# Patient Record
Sex: MALE | Race: WHITE | Employment: FULL TIME | URBAN - METROPOLITAN AREA
[De-identification: names, ages, dates, MRNs, and addresses within clinical notes are randomized per-mention and may not be internally consistent; named-entity substitution may affect disease eponyms.]

---

## 2017-02-28 ENCOUNTER — APPOINTMENT (OUTPATIENT)
Dept: GENERAL RADIOLOGY | Age: 47
End: 2017-02-28
Attending: EMERGENCY MEDICINE
Payer: COMMERCIAL

## 2017-02-28 ENCOUNTER — HOSPITAL ENCOUNTER (EMERGENCY)
Age: 47
Discharge: HOME | End: 2017-02-28
Attending: EMERGENCY MEDICINE
Payer: COMMERCIAL

## 2017-02-28 VITALS
HEART RATE: 86 BPM | HEIGHT: 78 IN | RESPIRATION RATE: 16 BRPM | BODY MASS INDEX: 36.45 KG/M2 | DIASTOLIC BLOOD PRESSURE: 74 MMHG | TEMPERATURE: 98.2 F | SYSTOLIC BLOOD PRESSURE: 128 MMHG | OXYGEN SATURATION: 98 % | WEIGHT: 315 LBS

## 2017-02-28 DIAGNOSIS — R91.8 LEFT PULMONARY INFILTRATE ON CXR: ICD-10-CM

## 2017-02-28 DIAGNOSIS — R07.89 ATYPICAL CHEST PAIN: Primary | ICD-10-CM

## 2017-02-28 LAB
ALBUMIN SERPL BCP-MCNC: 3.7 G/DL (ref 3.5–5)
ALBUMIN/GLOB SERPL: 1 {RATIO} (ref 1.2–3.5)
ALP SERPL-CCNC: 86 U/L (ref 50–136)
ALT SERPL-CCNC: 40 U/L (ref 12–65)
ANION GAP BLD CALC-SCNC: 7 MMOL/L (ref 7–16)
AST SERPL W P-5'-P-CCNC: 27 U/L (ref 15–37)
ATRIAL RATE: 187 BPM
BASOPHILS # BLD AUTO: 0 K/UL (ref 0–0.2)
BASOPHILS # BLD: 0 % (ref 0–2)
BILIRUB SERPL-MCNC: 0.2 MG/DL (ref 0.2–1.1)
BUN SERPL-MCNC: 13 MG/DL (ref 6–23)
CALCIUM SERPL-MCNC: 8.7 MG/DL (ref 8.3–10.4)
CALCULATED P AXIS, ECG09: NORMAL DEGREES
CALCULATED R AXIS, ECG10: 26 DEGREES
CALCULATED T AXIS, ECG11: 53 DEGREES
CHLORIDE SERPL-SCNC: 106 MMOL/L (ref 98–107)
CO2 SERPL-SCNC: 28 MMOL/L (ref 21–32)
CREAT SERPL-MCNC: 0.93 MG/DL (ref 0.8–1.5)
DIAGNOSIS, 93000: NORMAL
DIASTOLIC BP, ECG02: NORMAL MMHG
DIFFERENTIAL METHOD BLD: ABNORMAL
EOSINOPHIL # BLD: 0.2 K/UL (ref 0–0.8)
EOSINOPHIL NFR BLD: 1 % (ref 0.5–7.8)
ERYTHROCYTE [DISTWIDTH] IN BLOOD BY AUTOMATED COUNT: 12.7 % (ref 11.9–14.6)
GLOBULIN SER CALC-MCNC: 3.6 G/DL (ref 2.3–3.5)
GLUCOSE SERPL-MCNC: 156 MG/DL (ref 65–100)
HCT VFR BLD AUTO: 44.5 % (ref 41.1–50.3)
HGB BLD-MCNC: 15.1 G/DL (ref 13.6–17.2)
IMM GRANULOCYTES # BLD: 0.1 K/UL (ref 0–0.5)
IMM GRANULOCYTES NFR BLD AUTO: 0.4 % (ref 0–5)
LYMPHOCYTES # BLD AUTO: 25 % (ref 13–44)
LYMPHOCYTES # BLD: 3.5 K/UL (ref 0.5–4.6)
MCH RBC QN AUTO: 29.6 PG (ref 26.1–32.9)
MCHC RBC AUTO-ENTMCNC: 33.9 G/DL (ref 31.4–35)
MCV RBC AUTO: 87.3 FL (ref 79.6–97.8)
MONOCYTES # BLD: 0.8 K/UL (ref 0.1–1.3)
MONOCYTES NFR BLD AUTO: 6 % (ref 4–12)
NEUTS SEG # BLD: 9.2 K/UL (ref 1.7–8.2)
NEUTS SEG NFR BLD AUTO: 68 % (ref 43–78)
P-R INTERVAL, ECG05: NORMAL MS
PLATELET # BLD AUTO: 271 K/UL (ref 150–450)
PMV BLD AUTO: 11.3 FL (ref 10.8–14.1)
POTASSIUM SERPL-SCNC: 4.2 MMOL/L (ref 3.5–5.1)
PROT SERPL-MCNC: 7.3 G/DL (ref 6.3–8.2)
Q-T INTERVAL, ECG07: 340 MS
QRS DURATION, ECG06: 96 MS
QTC CALCULATION (BEZET), ECG08: 418 MS
RBC # BLD AUTO: 5.1 M/UL (ref 4.23–5.67)
SODIUM SERPL-SCNC: 141 MMOL/L (ref 136–145)
SYSTOLIC BP, ECG01: NORMAL MMHG
TROPONIN I BLD-MCNC: 0 NG/ML (ref 0–0.08)
VENTRICULAR RATE, ECG03: 91 BPM
WBC # BLD AUTO: 13.8 K/UL (ref 4.3–11.1)

## 2017-02-28 PROCEDURE — 99284 EMERGENCY DEPT VISIT MOD MDM: CPT | Performed by: EMERGENCY MEDICINE

## 2017-02-28 PROCEDURE — 80053 COMPREHEN METABOLIC PANEL: CPT | Performed by: EMERGENCY MEDICINE

## 2017-02-28 PROCEDURE — 96374 THER/PROPH/DIAG INJ IV PUSH: CPT | Performed by: EMERGENCY MEDICINE

## 2017-02-28 PROCEDURE — 71010 XR CHEST PORT: CPT

## 2017-02-28 PROCEDURE — 85025 COMPLETE CBC W/AUTO DIFF WBC: CPT | Performed by: EMERGENCY MEDICINE

## 2017-02-28 PROCEDURE — 74011250637 HC RX REV CODE- 250/637: Performed by: EMERGENCY MEDICINE

## 2017-02-28 PROCEDURE — 93005 ELECTROCARDIOGRAM TRACING: CPT | Performed by: EMERGENCY MEDICINE

## 2017-02-28 PROCEDURE — 74011250636 HC RX REV CODE- 250/636: Performed by: EMERGENCY MEDICINE

## 2017-02-28 PROCEDURE — 84484 ASSAY OF TROPONIN QUANT: CPT

## 2017-02-28 RX ORDER — SUCRALFATE 1 G/10ML
1 SUSPENSION ORAL
Status: COMPLETED | OUTPATIENT
Start: 2017-02-28 | End: 2017-02-28

## 2017-02-28 RX ORDER — KETOROLAC TROMETHAMINE 30 MG/ML
30 INJECTION, SOLUTION INTRAMUSCULAR; INTRAVENOUS
Status: COMPLETED | OUTPATIENT
Start: 2017-02-28 | End: 2017-02-28

## 2017-02-28 RX ORDER — DOXYCYCLINE 100 MG/1
100 CAPSULE ORAL 2 TIMES DAILY
Qty: 14 CAP | Refills: 0 | Status: SHIPPED | OUTPATIENT
Start: 2017-02-28 | End: 2017-03-07

## 2017-02-28 RX ORDER — NAPROXEN 500 MG/1
500 TABLET ORAL 2 TIMES DAILY WITH MEALS
Qty: 20 TAB | Refills: 0 | Status: SHIPPED | OUTPATIENT
Start: 2017-02-28 | End: 2017-03-10

## 2017-02-28 RX ADMIN — SUCRALFATE 1 G: 1 SUSPENSION ORAL at 10:09

## 2017-02-28 RX ADMIN — KETOROLAC TROMETHAMINE 30 MG: 30 INJECTION, SOLUTION INTRAMUSCULAR at 12:35

## 2017-02-28 NOTE — ED PROVIDER NOTES
HPI Comments: Patient comes in the department with chest pain goes into his left shoulder and left latissimus region. He does have some radiation down to the top of his left hand as well. The smoker with a family history of father requiring stents. He has no other known family history. Yesterday he states that he had \"bad heartburn\" he stated it was difficult to get this to resolve. Did not have any associated shortness of breath today or yesterday. He had some slight nausea this morning. Does not relate an awareness of exertional worsening. Given aspirin and nitroglycerin in transit states of the nitroglycerin gave him a headache but did not seem to impact his chest discomfort. Not changed with physical exertion and in ER palpation to same exact area reproduces his pain. No injury /redness or rash. No fever/cough or sputum. Patient is a 55 y.o. male presenting with chest pain. The history is provided by the patient. Chest Pain (Angina)    This is a new problem. The current episode started 12 to 24 hours ago. The problem has not changed since onset. Pertinent negatives include no cough, no fever, no palpitations and no shortness of breath. Past Medical History:   Diagnosis Date    Arthritis     Cholesterol serum elevated     Diabetes ([facility] [address] 1/17/2012    pt denies dx of diabetes; blood glucose=210, urine with glucose >1000    History of kidney stones 2009    Hypertension        Past Surgical History:   Procedure Laterality Date    HX APPENDECTOMY  2007    HX KNEE ARTHROSCOPY      right, x 11    HX KNEE REPLACEMENT  2009    right    HX VASECTOMY           History reviewed. No pertinent family history. Social History     Social History    Marital status:      Spouse name: N/A    Number of children: N/A    Years of education: N/A     Occupational History    Not on file.      Social History Main Topics    Smoking status: Current Every Day Smoker     Packs/day: 1.00     Years: 20.00    Smokeless tobacco: Never Used    Alcohol use No    Drug use: No    Sexual activity: Not on file     Other Topics Concern    Not on file     Social History Narrative         ALLERGIES: Review of patient's allergies indicates no known allergies. Review of Systems   Constitutional: Negative for chills, fatigue and fever. Respiratory: Negative for cough and shortness of breath. Cardiovascular: Positive for chest pain. Negative for palpitations and leg swelling. Gastrointestinal: Negative. Musculoskeletal: Negative. All other systems reviewed and are negative. Vitals:    02/28/17 0939   BP: 164/89   Pulse: 98   Resp: 18   Temp: 98.2 °F (36.8 °C)   SpO2: 98%   Weight: (!) 169.6 kg (374 lb)   Height: 6' 8\" (2.032 m)            Physical Exam   Constitutional: He appears well-developed and well-nourished. No distress. HENT:   Head: Atraumatic. Eyes: No scleral icterus. Neck: Neck supple. No JVD present. Cardiovascular: Normal rate, regular rhythm, normal heart sounds and intact distal pulses. No murmur heard. Pulmonary/Chest: Effort normal. No respiratory distress. He exhibits tenderness. He exhibits no bony tenderness, no crepitus, no edema, no deformity and no swelling. Abdominal: Soft. He exhibits no distension. There is no rebound and no guarding. Musculoskeletal: Normal range of motion. He exhibits no edema or tenderness. Neurological: He is alert. Skin: Skin is warm and dry. No rash noted. No erythema. Psychiatric: His behavior is normal. Thought content normal.   Nursing note and vitals reviewed. MDM  Number of Diagnoses or Management Options  Atypical chest pain:   Left pulmonary infiltrate on CXR:   Diagnosis management comments: Very reproducible pain that he is quite specific reproduces his presenting pain to palpation of region. No infectious changes. Will check cardiac. No change with exertion and nitroglycerin did not impact.  Does have risk factors and plans on following up with PMD in AM. Discussed at length need to return with any worsening and while present studies show no evidence of infarct they do NOT rule out coronary disease - patient and wife acknowledge understanding       Amount and/or Complexity of Data Reviewed  Clinical lab tests: ordered and reviewed  Tests in the radiology section of CPT®: reviewed and ordered  Decide to obtain previous medical records or to obtain history from someone other than the patient: yes  Obtain history from someone other than the patient: yes  Independent visualization of images, tracings, or specimens: yes    Risk of Complications, Morbidity, and/or Mortality  Presenting problems: high  Diagnostic procedures: low  Management options: moderate  General comments: Aware to return with ANY worsening    Patient Progress  Patient progress: stable    ED Course       Procedures     PORTABLE CHEST, February 28, 2017 at 1020 hours     CLINICAL HISTORY: CHEST pain with leukocytosis.     COMPARISON: None.     FINDINGS: AP erect image demonstrates no confluent infiltrate or significant  pleural fluid. There is patchy left perihilar infiltrate. The heart size is  within normal limits without evidence of congestive heart failure or  pneumothorax. The bony thorax appears intact on this view.  There are overlying  radiopaque support devices.     IMPRESSION  IMPRESSION: PATCHY LEFT PERIHILAR INFILTRATE IS NOT SPECIFIC BUT SUSPICIOUS FOR  PNEUMONIA IN THIS CLINICAL SETTING.       Imaging      Recent Results (from the past 12 hour(s))   CBC WITH AUTOMATED DIFF    Collection Time: 02/28/17  9:40 AM   Result Value Ref Range    WBC 13.8 (H) 4.3 - 11.1 K/uL    RBC 5.10 4.23 - 5.67 M/uL    HGB 15.1 13.6 - 17.2 g/dL    HCT 44.5 41.1 - 50.3 %    MCV 87.3 79.6 - 97.8 FL    MCH 29.6 26.1 - 32.9 PG    MCHC 33.9 31.4 - 35.0 g/dL    RDW 12.7 11.9 - 14.6 %    PLATELET 632 271 - 199 K/uL    MPV 11.3 10.8 - 14.1 FL    DF AUTOMATED NEUTROPHILS 68 43 - 78 %    LYMPHOCYTES 25 13 - 44 %    MONOCYTES 6 4.0 - 12.0 %    EOSINOPHILS 1 0.5 - 7.8 %    BASOPHILS 0 0.0 - 2.0 %    IMMATURE GRANULOCYTES 0.4 0.0 - 5.0 %    ABS. NEUTROPHILS 9.2 (H) 1.7 - 8.2 K/UL    ABS. LYMPHOCYTES 3.5 0.5 - 4.6 K/UL    ABS. MONOCYTES 0.8 0.1 - 1.3 K/UL    ABS. EOSINOPHILS 0.2 0.0 - 0.8 K/UL    ABS. BASOPHILS 0.0 0.0 - 0.2 K/UL    ABS. IMM. GRANS. 0.1 0.0 - 0.5 K/UL   METABOLIC PANEL, COMPREHENSIVE    Collection Time: 02/28/17  9:40 AM   Result Value Ref Range    Sodium 141 136 - 145 mmol/L    Potassium 4.2 3.5 - 5.1 mmol/L    Chloride 106 98 - 107 mmol/L    CO2 28 21 - 32 mmol/L    Anion gap 7 7 - 16 mmol/L    Glucose 156 (H) 65 - 100 mg/dL    BUN 13 6 - 23 MG/DL    Creatinine 0.93 0.8 - 1.5 MG/DL    GFR est AA >60 >60 ml/min/1.73m2    GFR est non-AA >60 >60 ml/min/1.73m2    Calcium 8.7 8.3 - 10.4 MG/DL    Bilirubin, total 0.2 0.2 - 1.1 MG/DL    ALT (SGPT) 40 12 - 65 U/L    AST (SGOT) 27 15 - 37 U/L    Alk. phosphatase 86 50 - 136 U/L    Protein, total 7.3 6.3 - 8.2 g/dL    Albumin 3.7 3.5 - 5.0 g/dL    Globulin 3.6 (H) 2.3 - 3.5 g/dL    A-G Ratio 1.0 (L) 1.2 - 3.5     EKG, 12 LEAD, INITIAL    Collection Time: 02/28/17  9:40 AM   Result Value Ref Range    Systolic BP  mmHg    Diastolic BP  mmHg    Ventricular Rate 91 BPM    Atrial Rate 187 BPM    P-R Interval  ms    QRS Duration 96 ms    Q-T Interval 340 ms    QTC Calculation (Bezet) 418 ms    Calculated P Axis  degrees    Calculated R Axis 26 degrees    Calculated T Axis 53 degrees    Diagnosis       !! AGE AND GENDER SPECIFIC ECG ANALYSIS !!   Normal sinus rhythm  Abnormal ECG  When compared with ECG of 09-FEB-2012 09:16,  Nonspecific T wave abnormality, improved in Lateral leads  Confirmed by PATRICE MACIAS (), ISAAC CLAY ([id number]) on 2/28/2017 12:30:26 PM     POC TROPONIN-I    Collection Time: 02/28/17 12:44 PM   Result Value Ref Range    Troponin-I (POC) 0 0.0 - 0.08 ng/ml

## 2017-02-28 NOTE — LETTER
[facility] [place] [address] [id number] [phone] Work/School Note Date: 2/28/2017 To Whom It May concern: 
 
Carmen Yeung was seen and treated today in the emergency room by the following provider(s): 
Attending Provider: Soy Thompson MD.   
 
Carmen Yeung may return to work on 3/2/17. Sincerely, Steve Greco

## 2017-02-28 NOTE — ED TRIAGE NOTES
Pt arrives per EMS with complaints of CP beginning this AM. Had \"real bad heart burn\" when he went to bed last night. Given 324mg ASA and 2 Nitro per EMS with no relief of pain.

## 2017-02-28 NOTE — LETTER
[facility] EMERGENCY DEPT One [address] [address] [phone] Work/School Note Date: 2/28/2017 To Whom It May concern: 
 
Mark Vaca was seen and treated today in the emergency room by the following provider(s): 
Attending Provider: Amie Young MD.   
 
Mark Vaca may return to work on 3/2. Sincerely, 
 
 
 
 
Amie Young MD

## 2017-02-28 NOTE — DISCHARGE INSTRUCTIONS
Chest Pain: Care Instructions  Your Care Instructions  There are many things that can cause chest pain. Some are not serious and will get better on their own in a few days. But some kinds of chest pain need more testing and treatment. Your doctor may have recommended a follow-up visit in the next 8 to 12 hours. If you are not getting better, you may need more tests or treatment. Even though your doctor has released you, you still need to watch for any problems. The doctor carefully checked you, but sometimes problems can develop later. If you have new symptoms or if your symptoms do not get better, get medical care right away. If you have worse or different chest pain or pressure that lasts more than 5 minutes or you passed out (lost consciousness), call 911 or seek other emergency help right away. A medical visit is only one step in your treatment. Even if you feel better, you still need to do what your doctor recommends, such as going to all suggested follow-up appointments and taking medicines exactly as directed. This will help you recover and help prevent future problems. How can you care for yourself at home? · Rest until you feel better. · Take your medicine exactly as prescribed. Call your doctor if you think you are having a problem with your medicine. · Do not drive after taking a prescription pain medicine. When should you call for help? Call 911 if:  · You passed out (lost consciousness). · You have severe difficulty breathing. · You have symptoms of a heart attack. These may include:  ¨ Chest pain or pressure, or a strange feeling in your chest.  ¨ Sweating. ¨ Shortness of breath. ¨ Nausea or vomiting. ¨ Pain, pressure, or a strange feeling in your back, neck, jaw, or upper belly or in one or both shoulders or arms. ¨ Lightheadedness or sudden weakness. ¨ A fast or irregular heartbeat.   After you call 911, the  may tell you to chew 1 adult-strength or 2 to 4 low-dose aspirin. Wait for an ambulance. Do not try to drive yourself. Call your doctor today if:  · You have any trouble breathing. · Your chest pain gets worse. · You are dizzy or lightheaded, or you feel like you may faint. · You are not getting better as expected. · You are having new or different chest pain. Where can you learn more? Go to http://alfredo-evan.info/. Enter A120 in the search box to learn more about \"Chest Pain: Care Instructions. \"  Current as of: May 27, 2016  Content Version: 11.1  © 8372-7255 [employer]. Care instructions adapted under license by [employer] (which disclaims liability or warranty for this information). If you have questions about a medical condition or this instruction, always ask your healthcare professional. [address] any warranty or liability for your use of this information. Musculoskeletal Chest Pain: Care Instructions  Your Care Instructions  Chest pain is not always a sign that something is wrong with your heart or that you have another serious problem. The doctor thinks your chest pain is caused by strained muscles or ligaments, inflamed chest cartilage, or another problem in your chest, rather than by your heart. You may need more tests to find the cause of your chest pain. Follow-up care is a key part of your treatment and safety. Be sure to make and go to all appointments, and call your doctor if you are having problems. Its also a good idea to know your test results and keep a list of the medicines you take. How can you care for yourself at home? · Take pain medicines exactly as directed. ¨ If the doctor gave you a prescription medicine for pain, take it as prescribed. ¨ If you are not taking a prescription pain medicine, ask your doctor if you can take an over-the-counter medicine. · Rest and protect the sore area.   · Stop, change, or take a break from any activity that may be causing your pain or soreness. · Put ice or a cold pack on the sore area for 10 to 20 minutes at a time. Try to do this every 1 to 2 hours for the next 3 days (when you are awake) or until the swelling goes down. Put a thin cloth between the ice and your skin. · After 2 or 3 days, apply a heating pad set on low or a warm cloth to the area that hurts. Some doctors suggest that you go back and forth between hot and cold. · Do not wrap or tape your ribs for support. This may cause you to take smaller breaths, which could increase your risk of lung problems. · Mentholated creams such as Bengay or Icy Hot may soothe sore muscles. Follow the instructions on the package. · Follow your doctor's instructions for exercising. · Gentle stretching and massage may help you get better faster. Stretch slowly to the point just before pain begins, and hold the stretch for at least 15 to 30 seconds. Do this 3 or 4 times a day. Stretch just after you have applied heat. · As your pain gets better, slowly return to your normal activities. Any increased pain may be a sign that you need to rest a while longer. When should you call for help? Call 911 anytime you think you may need emergency care. For example, call if:  · You have chest pain or pressure. This may occur with:  ¨ Sweating. ¨ Shortness of breath. ¨ Nausea or vomiting. ¨ Pain that spreads from the chest to the neck, jaw, or one or both shoulders or arms. ¨ Dizziness or lightheadedness. ¨ A fast or uneven pulse. After calling 911, chew 1 adult-strength aspirin. Wait for an ambulance. Do not try to drive yourself. · You have sudden chest pain and shortness of breath, or you cough up blood. Call your doctor now or seek immediate medical care if:  · You have any trouble breathing. · Your chest pain gets worse.   · Your chest pain occurs consistently with exercise and is relieved by rest.  Watch closely for changes in your health, and be sure to contact your doctor if:  · Your chest pain does not get better after 1 week. Where can you learn more? Go to http://alfredo-evan.info/. Enter V293 in the search box to learn more about \"Musculoskeletal Chest Pain: Care Instructions. \"  Current as of: May 27, 2016  Content Version: 11.1  © 5481-9418 [employer]. Care instructions adapted under license by [employer] (which disclaims liability or warranty for this information). If you have questions about a medical condition or this instruction, always ask your healthcare professional. [address] any warranty or liability for your use of this information.

## 2017-03-01 LAB — TROPONIN I BLD-MCNC: 0 NG/ML (ref 0–0.08)

## 2024-04-25 RX ORDER — ASPIRIN 81 MG/1
81 TABLET, CHEWABLE ORAL
COMMUNITY

## 2024-04-25 RX ORDER — NAPROXEN 500 MG/1
500 TABLET ORAL 2 TIMES DAILY WITH MEALS
COMMUNITY

## 2024-04-25 RX ORDER — TIRZEPATIDE 2.5 MG/.5ML
2.5 INJECTION, SOLUTION SUBCUTANEOUS WEEKLY
COMMUNITY

## 2024-04-25 RX ORDER — ROSUVASTATIN CALCIUM 40 MG/1
40 TABLET, COATED ORAL EVERY EVENING
COMMUNITY

## 2024-04-25 RX ORDER — OMEPRAZOLE 20 MG/1
40 CAPSULE, DELAYED RELEASE ORAL
COMMUNITY

## 2024-04-25 RX ORDER — CETIRIZINE HYDROCHLORIDE 10 MG/1
10 TABLET ORAL NIGHTLY
COMMUNITY

## 2024-04-25 RX ORDER — PREGABALIN 300 MG/1
300 CAPSULE ORAL 2 TIMES DAILY
COMMUNITY

## 2024-04-25 RX ORDER — LISINOPRIL 40 MG/1
40 TABLET ORAL
COMMUNITY

## 2024-04-25 RX ORDER — GLIPIZIDE 10 MG/1
10 TABLET ORAL
COMMUNITY

## 2024-04-25 RX ORDER — AMLODIPINE BESYLATE 10 MG/1
10 TABLET ORAL
COMMUNITY

## 2024-04-25 RX ORDER — MONTELUKAST SODIUM 10 MG/1
10 TABLET ORAL NIGHTLY
COMMUNITY

## 2024-04-25 RX ORDER — METOPROLOL SUCCINATE 50 MG/1
50 TABLET, EXTENDED RELEASE ORAL
COMMUNITY

## 2024-04-29 ENCOUNTER — ANESTHESIA EVENT (OUTPATIENT)
Dept: SURGERY | Age: 54
End: 2024-04-29
Payer: COMMERCIAL

## 2024-04-29 NOTE — PERIOP NOTE
Preop department called to notify patient of arrival time for scheduled procedure. Instructions given to   - Arrive at [facility] Entrance [address].  - Remain NPO after midnight, unless otherwise indicated, including gum, mints, and ice chips.   - Have a responsible adult to drive patient to the hospital, stay during surgery, and patient will need supervision 24 hours after anesthesia.   - Use antibacterial soap in shower the night before surgery and on the morning of surgery.       Was patient contacted: yes, spoke to patient  Voicemail left:   Numbers contacted: [phone]   Arrival time: 0530      
your surgery is at the [facility].  > Do not wear make-up, nail polish, lotions, cologne, perfumes, powders, or oil on skin. Artificial nails are not permitted.

## 2024-04-30 ENCOUNTER — HOSPITAL ENCOUNTER (OUTPATIENT)
Age: 54
Setting detail: OUTPATIENT SURGERY
Discharge: HOME | End: 2024-04-30
Attending: PODIATRIST | Admitting: PODIATRIST
Payer: COMMERCIAL

## 2024-04-30 ENCOUNTER — APPOINTMENT (OUTPATIENT)
Dept: GENERAL RADIOLOGY | Age: 54
End: 2024-04-30
Attending: PODIATRIST
Payer: COMMERCIAL

## 2024-04-30 ENCOUNTER — ANESTHESIA (OUTPATIENT)
Dept: SURGERY | Age: 54
End: 2024-04-30
Payer: COMMERCIAL

## 2024-04-30 VITALS
HEART RATE: 62 BPM | WEIGHT: 315 LBS | HEIGHT: 78 IN | RESPIRATION RATE: 20 BRPM | SYSTOLIC BLOOD PRESSURE: 143 MMHG | BODY MASS INDEX: 36.45 KG/M2 | TEMPERATURE: 97.5 F | OXYGEN SATURATION: 94 % | DIASTOLIC BLOOD PRESSURE: 75 MMHG

## 2024-04-30 LAB
GLUCOSE BLD STRIP.AUTO-MCNC: 181 MG/DL (ref 65–100)
SERVICE CMNT-IMP: ABNORMAL

## 2024-04-30 PROCEDURE — 6360000002 HC RX W HCPCS: Performed by: NURSE ANESTHETIST, CERTIFIED REGISTERED

## 2024-04-30 PROCEDURE — 7100000010 HC PHASE II RECOVERY - FIRST 15 MIN: Performed by: PODIATRIST

## 2024-04-30 PROCEDURE — 3700000000 HC ANESTHESIA ATTENDED CARE: Performed by: PODIATRIST

## 2024-04-30 PROCEDURE — 2709999900 HC NON-CHARGEABLE SUPPLY: Performed by: PODIATRIST

## 2024-04-30 PROCEDURE — 2500000003 HC RX 250 WO HCPCS: Performed by: PODIATRIST

## 2024-04-30 PROCEDURE — 3700000001 HC ADD 15 MINUTES (ANESTHESIA): Performed by: PODIATRIST

## 2024-04-30 PROCEDURE — 6360000002 HC RX W HCPCS: Performed by: PODIATRIST

## 2024-04-30 PROCEDURE — 2580000003 HC RX 258: Performed by: ANESTHESIOLOGY

## 2024-04-30 PROCEDURE — 7100000001 HC PACU RECOVERY - ADDTL 15 MIN: Performed by: PODIATRIST

## 2024-04-30 PROCEDURE — 3600000004 HC SURGERY LEVEL 4 BASE: Performed by: PODIATRIST

## 2024-04-30 PROCEDURE — 82962 GLUCOSE BLOOD TEST: CPT

## 2024-04-30 PROCEDURE — 7100000000 HC PACU RECOVERY - FIRST 15 MIN: Performed by: PODIATRIST

## 2024-04-30 PROCEDURE — 88311 DECALCIFY TISSUE: CPT

## 2024-04-30 PROCEDURE — 3600000014 HC SURGERY LEVEL 4 ADDTL 15MIN: Performed by: PODIATRIST

## 2024-04-30 PROCEDURE — 87176 TISSUE HOMOGENIZATION CULTR: CPT

## 2024-04-30 PROCEDURE — 2500000003 HC RX 250 WO HCPCS: Performed by: NURSE ANESTHETIST, CERTIFIED REGISTERED

## 2024-04-30 PROCEDURE — 87070 CULTURE OTHR SPECIMN AEROBIC: CPT

## 2024-04-30 PROCEDURE — 87075 CULTR BACTERIA EXCEPT BLOOD: CPT

## 2024-04-30 PROCEDURE — 6370000000 HC RX 637 (ALT 250 FOR IP): Performed by: ANESTHESIOLOGY

## 2024-04-30 PROCEDURE — 87205 SMEAR GRAM STAIN: CPT

## 2024-04-30 PROCEDURE — 88304 TISSUE EXAM BY PATHOLOGIST: CPT

## 2024-04-30 RX ORDER — SODIUM CHLORIDE 0.9 % (FLUSH) 0.9 %
5-40 SYRINGE (ML) INJECTION PRN
Status: DISCONTINUED | OUTPATIENT
Start: 2024-04-30 | End: 2024-04-30 | Stop reason: HOSPADM

## 2024-04-30 RX ORDER — MIDAZOLAM HYDROCHLORIDE 2 MG/2ML
2 INJECTION, SOLUTION INTRAMUSCULAR; INTRAVENOUS
Status: DISCONTINUED | OUTPATIENT
Start: 2024-04-30 | End: 2024-04-30 | Stop reason: HOSPADM

## 2024-04-30 RX ORDER — HYDROMORPHONE HYDROCHLORIDE 2 MG/ML
0.5 INJECTION, SOLUTION INTRAMUSCULAR; INTRAVENOUS; SUBCUTANEOUS EVERY 5 MIN PRN
Status: DISCONTINUED | OUTPATIENT
Start: 2024-04-30 | End: 2024-04-30 | Stop reason: HOSPADM

## 2024-04-30 RX ORDER — SODIUM CHLORIDE 9 MG/ML
INJECTION, SOLUTION INTRAVENOUS PRN
Status: DISCONTINUED | OUTPATIENT
Start: 2024-04-30 | End: 2024-04-30 | Stop reason: HOSPADM

## 2024-04-30 RX ORDER — SODIUM CHLORIDE, SODIUM LACTATE, POTASSIUM CHLORIDE, CALCIUM CHLORIDE 600; 310; 30; 20 MG/100ML; MG/100ML; MG/100ML; MG/100ML
INJECTION, SOLUTION INTRAVENOUS CONTINUOUS
Status: DISCONTINUED | OUTPATIENT
Start: 2024-04-30 | End: 2024-04-30 | Stop reason: HOSPADM

## 2024-04-30 RX ORDER — SODIUM CHLORIDE 0.9 % (FLUSH) 0.9 %
5-40 SYRINGE (ML) INJECTION EVERY 12 HOURS SCHEDULED
Status: DISCONTINUED | OUTPATIENT
Start: 2024-04-30 | End: 2024-04-30 | Stop reason: HOSPADM

## 2024-04-30 RX ORDER — PROCHLORPERAZINE EDISYLATE 5 MG/ML
5 INJECTION INTRAMUSCULAR; INTRAVENOUS
Status: DISCONTINUED | OUTPATIENT
Start: 2024-04-30 | End: 2024-04-30 | Stop reason: HOSPADM

## 2024-04-30 RX ORDER — BUPIVACAINE HYDROCHLORIDE 5 MG/ML
INJECTION, SOLUTION EPIDURAL; INTRACAUDAL PRN
Status: DISCONTINUED | OUTPATIENT
Start: 2024-04-30 | End: 2024-04-30 | Stop reason: ALTCHOICE

## 2024-04-30 RX ORDER — LIDOCAINE HYDROCHLORIDE 20 MG/ML
INJECTION, SOLUTION EPIDURAL; INFILTRATION; INTRACAUDAL; PERINEURAL PRN
Status: DISCONTINUED | OUTPATIENT
Start: 2024-04-30 | End: 2024-04-30 | Stop reason: SDUPTHER

## 2024-04-30 RX ORDER — LIDOCAINE HYDROCHLORIDE 10 MG/ML
INJECTION, SOLUTION INFILTRATION; PERINEURAL PRN
Status: DISCONTINUED | OUTPATIENT
Start: 2024-04-30 | End: 2024-04-30 | Stop reason: ALTCHOICE

## 2024-04-30 RX ORDER — MIDAZOLAM HYDROCHLORIDE 1 MG/ML
INJECTION INTRAMUSCULAR; INTRAVENOUS PRN
Status: DISCONTINUED | OUTPATIENT
Start: 2024-04-30 | End: 2024-04-30 | Stop reason: SDUPTHER

## 2024-04-30 RX ORDER — LIDOCAINE HYDROCHLORIDE 10 MG/ML
1 INJECTION, SOLUTION INFILTRATION; PERINEURAL
Status: DISCONTINUED | OUTPATIENT
Start: 2024-04-30 | End: 2024-04-30 | Stop reason: HOSPADM

## 2024-04-30 RX ORDER — DIPHENHYDRAMINE HYDROCHLORIDE 50 MG/ML
12.5 INJECTION INTRAMUSCULAR; INTRAVENOUS
Status: DISCONTINUED | OUTPATIENT
Start: 2024-04-30 | End: 2024-04-30 | Stop reason: HOSPADM

## 2024-04-30 RX ORDER — FENTANYL CITRATE 50 UG/ML
INJECTION, SOLUTION INTRAMUSCULAR; INTRAVENOUS PRN
Status: DISCONTINUED | OUTPATIENT
Start: 2024-04-30 | End: 2024-04-30 | Stop reason: SDUPTHER

## 2024-04-30 RX ORDER — ONDANSETRON 2 MG/ML
4 INJECTION INTRAMUSCULAR; INTRAVENOUS
Status: DISCONTINUED | OUTPATIENT
Start: 2024-04-30 | End: 2024-04-30 | Stop reason: HOSPADM

## 2024-04-30 RX ORDER — OXYCODONE HYDROCHLORIDE 5 MG/1
5 TABLET ORAL
Status: DISCONTINUED | OUTPATIENT
Start: 2024-04-30 | End: 2024-04-30 | Stop reason: HOSPADM

## 2024-04-30 RX ORDER — ACETAMINOPHEN 500 MG
1000 TABLET ORAL ONCE
Status: COMPLETED | OUTPATIENT
Start: 2024-04-30 | End: 2024-04-30

## 2024-04-30 RX ORDER — DEXTROSE MONOHYDRATE 100 MG/ML
INJECTION, SOLUTION INTRAVENOUS CONTINUOUS PRN
Status: DISCONTINUED | OUTPATIENT
Start: 2024-04-30 | End: 2024-04-30 | Stop reason: HOSPADM

## 2024-04-30 RX ORDER — CEFAZOLIN SODIUM 1 G/3ML
INJECTION, POWDER, FOR SOLUTION INTRAMUSCULAR; INTRAVENOUS PRN
Status: DISCONTINUED | OUTPATIENT
Start: 2024-04-30 | End: 2024-04-30 | Stop reason: ALTCHOICE

## 2024-04-30 RX ORDER — PROPOFOL 10 MG/ML
INJECTION, EMULSION INTRAVENOUS PRN
Status: DISCONTINUED | OUTPATIENT
Start: 2024-04-30 | End: 2024-04-30 | Stop reason: SDUPTHER

## 2024-04-30 RX ORDER — IBUPROFEN 600 MG/1
1 TABLET ORAL PRN
Status: DISCONTINUED | OUTPATIENT
Start: 2024-04-30 | End: 2024-04-30 | Stop reason: HOSPADM

## 2024-04-30 RX ORDER — NALOXONE HYDROCHLORIDE 0.4 MG/ML
INJECTION, SOLUTION INTRAMUSCULAR; INTRAVENOUS; SUBCUTANEOUS PRN
Status: DISCONTINUED | OUTPATIENT
Start: 2024-04-30 | End: 2024-04-30 | Stop reason: HOSPADM

## 2024-04-30 RX ADMIN — PROPOFOL 50 MG: 10 INJECTION, EMULSION INTRAVENOUS at 07:27

## 2024-04-30 RX ADMIN — SODIUM CHLORIDE, POTASSIUM CHLORIDE, SODIUM LACTATE AND CALCIUM CHLORIDE: 600; 310; 30; 20 INJECTION, SOLUTION INTRAVENOUS at 06:54

## 2024-04-30 RX ADMIN — FENTANYL CITRATE 50 MCG: 50 INJECTION, SOLUTION INTRAMUSCULAR; INTRAVENOUS at 08:18

## 2024-04-30 RX ADMIN — Medication 3000 MG: at 07:28

## 2024-04-30 RX ADMIN — ACETAMINOPHEN 1000 MG: 500 TABLET, FILM COATED ORAL at 06:53

## 2024-04-30 RX ADMIN — LIDOCAINE HYDROCHLORIDE 40 MG: 20 INJECTION, SOLUTION EPIDURAL; INFILTRATION; INTRACAUDAL; PERINEURAL at 07:27

## 2024-04-30 RX ADMIN — PROPOFOL 140 MCG/KG/MIN: 10 INJECTION, EMULSION INTRAVENOUS at 07:28

## 2024-04-30 RX ADMIN — FENTANYL CITRATE 50 MCG: 50 INJECTION, SOLUTION INTRAMUSCULAR; INTRAVENOUS at 07:25

## 2024-04-30 RX ADMIN — MIDAZOLAM 2 MG: 1 INJECTION INTRAMUSCULAR; INTRAVENOUS at 07:25

## 2024-04-30 ASSESSMENT — PAIN SCALES - GENERAL
PAINLEVEL_OUTOF10: 0

## 2024-04-30 ASSESSMENT — PAIN - FUNCTIONAL ASSESSMENT: PAIN_FUNCTIONAL_ASSESSMENT: 0-10

## 2024-04-30 ASSESSMENT — LIFESTYLE VARIABLES: SMOKING_STATUS: 1

## 2024-04-30 ASSESSMENT — PAIN DESCRIPTION - DESCRIPTORS: DESCRIPTORS: ACHING

## 2024-04-30 NOTE — DISCHARGE INSTRUCTIONS
Post Op Podiatric Surgery Orders    1. Elevate foot / ankle.  2. Ice to foot / ankle.  3. Post op shoe walk as tolerated.  4. Manage pain as per anesthesia.  5. Follow up appointment is on: one week      at the [place] or [place] office.

## 2024-04-30 NOTE — H&P
Diagnosis Date    Acid reflux       daily medication    Diabetes (HCC)       Mounjaro (last dose 4/21/24) and oral medication- avg fasting 140-150- no hypo sx- last A1C 8.4 per pt    H/O heart artery stent       3 stents in 2020    History of MI (myocardial infarction) 2020    Hypertension       managed with medication    Smoker              Past Surgical History:    Past Surgical History             Procedure Laterality Date    APPENDECTOMY        CHOLECYSTECTOMY        CORONARY STENT PLACEMENT        NOSE SURGERY        TOTAL KNEE ARTHROPLASTY Bilateral              Social History:    Social History            Tobacco Use    Smoking status: Every Day       Current packs/day: 1.50       Types: Cigarettes    Smokeless tobacco: Never   Substance Use Topics    Alcohol use: Not Currently                                 Ready to quit: Not Answered  Counseling given: Yes        Vital Signs (Current):   Vitals        Vitals:     04/25/24 1011 04/30/24 0530   BP:   (!) 146/76   Pulse:   74   Resp:   18   Temp:   98 °F (36.7 °C)   TempSrc:   Oral   SpO2:   95%   Weight: (!) 159.7 kg (352 lb) (!) 159.7 kg (352 lb)   Height: 2.032 m (6' 8\")                                                       BP Readings from Last 3 Encounters:   04/30/24 (!) 146/76         NPO Status: Time of last liquid consumption: 1930                        Time of last solid consumption: 1930                        Date of last liquid consumption: 04/29/24                        Date of last solid food consumption: 04/29/24     BMI:       Wt Readings from Last 3 Encounters:   04/30/24 (!) 159.7 kg (352 lb)     Body mass index is 38.67 kg/m².     CBC: No results found for: \"WBC\", \"RBC\", \"HGB\", \"HCT\", \"MCV\", \"RDW\", \"PLT\"     CMP: No results found for: \"NA\", \"K\", \"CL\", \"CO2\", \"BUN\", \"CREATININE\", \"GFRAA\", \"AGRATIO\", \"LABGLOM\", \"GLUCOSE\", \"GLU\", \"PROT\", \"CALCIUM\", \"BILITOT\", \"ALKPHOS\", \"AST\", \"ALT\"     POC Tests:       Recent Labs     04/30/24  0540

## 2024-04-30 NOTE — OP NOTE
3-0 Vicryl, 4-0 Vicryl, 4-0 Monocryl Stratafix.        POLA MACKEY/KEEGAN  D:  04/30/2024 08:45:33  T:  04/30/2024 09:21:27  JOB #:  [id number]    CC:   __________, [phone]

## 2024-04-30 NOTE — ANESTHESIA POSTPROCEDURE EVALUATION
Department of Anesthesiology  Postprocedure Note    Patient: Nilay Whittaker  MRN: [id number]  YOB: 1970  Date of evaluation: 4/30/2024    Procedure Summary       Date: 04/30/24 Room / Location: D OP OR 08 / [facility]    Anesthesia Start: 0714 Anesthesia Stop: 0849    Procedure: LEFT FOOT 5TH METATARSAL  HEAD EXCISION (Left: Foot) Diagnosis:       Type 2 diabetes mellitus with other specified complication, unspecified whether long term insulin use (HCC)      Ulcer of left foot, with fat layer exposed (HCC)      Left foot pain      (Type 2 diabetes mellitus with other specified complication, unspecified whether long term insulin use (HCC) [E11.69])      (Ulcer of left foot, with fat layer exposed (HCC) [L97.522])      (Left foot pain [M79.672])    Surgeons: Laz Fierro MD Responsible Provider: Liudmila Draper MD    Anesthesia Type: TIVA ASA Status: 3            Anesthesia Type: TIVA    Galo Phase I: Galo Score: 10    Galo Phase II: Galo Score: 10    Anesthesia Post Evaluation    Patient location during evaluation: PACU  Patient participation: complete - patient participated  Level of consciousness: awake and alert  Airway patency: patent  Nausea: well controlled.  Cardiovascular status: acceptable.  Respiratory status: acceptable  Hydration status: stable  Pain management: adequate    No notable events documented.

## 2024-04-30 NOTE — ANESTHESIA PRE PROCEDURE
chewable tablet Take 1 tablet by mouth nightly   Yes Provider, MD Vi   Omega 3-6-9 Fatty Acids (OMEGA-3 & OMEGA-6 FISH OIL PO) Take by mouth nightly   Yes Provider, MD Vi       Current medications:    Current Facility-Administered Medications   Medication Dose Route Frequency Provider Last Rate Last Admin   • sodium chloride flush 0.9 % injection 5-40 mL  5-40 mL IntraVENous 2 times per day Laz Fierro MD       • sodium chloride flush 0.9 % injection 5-40 mL  5-40 mL IntraVENous PRN Laz Fierro MD       • 0.9 % sodium chloride infusion   IntraVENous PRN Laz Fierro MD       • ceFAZolin (ANCEF) 3000 mg in sterile water 30 mL IV syringe  3,000 mg IntraVENous Once Laz Fierro MD       • lidocaine 1 % injection 1 mL  1 mL IntraDERmal Once PRN Liudmila Draper MD       • acetaminophen (TYLENOL) tablet 1,000 mg  1,000 mg Oral Once Liudmila Draper MD       • lactated ringers IV soln infusion   IntraVENous Continuous Liudmila Draper MD       • sodium chloride flush 0.9 % injection 5-40 mL  5-40 mL IntraVENous 2 times per day Liudmila Draper MD       • sodium chloride flush 0.9 % injection 5-40 mL  5-40 mL IntraVENous PRN Liudmila Draper MD       • 0.9 % sodium chloride infusion   IntraVENous PRN Liudmila Draper MD       • midazolam PF (VERSED) injection 2 mg  2 mg IntraVENous Once PRN Liudmila Draper MD           Allergies:  No Known Allergies    Problem List:    Patient Active Problem List   Diagnosis Code   • HTN (hypertension) I10   • Status post total knee replacement Z96.659   • HLD (hyperlipidemia) E78.5       Past Medical History:        Diagnosis Date   • Acid reflux     daily medication   • Diabetes (HCC)     Mounjaro (last dose 4/21/24) and oral medication- avg fasting 140-150- no hypo sx- last A1C 8.4 per pt   • H/O heart artery stent     3 stents in 2020   • History of MI (myocardial infarction) 2020   • Hypertension     managed with

## 2024-05-01 NOTE — PROGRESS NOTES
Spiritual Care visit. Initial Visit, Pre Surgery Consult.    Visit and prayer before patient goes to surgery.    Visit by Eric Hernandez M.Ed., Th.B. ,Staff

## 2024-05-03 LAB
BACTERIA SPEC CULT: NORMAL
SERVICE CMNT-IMP: NORMAL

## 2024-05-05 LAB
BACTERIA SPEC CULT: ABNORMAL
BACTERIA SPEC CULT: ABNORMAL
GRAM STN SPEC: ABNORMAL
GRAM STN SPEC: ABNORMAL
SERVICE CMNT-IMP: ABNORMAL

## 2024-05-06 LAB
BACTERIA SPEC CULT: NORMAL
SERVICE CMNT-IMP: NORMAL

## 2024-05-17 ENCOUNTER — APPOINTMENT (OUTPATIENT)
Dept: GENERAL RADIOLOGY | Age: 54
DRG: 857 | End: 2024-05-17
Payer: COMMERCIAL

## 2024-05-17 ENCOUNTER — HOSPITAL ENCOUNTER (INPATIENT)
Age: 54
LOS: 4 days | Discharge: HOME | DRG: 857 | End: 2024-05-21
Attending: EMERGENCY MEDICINE | Admitting: INTERNAL MEDICINE
Payer: COMMERCIAL

## 2024-05-17 DIAGNOSIS — M86.9 OSTEOMYELITIS OF LEFT FOOT, UNSPECIFIED TYPE (HCC): Primary | ICD-10-CM

## 2024-05-17 PROBLEM — L03.90 CELLULITIS: Status: ACTIVE | Noted: 2024-05-17

## 2024-05-17 LAB
ALBUMIN SERPL-MCNC: 3.5 G/DL (ref 3.5–5)
ALBUMIN/GLOB SERPL: 0.9 (ref 1–1.9)
ALP SERPL-CCNC: 91 U/L (ref 40–129)
ALT SERPL-CCNC: 18 U/L (ref 12–65)
ANION GAP SERPL CALC-SCNC: 13 MMOL/L (ref 9–18)
AST SERPL-CCNC: 21 U/L (ref 15–37)
BASOPHILS # BLD: 0.1 K/UL (ref 0–0.2)
BASOPHILS NFR BLD: 1 % (ref 0–2)
BILIRUB SERPL-MCNC: <0.2 MG/DL (ref 0–1.2)
BUN SERPL-MCNC: 15 MG/DL (ref 6–23)
CALCIUM SERPL-MCNC: 9.4 MG/DL (ref 8.8–10.2)
CHLORIDE SERPL-SCNC: 105 MMOL/L (ref 98–107)
CO2 SERPL-SCNC: 22 MMOL/L (ref 20–28)
CREAT SERPL-MCNC: 0.97 MG/DL (ref 0.8–1.3)
DIFFERENTIAL METHOD BLD: ABNORMAL
EOSINOPHIL # BLD: 0.2 K/UL (ref 0–0.8)
EOSINOPHIL NFR BLD: 1 % (ref 0.5–7.8)
ERYTHROCYTE [DISTWIDTH] IN BLOOD BY AUTOMATED COUNT: 13.9 % (ref 11.9–14.6)
EST. AVERAGE GLUCOSE BLD GHB EST-MCNC: 206 MG/DL
GLOBULIN SER CALC-MCNC: 4 G/DL (ref 2.3–3.5)
GLUCOSE BLD STRIP.AUTO-MCNC: 117 MG/DL (ref 65–100)
GLUCOSE SERPL-MCNC: 187 MG/DL (ref 70–99)
HBA1C MFR BLD: 8.8 % (ref 0–5.6)
HCT VFR BLD AUTO: 48.7 % (ref 41.1–50.3)
HGB BLD-MCNC: 16.1 G/DL (ref 13.6–17.2)
IMM GRANULOCYTES # BLD AUTO: 0.2 K/UL (ref 0–0.5)
IMM GRANULOCYTES NFR BLD AUTO: 1 % (ref 0–5)
LACTATE SERPL-SCNC: 1 MMOL/L (ref 0.5–2)
LACTATE SERPL-SCNC: 1.2 MMOL/L (ref 0.5–2)
LYMPHOCYTES # BLD: 3.1 K/UL (ref 0.5–4.6)
LYMPHOCYTES NFR BLD: 21 % (ref 13–44)
MCH RBC QN AUTO: 28.8 PG (ref 26.1–32.9)
MCHC RBC AUTO-ENTMCNC: 33.1 G/DL (ref 31.4–35)
MCV RBC AUTO: 87 FL (ref 82–102)
MONOCYTES # BLD: 1 K/UL (ref 0.1–1.3)
MONOCYTES NFR BLD: 7 % (ref 4–12)
NEUTS SEG # BLD: 10 K/UL (ref 1.7–8.2)
NEUTS SEG NFR BLD: 69 % (ref 43–78)
NRBC # BLD: 0 K/UL (ref 0–0.2)
PLATELET # BLD AUTO: 326 K/UL (ref 150–450)
PMV BLD AUTO: 9.4 FL (ref 9.4–12.3)
POTASSIUM SERPL-SCNC: 4.4 MMOL/L (ref 3.5–5.1)
PROCALCITONIN SERPL-MCNC: 0.05 NG/ML (ref 0–0.1)
PROT SERPL-MCNC: 7.4 G/DL (ref 6.3–8.2)
RBC # BLD AUTO: 5.6 M/UL (ref 4.23–5.6)
SERVICE CMNT-IMP: ABNORMAL
SODIUM SERPL-SCNC: 140 MMOL/L (ref 136–145)
WBC # BLD AUTO: 14.6 K/UL (ref 4.3–11.1)

## 2024-05-17 PROCEDURE — 6360000002 HC RX W HCPCS: Performed by: EMERGENCY MEDICINE

## 2024-05-17 PROCEDURE — 36415 COLL VENOUS BLD VENIPUNCTURE: CPT

## 2024-05-17 PROCEDURE — 87040 BLOOD CULTURE FOR BACTERIA: CPT

## 2024-05-17 PROCEDURE — 1100000000 HC RM PRIVATE

## 2024-05-17 PROCEDURE — 96360 HYDRATION IV INFUSION INIT: CPT

## 2024-05-17 PROCEDURE — 83605 ASSAY OF LACTIC ACID: CPT

## 2024-05-17 PROCEDURE — 84145 PROCALCITONIN (PCT): CPT

## 2024-05-17 PROCEDURE — 83036 HEMOGLOBIN GLYCOSYLATED A1C: CPT

## 2024-05-17 PROCEDURE — 87186 SC STD MICRODIL/AGAR DIL: CPT

## 2024-05-17 PROCEDURE — 2580000003 HC RX 258: Performed by: EMERGENCY MEDICINE

## 2024-05-17 PROCEDURE — 80053 COMPREHEN METABOLIC PANEL: CPT

## 2024-05-17 PROCEDURE — 99285 EMERGENCY DEPT VISIT HI MDM: CPT

## 2024-05-17 PROCEDURE — 73630 X-RAY EXAM OF FOOT: CPT

## 2024-05-17 PROCEDURE — 6360000002 HC RX W HCPCS: Performed by: INTERNAL MEDICINE

## 2024-05-17 PROCEDURE — 6370000000 HC RX 637 (ALT 250 FOR IP): Performed by: INTERNAL MEDICINE

## 2024-05-17 PROCEDURE — 87070 CULTURE OTHR SPECIMN AEROBIC: CPT

## 2024-05-17 PROCEDURE — 82962 GLUCOSE BLOOD TEST: CPT

## 2024-05-17 PROCEDURE — 2580000003 HC RX 258: Performed by: INTERNAL MEDICINE

## 2024-05-17 PROCEDURE — 85025 COMPLETE CBC W/AUTO DIFF WBC: CPT

## 2024-05-17 PROCEDURE — 87205 SMEAR GRAM STAIN: CPT

## 2024-05-17 PROCEDURE — 87077 CULTURE AEROBIC IDENTIFY: CPT

## 2024-05-17 RX ORDER — SODIUM CHLORIDE 0.9 % (FLUSH) 0.9 %
5-40 SYRINGE (ML) INJECTION EVERY 12 HOURS SCHEDULED
Status: DISCONTINUED | OUTPATIENT
Start: 2024-05-17 | End: 2024-05-21 | Stop reason: HOSPADM

## 2024-05-17 RX ORDER — MAGNESIUM SULFATE IN WATER 40 MG/ML
2000 INJECTION, SOLUTION INTRAVENOUS PRN
Status: DISCONTINUED | OUTPATIENT
Start: 2024-05-17 | End: 2024-05-21 | Stop reason: HOSPADM

## 2024-05-17 RX ORDER — INSULIN LISPRO 100 [IU]/ML
0-8 INJECTION, SOLUTION INTRAVENOUS; SUBCUTANEOUS
Status: DISCONTINUED | OUTPATIENT
Start: 2024-05-18 | End: 2024-05-21 | Stop reason: HOSPADM

## 2024-05-17 RX ORDER — ACETAMINOPHEN 650 MG/1
650 SUPPOSITORY RECTAL EVERY 6 HOURS PRN
Status: DISCONTINUED | OUTPATIENT
Start: 2024-05-17 | End: 2024-05-21 | Stop reason: HOSPADM

## 2024-05-17 RX ORDER — METOPROLOL SUCCINATE 50 MG/1
50 TABLET, EXTENDED RELEASE ORAL
Status: DISCONTINUED | OUTPATIENT
Start: 2024-05-18 | End: 2024-05-21 | Stop reason: HOSPADM

## 2024-05-17 RX ORDER — SODIUM CHLORIDE 9 MG/ML
INJECTION, SOLUTION INTRAVENOUS PRN
Status: DISCONTINUED | OUTPATIENT
Start: 2024-05-17 | End: 2024-05-21 | Stop reason: HOSPADM

## 2024-05-17 RX ORDER — CETIRIZINE HYDROCHLORIDE 10 MG/1
10 TABLET ORAL NIGHTLY
Status: DISCONTINUED | OUTPATIENT
Start: 2024-05-18 | End: 2024-05-21 | Stop reason: HOSPADM

## 2024-05-17 RX ORDER — ONDANSETRON 2 MG/ML
4 INJECTION INTRAMUSCULAR; INTRAVENOUS EVERY 6 HOURS PRN
Status: DISCONTINUED | OUTPATIENT
Start: 2024-05-17 | End: 2024-05-21 | Stop reason: HOSPADM

## 2024-05-17 RX ORDER — DEXTROSE MONOHYDRATE 100 MG/ML
INJECTION, SOLUTION INTRAVENOUS CONTINUOUS PRN
Status: DISCONTINUED | OUTPATIENT
Start: 2024-05-17 | End: 2024-05-21 | Stop reason: HOSPADM

## 2024-05-17 RX ORDER — IBUPROFEN 600 MG/1
1 TABLET ORAL PRN
Status: DISCONTINUED | OUTPATIENT
Start: 2024-05-17 | End: 2024-05-21 | Stop reason: HOSPADM

## 2024-05-17 RX ORDER — SODIUM CHLORIDE 0.9 % (FLUSH) 0.9 %
5-40 SYRINGE (ML) INJECTION PRN
Status: DISCONTINUED | OUTPATIENT
Start: 2024-05-17 | End: 2024-05-21 | Stop reason: HOSPADM

## 2024-05-17 RX ORDER — ACETAMINOPHEN 325 MG/1
650 TABLET ORAL EVERY 6 HOURS PRN
Status: DISCONTINUED | OUTPATIENT
Start: 2024-05-17 | End: 2024-05-21 | Stop reason: HOSPADM

## 2024-05-17 RX ORDER — POLYETHYLENE GLYCOL 3350 17 G/17G
17 POWDER, FOR SOLUTION ORAL DAILY PRN
Status: DISCONTINUED | OUTPATIENT
Start: 2024-05-17 | End: 2024-05-21 | Stop reason: HOSPADM

## 2024-05-17 RX ORDER — ASPIRIN 81 MG/1
81 TABLET, CHEWABLE ORAL
Status: DISCONTINUED | OUTPATIENT
Start: 2024-05-17 | End: 2024-05-17 | Stop reason: SDUPTHER

## 2024-05-17 RX ORDER — ONDANSETRON 4 MG/1
4 TABLET, ORALLY DISINTEGRATING ORAL EVERY 8 HOURS PRN
Status: DISCONTINUED | OUTPATIENT
Start: 2024-05-17 | End: 2024-05-21 | Stop reason: HOSPADM

## 2024-05-17 RX ORDER — MORPHINE SULFATE 2 MG/ML
2 INJECTION, SOLUTION INTRAMUSCULAR; INTRAVENOUS EVERY 4 HOURS PRN
Status: DISCONTINUED | OUTPATIENT
Start: 2024-05-17 | End: 2024-05-21 | Stop reason: HOSPADM

## 2024-05-17 RX ORDER — INSULIN LISPRO 100 [IU]/ML
0-4 INJECTION, SOLUTION INTRAVENOUS; SUBCUTANEOUS NIGHTLY
Status: DISCONTINUED | OUTPATIENT
Start: 2024-05-17 | End: 2024-05-21 | Stop reason: HOSPADM

## 2024-05-17 RX ORDER — 0.9 % SODIUM CHLORIDE 0.9 %
1000 INTRAVENOUS SOLUTION INTRAVENOUS
Status: COMPLETED | OUTPATIENT
Start: 2024-05-17 | End: 2024-05-17

## 2024-05-17 RX ORDER — GLIPIZIDE 5 MG/1
10 TABLET ORAL
Status: DISCONTINUED | OUTPATIENT
Start: 2024-05-18 | End: 2024-05-21 | Stop reason: HOSPADM

## 2024-05-17 RX ORDER — POTASSIUM CHLORIDE 20 MEQ/1
40 TABLET, EXTENDED RELEASE ORAL PRN
Status: DISCONTINUED | OUTPATIENT
Start: 2024-05-17 | End: 2024-05-21 | Stop reason: HOSPADM

## 2024-05-17 RX ORDER — MONTELUKAST SODIUM 10 MG/1
10 TABLET ORAL NIGHTLY
Status: DISCONTINUED | OUTPATIENT
Start: 2024-05-18 | End: 2024-05-21 | Stop reason: HOSPADM

## 2024-05-17 RX ORDER — PANTOPRAZOLE SODIUM 40 MG/1
40 TABLET, DELAYED RELEASE ORAL
Status: DISCONTINUED | OUTPATIENT
Start: 2024-05-18 | End: 2024-05-21 | Stop reason: HOSPADM

## 2024-05-17 RX ORDER — ASPIRIN 81 MG/1
81 TABLET, CHEWABLE ORAL
Status: DISCONTINUED | OUTPATIENT
Start: 2024-05-18 | End: 2024-05-21 | Stop reason: HOSPADM

## 2024-05-17 RX ORDER — ROSUVASTATIN CALCIUM 10 MG/1
40 TABLET, COATED ORAL EVERY EVENING
Status: DISCONTINUED | OUTPATIENT
Start: 2024-05-18 | End: 2024-05-21 | Stop reason: HOSPADM

## 2024-05-17 RX ORDER — POTASSIUM CHLORIDE 7.45 MG/ML
10 INJECTION INTRAVENOUS PRN
Status: DISCONTINUED | OUTPATIENT
Start: 2024-05-17 | End: 2024-05-21 | Stop reason: HOSPADM

## 2024-05-17 RX ORDER — HYDROCODONE BITARTRATE AND ACETAMINOPHEN 5; 325 MG/1; MG/1
1 TABLET ORAL EVERY 6 HOURS PRN
Status: DISCONTINUED | OUTPATIENT
Start: 2024-05-17 | End: 2024-05-21 | Stop reason: HOSPADM

## 2024-05-17 RX ORDER — LISINOPRIL 20 MG/1
40 TABLET ORAL
Status: DISCONTINUED | OUTPATIENT
Start: 2024-05-18 | End: 2024-05-21 | Stop reason: HOSPADM

## 2024-05-17 RX ORDER — AMLODIPINE BESYLATE 10 MG/1
10 TABLET ORAL
Status: DISCONTINUED | OUTPATIENT
Start: 2024-05-18 | End: 2024-05-21 | Stop reason: HOSPADM

## 2024-05-17 RX ORDER — ENOXAPARIN SODIUM 100 MG/ML
40 INJECTION SUBCUTANEOUS 2 TIMES DAILY
Status: DISCONTINUED | OUTPATIENT
Start: 2024-05-17 | End: 2024-05-21 | Stop reason: HOSPADM

## 2024-05-17 RX ORDER — PREGABALIN 100 MG/1
300 CAPSULE ORAL 2 TIMES DAILY
Status: DISCONTINUED | OUTPATIENT
Start: 2024-05-17 | End: 2024-05-21 | Stop reason: HOSPADM

## 2024-05-17 RX ADMIN — HYDROCODONE BITARTRATE AND ACETAMINOPHEN 1 TABLET: 5; 325 TABLET ORAL at 23:55

## 2024-05-17 RX ADMIN — Medication 2500 MG: at 21:30

## 2024-05-17 RX ADMIN — PIPERACILLIN AND TAZOBACTAM 3375 MG: 3; .375 INJECTION, POWDER, LYOPHILIZED, FOR SOLUTION INTRAVENOUS at 23:58

## 2024-05-17 RX ADMIN — PIPERACILLIN AND TAZOBACTAM 4500 MG: 4; .5 INJECTION, POWDER, LYOPHILIZED, FOR SOLUTION INTRAVENOUS at 17:53

## 2024-05-17 RX ADMIN — PREGABALIN 300 MG: 100 CAPSULE ORAL at 21:35

## 2024-05-17 RX ADMIN — ONDANSETRON 4 MG: 2 INJECTION INTRAMUSCULAR; INTRAVENOUS at 21:34

## 2024-05-17 RX ADMIN — SODIUM CHLORIDE, PRESERVATIVE FREE 10 ML: 5 INJECTION INTRAVENOUS at 21:35

## 2024-05-17 RX ADMIN — MORPHINE SULFATE 2 MG: 2 INJECTION, SOLUTION INTRAMUSCULAR; INTRAVENOUS at 21:34

## 2024-05-17 RX ADMIN — ENOXAPARIN SODIUM 40 MG: 100 INJECTION SUBCUTANEOUS at 21:35

## 2024-05-17 RX ADMIN — SODIUM CHLORIDE 1000 ML: 9 INJECTION, SOLUTION INTRAVENOUS at 15:39

## 2024-05-17 ASSESSMENT — PAIN DESCRIPTION - ORIENTATION
ORIENTATION: LEFT
ORIENTATION: LEFT

## 2024-05-17 ASSESSMENT — PAIN SCALES - GENERAL
PAINLEVEL_OUTOF10: 8
PAINLEVEL_OUTOF10: 10
PAINLEVEL_OUTOF10: 7
PAINLEVEL_OUTOF10: 5

## 2024-05-17 ASSESSMENT — LIFESTYLE VARIABLES
HOW MANY STANDARD DRINKS CONTAINING ALCOHOL DO YOU HAVE ON A TYPICAL DAY: PATIENT DOES NOT DRINK
HOW OFTEN DO YOU HAVE A DRINK CONTAINING ALCOHOL: NEVER

## 2024-05-17 ASSESSMENT — PAIN DESCRIPTION - LOCATION
LOCATION: LEG;FOOT
LOCATION: FOOT

## 2024-05-17 NOTE — ED TRIAGE NOTES
Pt c/o lt pinky toe turning black. Pt had foot surgery recently related to osteomyelitis. Pt denies fever, nausea, vomiting. Pt has hx of dm and neuropathy.

## 2024-05-17 NOTE — H&P
Hospitalist History and Physical   Admit Date:  2024  2:07 PM   Name:  Nilay Whittaker   Age:  53 y.o.  Sex:  male  :  1970   MRN:  [id number]   Room:  [facility]/    Presenting/Chief Complaint: Wound Infection     Reason(s) for Admission: Cellulitis [L03.90]  Osteomyelitis of left foot, unspecified type (HCC) [M86.9]     History of Present Illness:   Nilay Whittaker is a 53 y.o. male with medical history of DM2, HTN, HLP Patient had surgery 2 weeks ago by Dr. Fierro with 5th metatarsal  head excision for concern of osteomyelitis.  Pt presents today with worsening drainage and odor of wound despite taking Augmentin and more recently linezolid.   Tissue culture from surgery  pos for MRSA    Bmp - unremarkable  CBC - WBC 14K  Toe xray  IMPRESSION:     Osteopenia with ill-defined lucencies of the proximal aspect of the fifth  proximal digit. Osteomyelitis is not excluded and follow-up MRI is recommended.     Status post transmetatarsal amputation of the fifth metatarsal.    HM asked to admit for concerns of cellulitis, acute/chronic osteomyelitis.     Assessment & Plan:     # Cellulitis  # 5th left toe osteomyelitis  - continue vanco/zosyn  - follow for wound and blood cultures  - consult to orthopedic surgery  - may also need ID input, will defer for now  - NPO a/m incase procedure needed in AM  - order MRI left foot.     # HTN  - resume norvasc, toprol, lisinopril  - monitor and titrate as needed    # Dm  - holding moujaro  - continue jardiance, glucotrol, SSI  - check A1C    # Hlp  - cont crestor    PT/OT evals and PPD ordered?  Not ordered; patient not expected to need rehab  Diet: No diet orders on file  VTE prophylaxis: Lovenox  Code status: Prior      Non-peripheral Lines and Tubes (if present):             Hospital Problems:  Principal Problem:    Cellulitis  Resolved Problems:    * No resolved hospital problems. *        Objective:   Patient Vitals for the past 24 hrs:   Temp

## 2024-05-17 NOTE — ED NOTES
TRANSFER - OUT REPORT:    Verbal report given to ROSHAN Witt on Nilay Whittaker  being transferred to [facility] for routine progression of patient care       Report consisted of patient's Situation, Background, Assessment and   Recommendations(SBAR).     Information from the following report(s) ED SBAR was reviewed with the receiving nurse.    Judsonia Fall Assessment:    Presents to emergency department  because of falls (Syncope, seizure, or loss of consciousness): No  Age > 70: No  Altered Mental Status, Intoxication with alcohol or substance confusion (Disorientation, impaired judgment, poor safety awaremess, or inability to follow instructions): No  Impaired Mobility: Ambulates or transfers with assistive devices or assistance; Unable to ambulate or transer.: No  Nursing Judgement: No          Lines:   Peripheral IV 05/17/24 Right Antecubital (Active)   Site Assessment Clean, dry & intact 05/17/24 1503   Phlebitis Assessment No symptoms 05/17/24 1503   Infiltration Assessment 0 05/17/24 1503       Peripheral IV 05/17/24 Left Antecubital (Active)        Opportunity for questions and clarification was provided.      Patient transported with:  Paddy Richmond RN  05/17/24 [id number]

## 2024-05-17 NOTE — ED PROVIDER NOTES
Emergency Department Provider Note       PCP: Sania Duggan APRN - NP   Age: 53 y.o.   Sex: male     DISPOSITION Decision To Admit 05/17/2024 05:38:48 PM       ICD-10-CM    1. Osteomyelitis of left foot, unspecified type (HCC)  M86.9           Medical Decision Making     Patient with recent surgery to left foot base of the small toe.  Pathology showed osteomyelitis.  Has been on Augmentin but wound is getting worse now with drainage and odor.  X-ray shows some bone lucencies in the area.  Will treat as osteomyelitis and admit for further IV antibiotics.     1 or more acute illnesses that pose a threat to life or bodily function.   Prescription drug management performed.  Shared medical decision making was utilized in creating the patients health plan today.    I independently ordered and reviewed each unique test.  I reviewed external records: provider visit note from outside specialist.     I interpreted the X-rays no fracture.    The patient was admitted and I have discussed patient management with the admitting provider.      Critical care procedure note : 35 minutes of critical care time was performed in the emergency department. This was separate from any other procedures listed during the patients emergency department course. The failure to initiate these interventions on an urgent basis would likely have resulted in sudden, clinically significant or life-threatening deterioration in the patients condition.     History     Patient with diabetes and nonhealing ulcer to the palm of the left foot below the small toe.  Had surgery 2 weeks ago by Dr. Fierro to remove the metatarsal.  After removal and pathology was found to have osteomyelitis.  Antibiotics were started with wound developing a drainage 3 days after surgery.  Antibiotics seem to be working up until couple days ago when drainage increased.  For 5 days ago patient has had some black discoloration to the skin around the area.  Called his doctor

## 2024-05-18 ENCOUNTER — APPOINTMENT (OUTPATIENT)
Dept: MRI IMAGING | Age: 54
DRG: 857 | End: 2024-05-18
Payer: COMMERCIAL

## 2024-05-18 ENCOUNTER — ANESTHESIA EVENT (OUTPATIENT)
Dept: SURGERY | Age: 54
DRG: 857 | End: 2024-05-18
Payer: COMMERCIAL

## 2024-05-18 LAB
ALBUMIN SERPL-MCNC: 3.1 G/DL (ref 3.5–5)
ALBUMIN/GLOB SERPL: 0.8 (ref 1–1.9)
ALP SERPL-CCNC: 76 U/L (ref 40–129)
ALT SERPL-CCNC: 8 U/L (ref 12–65)
ANION GAP SERPL CALC-SCNC: 12 MMOL/L (ref 9–18)
AST SERPL-CCNC: 21 U/L (ref 15–37)
BASOPHILS # BLD: 0.1 K/UL (ref 0–0.2)
BASOPHILS NFR BLD: 1 % (ref 0–2)
BILIRUB SERPL-MCNC: 0.2 MG/DL (ref 0–1.2)
BUN SERPL-MCNC: 15 MG/DL (ref 6–23)
CALCIUM SERPL-MCNC: 8.9 MG/DL (ref 8.8–10.2)
CHLORIDE SERPL-SCNC: 105 MMOL/L (ref 98–107)
CO2 SERPL-SCNC: 22 MMOL/L (ref 20–28)
CREAT SERPL-MCNC: 0.73 MG/DL (ref 0.8–1.3)
DIFFERENTIAL METHOD BLD: NORMAL
EOSINOPHIL # BLD: 0.2 K/UL (ref 0–0.8)
EOSINOPHIL NFR BLD: 2 % (ref 0.5–7.8)
ERYTHROCYTE [DISTWIDTH] IN BLOOD BY AUTOMATED COUNT: 14 % (ref 11.9–14.6)
GLOBULIN SER CALC-MCNC: 3.9 G/DL (ref 2.3–3.5)
GLUCOSE BLD STRIP.AUTO-MCNC: 138 MG/DL (ref 65–100)
GLUCOSE BLD STRIP.AUTO-MCNC: 183 MG/DL (ref 65–100)
GLUCOSE BLD STRIP.AUTO-MCNC: 251 MG/DL (ref 65–100)
GLUCOSE SERPL-MCNC: 107 MG/DL (ref 70–99)
HCT VFR BLD AUTO: 44.8 % (ref 41.1–50.3)
HGB BLD-MCNC: 14.4 G/DL (ref 13.6–17.2)
IMM GRANULOCYTES # BLD AUTO: 0.1 K/UL (ref 0–0.5)
IMM GRANULOCYTES NFR BLD AUTO: 2 % (ref 0–5)
LYMPHOCYTES # BLD: 3.5 K/UL (ref 0.5–4.6)
LYMPHOCYTES NFR BLD: 41 % (ref 13–44)
MCH RBC QN AUTO: 28.2 PG (ref 26.1–32.9)
MCHC RBC AUTO-ENTMCNC: 32.1 G/DL (ref 31.4–35)
MCV RBC AUTO: 87.8 FL (ref 82–102)
MONOCYTES # BLD: 0.8 K/UL (ref 0.1–1.3)
MONOCYTES NFR BLD: 9 % (ref 4–12)
NEUTS SEG # BLD: 4 K/UL (ref 1.7–8.2)
NEUTS SEG NFR BLD: 45 % (ref 43–78)
NRBC # BLD: 0 K/UL (ref 0–0.2)
PLATELET # BLD AUTO: 283 K/UL (ref 150–450)
PMV BLD AUTO: 9.4 FL (ref 9.4–12.3)
POTASSIUM SERPL-SCNC: 4.4 MMOL/L (ref 3.5–5.1)
PROT SERPL-MCNC: 7 G/DL (ref 6.3–8.2)
RBC # BLD AUTO: 5.1 M/UL (ref 4.23–5.6)
SERVICE CMNT-IMP: ABNORMAL
SODIUM SERPL-SCNC: 139 MMOL/L (ref 136–145)
WBC # BLD AUTO: 8.6 K/UL (ref 4.3–11.1)

## 2024-05-18 PROCEDURE — 73720 MRI LWR EXTREMITY W/O&W/DYE: CPT

## 2024-05-18 PROCEDURE — 2580000003 HC RX 258: Performed by: ORTHOPAEDIC SURGERY

## 2024-05-18 PROCEDURE — 99252 IP/OBS CONSLTJ NEW/EST SF 35: CPT | Performed by: PHYSICIAN ASSISTANT

## 2024-05-18 PROCEDURE — 80053 COMPREHEN METABOLIC PANEL: CPT

## 2024-05-18 PROCEDURE — 1100000000 HC RM PRIVATE

## 2024-05-18 PROCEDURE — 36415 COLL VENOUS BLD VENIPUNCTURE: CPT

## 2024-05-18 PROCEDURE — 6370000000 HC RX 637 (ALT 250 FOR IP): Performed by: INTERNAL MEDICINE

## 2024-05-18 PROCEDURE — 6360000002 HC RX W HCPCS: Performed by: INTERNAL MEDICINE

## 2024-05-18 PROCEDURE — A9579 GAD-BASE MR CONTRAST NOS,1ML: HCPCS | Performed by: INTERNAL MEDICINE

## 2024-05-18 PROCEDURE — 6360000002 HC RX W HCPCS: Performed by: ORTHOPAEDIC SURGERY

## 2024-05-18 PROCEDURE — 2580000003 HC RX 258: Performed by: INTERNAL MEDICINE

## 2024-05-18 PROCEDURE — 6360000004 HC RX CONTRAST MEDICATION: Performed by: INTERNAL MEDICINE

## 2024-05-18 PROCEDURE — 85025 COMPLETE CBC W/AUTO DIFF WBC: CPT

## 2024-05-18 PROCEDURE — 82962 GLUCOSE BLOOD TEST: CPT

## 2024-05-18 RX ORDER — POLYETHYLENE GLYCOL 3350 17 G
4 POWDER IN PACKET (EA) ORAL
Status: DISCONTINUED | OUTPATIENT
Start: 2024-05-18 | End: 2024-05-21 | Stop reason: HOSPADM

## 2024-05-18 RX ADMIN — NICOTINE POLACRILEX 4 MG: 2 LOZENGE ORAL at 16:51

## 2024-05-18 RX ADMIN — PREGABALIN 300 MG: 100 CAPSULE ORAL at 09:34

## 2024-05-18 RX ADMIN — PREGABALIN 300 MG: 100 CAPSULE ORAL at 20:31

## 2024-05-18 RX ADMIN — PIPERACILLIN AND TAZOBACTAM 3375 MG: 3; .375 INJECTION, POWDER, LYOPHILIZED, FOR SOLUTION INTRAVENOUS at 09:38

## 2024-05-18 RX ADMIN — MONTELUKAST 10 MG: 10 TABLET, FILM COATED ORAL at 20:31

## 2024-05-18 RX ADMIN — CETIRIZINE HYDROCHLORIDE 10 MG: 10 TABLET, FILM COATED ORAL at 20:30

## 2024-05-18 RX ADMIN — ROSUVASTATIN CALCIUM 40 MG: 10 TABLET, FILM COATED ORAL at 16:51

## 2024-05-18 RX ADMIN — MORPHINE SULFATE 2 MG: 2 INJECTION, SOLUTION INTRAMUSCULAR; INTRAVENOUS at 06:04

## 2024-05-18 RX ADMIN — PIPERACILLIN AND TAZOBACTAM 3375 MG: 3; .375 INJECTION, POWDER, LYOPHILIZED, FOR SOLUTION INTRAVENOUS at 16:36

## 2024-05-18 RX ADMIN — HYDROCODONE BITARTRATE AND ACETAMINOPHEN 1 TABLET: 5; 325 TABLET ORAL at 09:42

## 2024-05-18 RX ADMIN — SODIUM CHLORIDE, PRESERVATIVE FREE 10 ML: 5 INJECTION INTRAVENOUS at 09:48

## 2024-05-18 RX ADMIN — AMLODIPINE BESYLATE 10 MG: 10 TABLET ORAL at 20:30

## 2024-05-18 RX ADMIN — GLIPIZIDE 10 MG: 5 TABLET ORAL at 09:34

## 2024-05-18 RX ADMIN — MORPHINE SULFATE 2 MG: 2 INJECTION, SOLUTION INTRAMUSCULAR; INTRAVENOUS at 20:30

## 2024-05-18 RX ADMIN — LISINOPRIL 40 MG: 20 TABLET ORAL at 20:30

## 2024-05-18 RX ADMIN — PANTOPRAZOLE SODIUM 40 MG: 40 TABLET, DELAYED RELEASE ORAL at 06:05

## 2024-05-18 RX ADMIN — ASPIRIN 81 MG 81 MG: 81 TABLET ORAL at 20:30

## 2024-05-18 RX ADMIN — ENOXAPARIN SODIUM 40 MG: 100 INJECTION SUBCUTANEOUS at 09:35

## 2024-05-18 RX ADMIN — MORPHINE SULFATE 2 MG: 2 INJECTION, SOLUTION INTRAMUSCULAR; INTRAVENOUS at 16:36

## 2024-05-18 RX ADMIN — GADOTERIDOL 30 ML: 279.3 INJECTION, SOLUTION INTRAVENOUS at 14:50

## 2024-05-18 RX ADMIN — SODIUM CHLORIDE, PRESERVATIVE FREE 10 ML: 5 INJECTION INTRAVENOUS at 20:23

## 2024-05-18 RX ADMIN — METOPROLOL SUCCINATE 50 MG: 50 TABLET, EXTENDED RELEASE ORAL at 20:30

## 2024-05-18 RX ADMIN — VANCOMYCIN HYDROCHLORIDE 2000 MG: 10 INJECTION, POWDER, LYOPHILIZED, FOR SOLUTION INTRAVENOUS at 16:26

## 2024-05-18 RX ADMIN — MORPHINE SULFATE 2 MG: 2 INJECTION, SOLUTION INTRAMUSCULAR; INTRAVENOUS at 01:19

## 2024-05-18 RX ADMIN — ENOXAPARIN SODIUM 40 MG: 100 INJECTION SUBCUTANEOUS at 20:31

## 2024-05-18 ASSESSMENT — PAIN DESCRIPTION - DESCRIPTORS
DESCRIPTORS: ACHING
DESCRIPTORS: ACHING

## 2024-05-18 ASSESSMENT — PAIN DESCRIPTION - ORIENTATION
ORIENTATION: LEFT
ORIENTATION: LEFT

## 2024-05-18 ASSESSMENT — PAIN DESCRIPTION - LOCATION
LOCATION: FOOT

## 2024-05-18 ASSESSMENT — PAIN SCALES - GENERAL
PAINLEVEL_OUTOF10: 8
PAINLEVEL_OUTOF10: 6
PAINLEVEL_OUTOF10: 9
PAINLEVEL_OUTOF10: 4
PAINLEVEL_OUTOF10: 9

## 2024-05-18 NOTE — CONSULTS
[facility]  Consultation Note    Patient ID:  Name: Nilay Whittaker  MRN: [id number]  AGE: 53 y.o.  : 1970    Date of Consultation:  May 18, 2024  Referring Physician:  Hospitalist     Subjective: Pt complains of left foot infection that started 2 months ago with the wound on the plantar aspect of his foot and he had been seeing podiatry for this they ultimately did surgery and did a debridement of the lateral aspect of the left forefoot and sent cultures that grew MRSA.  He was switched to a different antibiotic that was helping but 2 days ago the foot became worse and had more drainage.  They presented to the [place] [facility] yesterday. They were found to have postoperative wound infection left foot. They were admitted by the hospitalist.  He has some tenderness discomfort the left foot.. They have no other orthopedic concerns at this time.      Past Medical History Includes:   Past Medical History:   Diagnosis Date    Acid reflux     daily medication    Diabetes (HCC)     Mounjaro (last dose 24) and oral medication- avg fasting 140-150- no hypo sx- last A1C 8.4 per pt    H/O heart artery stent     3 stents in     History of MI (myocardial infarction)     Hypertension     managed with medication    Smoker    ,   Past Surgical History:   Procedure Laterality Date    APPENDECTOMY      CHOLECYSTECTOMY      CORONARY STENT PLACEMENT      FOOT SURGERY Left 2024    LEFT FOOT 5TH METATARSAL  HEAD EXCISION performed by Laz Fierro MD at [facility]    NOSE SURGERY      TOTAL KNEE ARTHROPLASTY Bilateral      Family History: No family history on file.   Social History:   Social History     Tobacco Use    Smoking status: Every Day     Current packs/day: 1.50     Types: Cigarettes    Smokeless tobacco: Never   Substance Use Topics    Alcohol use: Not Currently       ALLERGIES: No Known Allergies     Patient Medications    Current Facility-Administered

## 2024-05-19 ENCOUNTER — ANESTHESIA (OUTPATIENT)
Dept: SURGERY | Age: 54
DRG: 857 | End: 2024-05-19
Payer: COMMERCIAL

## 2024-05-19 LAB
ANION GAP SERPL CALC-SCNC: 12 MMOL/L (ref 9–18)
BASOPHILS # BLD: 0.1 K/UL (ref 0–0.2)
BASOPHILS NFR BLD: 1 % (ref 0–2)
BUN SERPL-MCNC: 14 MG/DL (ref 6–23)
CALCIUM SERPL-MCNC: 8.9 MG/DL (ref 8.8–10.2)
CHLORIDE SERPL-SCNC: 105 MMOL/L (ref 98–107)
CO2 SERPL-SCNC: 21 MMOL/L (ref 20–28)
CREAT SERPL-MCNC: 0.8 MG/DL (ref 0.8–1.3)
DIFFERENTIAL METHOD BLD: ABNORMAL
EOSINOPHIL # BLD: 0.2 K/UL (ref 0–0.8)
EOSINOPHIL NFR BLD: 2 % (ref 0.5–7.8)
ERYTHROCYTE [DISTWIDTH] IN BLOOD BY AUTOMATED COUNT: 14.2 % (ref 11.9–14.6)
GLUCOSE BLD STRIP.AUTO-MCNC: 139 MG/DL (ref 65–100)
GLUCOSE BLD STRIP.AUTO-MCNC: 170 MG/DL (ref 65–100)
GLUCOSE BLD STRIP.AUTO-MCNC: 176 MG/DL (ref 65–100)
GLUCOSE BLD STRIP.AUTO-MCNC: 210 MG/DL (ref 65–100)
GLUCOSE SERPL-MCNC: 202 MG/DL (ref 70–99)
HCT VFR BLD AUTO: 46.5 % (ref 41.1–50.3)
HGB BLD-MCNC: 14.9 G/DL (ref 13.6–17.2)
IMM GRANULOCYTES # BLD AUTO: 0.1 K/UL (ref 0–0.5)
IMM GRANULOCYTES NFR BLD AUTO: 2 % (ref 0–5)
LYMPHOCYTES # BLD: 2.5 K/UL (ref 0.5–4.6)
LYMPHOCYTES NFR BLD: 29 % (ref 13–44)
MCH RBC QN AUTO: 28.2 PG (ref 26.1–32.9)
MCHC RBC AUTO-ENTMCNC: 32 G/DL (ref 31.4–35)
MCV RBC AUTO: 88.1 FL (ref 82–102)
MONOCYTES # BLD: 0.7 K/UL (ref 0.1–1.3)
MONOCYTES NFR BLD: 8 % (ref 4–12)
NEUTS SEG # BLD: 5 K/UL (ref 1.7–8.2)
NEUTS SEG NFR BLD: 58 % (ref 43–78)
NRBC # BLD: 0 K/UL (ref 0–0.2)
PLATELET # BLD AUTO: 258 K/UL (ref 150–450)
PMV BLD AUTO: 9.2 FL (ref 9.4–12.3)
POTASSIUM SERPL-SCNC: 4.3 MMOL/L (ref 3.5–5.1)
RBC # BLD AUTO: 5.28 M/UL (ref 4.23–5.6)
SERVICE CMNT-IMP: ABNORMAL
SODIUM SERPL-SCNC: 138 MMOL/L (ref 136–145)
VANCOMYCIN SERPL-MCNC: 13.2 UG/ML
WBC # BLD AUTO: 8.5 K/UL (ref 4.3–11.1)

## 2024-05-19 PROCEDURE — 80202 ASSAY OF VANCOMYCIN: CPT

## 2024-05-19 PROCEDURE — 1100000000 HC RM PRIVATE

## 2024-05-19 PROCEDURE — 80048 BASIC METABOLIC PNL TOTAL CA: CPT

## 2024-05-19 PROCEDURE — 6360000002 HC RX W HCPCS: Performed by: ORTHOPAEDIC SURGERY

## 2024-05-19 PROCEDURE — 85025 COMPLETE CBC W/AUTO DIFF WBC: CPT

## 2024-05-19 PROCEDURE — 82962 GLUCOSE BLOOD TEST: CPT

## 2024-05-19 PROCEDURE — 36415 COLL VENOUS BLD VENIPUNCTURE: CPT

## 2024-05-19 PROCEDURE — 2580000003 HC RX 258: Performed by: INTERNAL MEDICINE

## 2024-05-19 PROCEDURE — 6370000000 HC RX 637 (ALT 250 FOR IP): Performed by: INTERNAL MEDICINE

## 2024-05-19 PROCEDURE — 6360000002 HC RX W HCPCS: Performed by: INTERNAL MEDICINE

## 2024-05-19 PROCEDURE — 2580000003 HC RX 258: Performed by: ORTHOPAEDIC SURGERY

## 2024-05-19 RX ADMIN — MORPHINE SULFATE 2 MG: 2 INJECTION, SOLUTION INTRAMUSCULAR; INTRAVENOUS at 23:58

## 2024-05-19 RX ADMIN — PIPERACILLIN AND TAZOBACTAM 3375 MG: 3; .375 INJECTION, POWDER, LYOPHILIZED, FOR SOLUTION INTRAVENOUS at 07:35

## 2024-05-19 RX ADMIN — ASPIRIN 81 MG 81 MG: 81 TABLET ORAL at 23:58

## 2024-05-19 RX ADMIN — PREGABALIN 300 MG: 100 CAPSULE ORAL at 08:50

## 2024-05-19 RX ADMIN — GLIPIZIDE 10 MG: 5 TABLET ORAL at 08:50

## 2024-05-19 RX ADMIN — ROSUVASTATIN CALCIUM 40 MG: 10 TABLET, FILM COATED ORAL at 17:06

## 2024-05-19 RX ADMIN — PIPERACILLIN AND TAZOBACTAM 3375 MG: 3; .375 INJECTION, POWDER, LYOPHILIZED, FOR SOLUTION INTRAVENOUS at 01:38

## 2024-05-19 RX ADMIN — ENOXAPARIN SODIUM 40 MG: 100 INJECTION SUBCUTANEOUS at 08:51

## 2024-05-19 RX ADMIN — METOPROLOL SUCCINATE 50 MG: 50 TABLET, EXTENDED RELEASE ORAL at 23:58

## 2024-05-19 RX ADMIN — VANCOMYCIN HYDROCHLORIDE 2000 MG: 10 INJECTION, POWDER, LYOPHILIZED, FOR SOLUTION INTRAVENOUS at 13:56

## 2024-05-19 RX ADMIN — HYDROCODONE BITARTRATE AND ACETAMINOPHEN 1 TABLET: 5; 325 TABLET ORAL at 16:03

## 2024-05-19 RX ADMIN — VANCOMYCIN HYDROCHLORIDE 2000 MG: 10 INJECTION, POWDER, LYOPHILIZED, FOR SOLUTION INTRAVENOUS at 02:33

## 2024-05-19 RX ADMIN — MORPHINE SULFATE 2 MG: 2 INJECTION, SOLUTION INTRAMUSCULAR; INTRAVENOUS at 01:39

## 2024-05-19 RX ADMIN — PREGABALIN 300 MG: 100 CAPSULE ORAL at 23:58

## 2024-05-19 RX ADMIN — MONTELUKAST 10 MG: 10 TABLET, FILM COATED ORAL at 23:58

## 2024-05-19 RX ADMIN — SODIUM CHLORIDE, PRESERVATIVE FREE 10 ML: 5 INJECTION INTRAVENOUS at 07:29

## 2024-05-19 RX ADMIN — PANTOPRAZOLE SODIUM 40 MG: 40 TABLET, DELAYED RELEASE ORAL at 06:33

## 2024-05-19 RX ADMIN — PIPERACILLIN AND TAZOBACTAM 3375 MG: 3; .375 INJECTION, POWDER, LYOPHILIZED, FOR SOLUTION INTRAVENOUS at 16:00

## 2024-05-19 RX ADMIN — INSULIN LISPRO 2 UNITS: 100 INJECTION, SOLUTION INTRAVENOUS; SUBCUTANEOUS at 11:59

## 2024-05-19 RX ADMIN — LISINOPRIL 40 MG: 20 TABLET ORAL at 23:58

## 2024-05-19 ASSESSMENT — PAIN SCALES - GENERAL
PAINLEVEL_OUTOF10: 9
PAINLEVEL_OUTOF10: 6
PAINLEVEL_OUTOF10: 9
PAINLEVEL_OUTOF10: 0

## 2024-05-19 ASSESSMENT — PAIN DESCRIPTION - LOCATION
LOCATION: FOOT

## 2024-05-19 ASSESSMENT — PAIN DESCRIPTION - DESCRIPTORS: DESCRIPTORS: ACHING

## 2024-05-19 ASSESSMENT — PAIN DESCRIPTION - ORIENTATION
ORIENTATION: LEFT
ORIENTATION: LEFT

## 2024-05-19 NOTE — ANESTHESIA PRE PROCEDURE
Department of Anesthesiology  Preprocedure Note       Name:  Nilay Whittaker   Age:  53 y.o.  :  1970                                          MRN:  [id number]         Date:  2024      Surgeon: Surgeon(s):  Garrett Sanchez MD    Procedure: Procedure(s):  LEFT FOOT DEBRIDEMENT INCISION AND DRAINAGE, POSSIBLE LEFT FIFTH TOE AMPUTATION    Medications prior to admission:   Prior to Admission medications    Medication Sig Start Date End Date Taking? Authorizing Provider   Tirzepatide (MOUNJARO) 2.5 MG/0.5ML SOPN SC injection Inject 0.5 mLs into the skin once a week Last dose 24    Vi Bolanos MD   metoprolol succinate (TOPROL XL) 50 MG extended release tablet Take 1 tablet by mouth nightly    Vi Bolanos MD   naproxen (NAPROSYN) 500 MG tablet Take 1 tablet by mouth 2 times daily (with meals)    Vi Bolanos MD   metFORMIN (GLUCOPHAGE) 1000 MG tablet Take 1 tablet by mouth 2 times daily (with meals)    Vi Bolanos MD   omeprazole (PRILOSEC) 20 MG delayed release capsule Take 2 capsules by mouth nightly    Vi Bolanos MD   amLODIPine (NORVASC) 10 MG tablet Take 1 tablet by mouth nightly    Vi Bolanos MD   lisinopril (PRINIVIL;ZESTRIL) 40 MG tablet Take 1 tablet by mouth nightly    Vi Bolanos MD   glipiZIDE (GLUCOTROL) 10 MG tablet Take 1 tablet by mouth 2 times daily (before meals)    Vi Bolanos MD   cetirizine (ZYRTEC) 10 MG tablet Take 1 tablet by mouth at bedtime    Vi Bolanos MD   empagliflozin (JARDIANCE) 25 MG tablet Take 1 tablet by mouth at bedtime    Vi Bolanos MD   montelukast (SINGULAIR) 10 MG tablet Take 1 tablet by mouth nightly    Vi Bolanos MD   rosuvastatin (CRESTOR) 40 MG tablet Take 1 tablet by mouth every evening    Vi Bolanos MD   pregabalin (LYRICA) 300 MG capsule Take 1 capsule by mouth 2 times daily.    Vi Bolanos MD   aspirin 81 MG 
[facility] [place],            Allergies:  No Known Allergies    Problem List:    Patient Active Problem List   Diagnosis Code    HTN (hypertension) I10    Status post total knee replacement Z96.659    HLD (hyperlipidemia) E78.5    Cellulitis L03.90       Past Medical History:        Diagnosis Date    Acid reflux     daily medication    Diabetes (HCC)     Mounjaro (last dose 4/21/24) and oral medication- avg fasting 140-150- no hypo sx- last A1C 8.4 per pt    H/O heart artery stent     3 stents in 2020    History of MI (myocardial infarction) 2020    Hypertension     managed with medication    Smoker        Past Surgical History:        Procedure Laterality Date    APPENDECTOMY      CHOLECYSTECTOMY      CORONARY STENT PLACEMENT      FOOT SURGERY Left 4/30/2024    LEFT FOOT 5TH METATARSAL  HEAD EXCISION performed by Laz Fierro MD at [facility]    NOSE SURGERY      TOTAL KNEE ARTHROPLASTY Bilateral        Social History:    Social History     Tobacco Use    Smoking status: Every Day     Current packs/day: 1.50     Types: Cigarettes    Smokeless tobacco: Never   Substance Use Topics    Alcohol use: Not Currently                                Ready to quit: Not Answered  Counseling given: Not Answered      Vital Signs (Current):   Vitals:    05/18/24 0350 05/18/24 0604 05/18/24 0706 05/18/24 1550   BP: 126/63  130/78 135/82   Pulse: 58  60 69   Resp: 18 17 18 16   Temp: 97.3 °F (36.3 °C)  98.6 °F (37 °C) 98.8 °F (37.1 °C)   TempSrc: Oral  Oral Oral   SpO2: 90%  96% 93%   Weight:       Height:                                                  BP Readings from Last 3 Encounters:   05/18/24 135/82   04/30/24 (!) 143/75       NPO Status:                                                                                 BMI:   Wt Readings from Last 3 Encounters:   05/17/24 (!) 160.1 kg (353 lb)   04/30/24 (!) 159.7 kg (352 lb)     Body mass index is 38.78 kg/m².    CBC:   Lab Results   Component Value Date/Time

## 2024-05-20 LAB
ANION GAP SERPL CALC-SCNC: 11 MMOL/L (ref 9–18)
BASOPHILS # BLD: 0.1 K/UL (ref 0–0.2)
BASOPHILS NFR BLD: 1 % (ref 0–2)
BUN SERPL-MCNC: 13 MG/DL (ref 6–23)
CALCIUM SERPL-MCNC: 8.9 MG/DL (ref 8.8–10.2)
CHLORIDE SERPL-SCNC: 106 MMOL/L (ref 98–107)
CO2 SERPL-SCNC: 23 MMOL/L (ref 20–28)
CREAT SERPL-MCNC: 0.71 MG/DL (ref 0.8–1.3)
DIFFERENTIAL METHOD BLD: NORMAL
EOSINOPHIL # BLD: 0.2 K/UL (ref 0–0.8)
EOSINOPHIL NFR BLD: 3 % (ref 0.5–7.8)
ERYTHROCYTE [DISTWIDTH] IN BLOOD BY AUTOMATED COUNT: 14 % (ref 11.9–14.6)
GLUCOSE BLD STRIP.AUTO-MCNC: 118 MG/DL (ref 65–100)
GLUCOSE BLD STRIP.AUTO-MCNC: 120 MG/DL (ref 65–100)
GLUCOSE BLD STRIP.AUTO-MCNC: 144 MG/DL (ref 65–100)
GLUCOSE BLD STRIP.AUTO-MCNC: 147 MG/DL (ref 65–100)
GLUCOSE BLD STRIP.AUTO-MCNC: 153 MG/DL (ref 65–100)
GLUCOSE BLD STRIP.AUTO-MCNC: 245 MG/DL (ref 65–100)
GLUCOSE SERPL-MCNC: 143 MG/DL (ref 70–99)
HCT VFR BLD AUTO: 44.9 % (ref 41.1–50.3)
HGB BLD-MCNC: 14.2 G/DL (ref 13.6–17.2)
IMM GRANULOCYTES # BLD AUTO: 0.1 K/UL (ref 0–0.5)
IMM GRANULOCYTES NFR BLD AUTO: 2 % (ref 0–5)
LYMPHOCYTES # BLD: 3.1 K/UL (ref 0.5–4.6)
LYMPHOCYTES NFR BLD: 37 % (ref 13–44)
MCH RBC QN AUTO: 27.8 PG (ref 26.1–32.9)
MCHC RBC AUTO-ENTMCNC: 31.6 G/DL (ref 31.4–35)
MCV RBC AUTO: 88 FL (ref 82–102)
MONOCYTES # BLD: 0.7 K/UL (ref 0.1–1.3)
MONOCYTES NFR BLD: 9 % (ref 4–12)
NEUTS SEG # BLD: 4.2 K/UL (ref 1.7–8.2)
NEUTS SEG NFR BLD: 48 % (ref 43–78)
NRBC # BLD: 0 K/UL (ref 0–0.2)
PLATELET # BLD AUTO: 254 K/UL (ref 150–450)
PMV BLD AUTO: 9.8 FL (ref 9.4–12.3)
POTASSIUM SERPL-SCNC: 4.2 MMOL/L (ref 3.5–5.1)
RBC # BLD AUTO: 5.1 M/UL (ref 4.23–5.6)
SERVICE CMNT-IMP: ABNORMAL
SODIUM SERPL-SCNC: 139 MMOL/L (ref 136–145)
WBC # BLD AUTO: 8.4 K/UL (ref 4.3–11.1)

## 2024-05-20 PROCEDURE — 7100000001 HC PACU RECOVERY - ADDTL 15 MIN: Performed by: ORTHOPAEDIC SURGERY

## 2024-05-20 PROCEDURE — 80048 BASIC METABOLIC PNL TOTAL CA: CPT

## 2024-05-20 PROCEDURE — 82962 GLUCOSE BLOOD TEST: CPT

## 2024-05-20 PROCEDURE — 3700000000 HC ANESTHESIA ATTENDED CARE: Performed by: ORTHOPAEDIC SURGERY

## 2024-05-20 PROCEDURE — 2709999900 HC NON-CHARGEABLE SUPPLY: Performed by: ORTHOPAEDIC SURGERY

## 2024-05-20 PROCEDURE — 6360000002 HC RX W HCPCS: Performed by: ANESTHESIOLOGY

## 2024-05-20 PROCEDURE — 85025 COMPLETE CBC W/AUTO DIFF WBC: CPT

## 2024-05-20 PROCEDURE — 64447 NJX AA&/STRD FEMORAL NRV IMG: CPT | Performed by: ANESTHESIOLOGY

## 2024-05-20 PROCEDURE — 2500000003 HC RX 250 WO HCPCS: Performed by: REGISTERED NURSE

## 2024-05-20 PROCEDURE — 88305 TISSUE EXAM BY PATHOLOGIST: CPT

## 2024-05-20 PROCEDURE — 1100000000 HC RM PRIVATE

## 2024-05-20 PROCEDURE — 36415 COLL VENOUS BLD VENIPUNCTURE: CPT

## 2024-05-20 PROCEDURE — 3700000001 HC ADD 15 MINUTES (ANESTHESIA): Performed by: ORTHOPAEDIC SURGERY

## 2024-05-20 PROCEDURE — 7100000000 HC PACU RECOVERY - FIRST 15 MIN: Performed by: ORTHOPAEDIC SURGERY

## 2024-05-20 PROCEDURE — 64445 NJX AA&/STRD SCIATIC NRV IMG: CPT | Performed by: ANESTHESIOLOGY

## 2024-05-20 PROCEDURE — 0Y6Y0Z0 DETACHMENT AT LEFT 5TH TOE, COMPLETE, OPEN APPROACH: ICD-10-PCS | Performed by: ORTHOPAEDIC SURGERY

## 2024-05-20 PROCEDURE — 2580000003 HC RX 258: Performed by: REGISTERED NURSE

## 2024-05-20 PROCEDURE — 6360000002 HC RX W HCPCS: Performed by: INTERNAL MEDICINE

## 2024-05-20 PROCEDURE — 3600000014 HC SURGERY LEVEL 4 ADDTL 15MIN: Performed by: ORTHOPAEDIC SURGERY

## 2024-05-20 PROCEDURE — 2580000003 HC RX 258: Performed by: ORTHOPAEDIC SURGERY

## 2024-05-20 PROCEDURE — 6360000002 HC RX W HCPCS: Performed by: REGISTERED NURSE

## 2024-05-20 PROCEDURE — 6360000002 HC RX W HCPCS: Performed by: STUDENT IN AN ORGANIZED HEALTH CARE EDUCATION/TRAINING PROGRAM

## 2024-05-20 PROCEDURE — 6360000002 HC RX W HCPCS: Performed by: ORTHOPAEDIC SURGERY

## 2024-05-20 PROCEDURE — 2580000003 HC RX 258: Performed by: INTERNAL MEDICINE

## 2024-05-20 PROCEDURE — 3600000004 HC SURGERY LEVEL 4 BASE: Performed by: ORTHOPAEDIC SURGERY

## 2024-05-20 PROCEDURE — 0QBP0ZZ EXCISION OF LEFT METATARSAL, OPEN APPROACH: ICD-10-PCS | Performed by: ORTHOPAEDIC SURGERY

## 2024-05-20 PROCEDURE — 6370000000 HC RX 637 (ALT 250 FOR IP): Performed by: INTERNAL MEDICINE

## 2024-05-20 RX ORDER — ROPIVACAINE HYDROCHLORIDE 5 MG/ML
INJECTION, SOLUTION EPIDURAL; INFILTRATION; PERINEURAL
Status: COMPLETED | OUTPATIENT
Start: 2024-05-20 | End: 2024-05-20

## 2024-05-20 RX ORDER — PROCHLORPERAZINE EDISYLATE 5 MG/ML
5 INJECTION INTRAMUSCULAR; INTRAVENOUS
Status: DISCONTINUED | OUTPATIENT
Start: 2024-05-20 | End: 2024-05-20 | Stop reason: HOSPADM

## 2024-05-20 RX ORDER — FENTANYL CITRATE 50 UG/ML
100 INJECTION, SOLUTION INTRAMUSCULAR; INTRAVENOUS
Status: COMPLETED | OUTPATIENT
Start: 2024-05-20 | End: 2024-05-20

## 2024-05-20 RX ORDER — LIDOCAINE HYDROCHLORIDE 20 MG/ML
INJECTION, SOLUTION EPIDURAL; INFILTRATION; INTRACAUDAL; PERINEURAL PRN
Status: DISCONTINUED | OUTPATIENT
Start: 2024-05-20 | End: 2024-05-20 | Stop reason: SDUPTHER

## 2024-05-20 RX ORDER — HYDROMORPHONE HYDROCHLORIDE 2 MG/ML
0.5 INJECTION, SOLUTION INTRAMUSCULAR; INTRAVENOUS; SUBCUTANEOUS EVERY 5 MIN PRN
Status: DISCONTINUED | OUTPATIENT
Start: 2024-05-20 | End: 2024-05-20 | Stop reason: HOSPADM

## 2024-05-20 RX ORDER — MIDAZOLAM HYDROCHLORIDE 2 MG/2ML
2 INJECTION, SOLUTION INTRAMUSCULAR; INTRAVENOUS
Status: COMPLETED | OUTPATIENT
Start: 2024-05-20 | End: 2024-05-20

## 2024-05-20 RX ORDER — SODIUM CHLORIDE 0.9 % (FLUSH) 0.9 %
5-40 SYRINGE (ML) INJECTION EVERY 12 HOURS SCHEDULED
Status: DISCONTINUED | OUTPATIENT
Start: 2024-05-20 | End: 2024-05-20 | Stop reason: HOSPADM

## 2024-05-20 RX ORDER — SODIUM CHLORIDE, SODIUM LACTATE, POTASSIUM CHLORIDE, CALCIUM CHLORIDE 600; 310; 30; 20 MG/100ML; MG/100ML; MG/100ML; MG/100ML
INJECTION, SOLUTION INTRAVENOUS CONTINUOUS
Status: DISCONTINUED | OUTPATIENT
Start: 2024-05-20 | End: 2024-05-20 | Stop reason: HOSPADM

## 2024-05-20 RX ORDER — SODIUM CHLORIDE, SODIUM LACTATE, POTASSIUM CHLORIDE, CALCIUM CHLORIDE 600; 310; 30; 20 MG/100ML; MG/100ML; MG/100ML; MG/100ML
INJECTION, SOLUTION INTRAVENOUS CONTINUOUS PRN
Status: DISCONTINUED | OUTPATIENT
Start: 2024-05-20 | End: 2024-05-20 | Stop reason: SDUPTHER

## 2024-05-20 RX ORDER — NALOXONE HYDROCHLORIDE 0.4 MG/ML
INJECTION, SOLUTION INTRAMUSCULAR; INTRAVENOUS; SUBCUTANEOUS PRN
Status: DISCONTINUED | OUTPATIENT
Start: 2024-05-20 | End: 2024-05-20 | Stop reason: HOSPADM

## 2024-05-20 RX ORDER — SODIUM CHLORIDE 0.9 % (FLUSH) 0.9 %
5-40 SYRINGE (ML) INJECTION EVERY 12 HOURS SCHEDULED
Status: DISCONTINUED | OUTPATIENT
Start: 2024-05-20 | End: 2024-05-20

## 2024-05-20 RX ORDER — OXYCODONE HYDROCHLORIDE 5 MG/1
5 TABLET ORAL
Status: DISCONTINUED | OUTPATIENT
Start: 2024-05-20 | End: 2024-05-20 | Stop reason: HOSPADM

## 2024-05-20 RX ORDER — PROPOFOL 10 MG/ML
INJECTION, EMULSION INTRAVENOUS PRN
Status: DISCONTINUED | OUTPATIENT
Start: 2024-05-20 | End: 2024-05-20 | Stop reason: SDUPTHER

## 2024-05-20 RX ORDER — SODIUM CHLORIDE 9 MG/ML
INJECTION, SOLUTION INTRAVENOUS PRN
Status: DISCONTINUED | OUTPATIENT
Start: 2024-05-20 | End: 2024-05-20 | Stop reason: HOSPADM

## 2024-05-20 RX ORDER — SODIUM CHLORIDE 0.9 % (FLUSH) 0.9 %
5-40 SYRINGE (ML) INJECTION PRN
Status: DISCONTINUED | OUTPATIENT
Start: 2024-05-20 | End: 2024-05-20

## 2024-05-20 RX ORDER — LIDOCAINE HYDROCHLORIDE 10 MG/ML
1 INJECTION, SOLUTION INFILTRATION; PERINEURAL
Status: DISCONTINUED | OUTPATIENT
Start: 2024-05-20 | End: 2024-05-20

## 2024-05-20 RX ORDER — ONDANSETRON 2 MG/ML
4 INJECTION INTRAMUSCULAR; INTRAVENOUS
Status: DISCONTINUED | OUTPATIENT
Start: 2024-05-20 | End: 2024-05-20 | Stop reason: HOSPADM

## 2024-05-20 RX ORDER — ACETAMINOPHEN 500 MG
1000 TABLET ORAL ONCE
Status: DISCONTINUED | OUTPATIENT
Start: 2024-05-20 | End: 2024-05-20 | Stop reason: HOSPADM

## 2024-05-20 RX ORDER — SODIUM CHLORIDE 9 MG/ML
INJECTION, SOLUTION INTRAVENOUS PRN
Status: DISCONTINUED | OUTPATIENT
Start: 2024-05-20 | End: 2024-05-20

## 2024-05-20 RX ORDER — LIDOCAINE HYDROCHLORIDE 10 MG/ML
1 INJECTION, SOLUTION INFILTRATION; PERINEURAL
Status: DISCONTINUED | OUTPATIENT
Start: 2024-05-20 | End: 2024-05-20 | Stop reason: HOSPADM

## 2024-05-20 RX ORDER — SODIUM CHLORIDE 0.9 % (FLUSH) 0.9 %
5-40 SYRINGE (ML) INJECTION PRN
Status: DISCONTINUED | OUTPATIENT
Start: 2024-05-20 | End: 2024-05-20 | Stop reason: HOSPADM

## 2024-05-20 RX ADMIN — PREGABALIN 300 MG: 100 CAPSULE ORAL at 07:40

## 2024-05-20 RX ADMIN — ROSUVASTATIN CALCIUM 40 MG: 10 TABLET, FILM COATED ORAL at 16:45

## 2024-05-20 RX ADMIN — PREGABALIN 300 MG: 100 CAPSULE ORAL at 20:51

## 2024-05-20 RX ADMIN — SODIUM CHLORIDE, PRESERVATIVE FREE 10 ML: 5 INJECTION INTRAVENOUS at 20:52

## 2024-05-20 RX ADMIN — LIDOCAINE HYDROCHLORIDE 100 MG: 20 INJECTION, SOLUTION EPIDURAL; INFILTRATION; INTRACAUDAL; PERINEURAL at 13:50

## 2024-05-20 RX ADMIN — ASPIRIN 81 MG 81 MG: 81 TABLET ORAL at 20:50

## 2024-05-20 RX ADMIN — METOPROLOL SUCCINATE 50 MG: 50 TABLET, EXTENDED RELEASE ORAL at 20:51

## 2024-05-20 RX ADMIN — ROPIVACAINE HYDROCHLORIDE 20 ML: 5 INJECTION, SOLUTION EPIDURAL; INFILTRATION; PERINEURAL at 13:11

## 2024-05-20 RX ADMIN — PIPERACILLIN AND TAZOBACTAM 3375 MG: 3; .375 INJECTION, POWDER, LYOPHILIZED, FOR SOLUTION INTRAVENOUS at 16:11

## 2024-05-20 RX ADMIN — PROPOFOL 100 MG: 10 INJECTION, EMULSION INTRAVENOUS at 13:50

## 2024-05-20 RX ADMIN — ROPIVACAINE HYDROCHLORIDE 30 ML: 5 INJECTION, SOLUTION EPIDURAL; INFILTRATION; PERINEURAL at 13:02

## 2024-05-20 RX ADMIN — PROPOFOL 125 MCG/KG/MIN: 10 INJECTION, EMULSION INTRAVENOUS at 13:51

## 2024-05-20 RX ADMIN — MONTELUKAST 10 MG: 10 TABLET, FILM COATED ORAL at 20:51

## 2024-05-20 RX ADMIN — ROPIVACAINE HYDROCHLORIDE 750 ML: 5 INJECTION, SOLUTION EPIDURAL; INFILTRATION; PERINEURAL at 14:22

## 2024-05-20 RX ADMIN — SODIUM CHLORIDE, SODIUM LACTATE, POTASSIUM CHLORIDE, AND CALCIUM CHLORIDE: 600; 310; 30; 20 INJECTION, SOLUTION INTRAVENOUS at 13:40

## 2024-05-20 RX ADMIN — LISINOPRIL 40 MG: 20 TABLET ORAL at 20:50

## 2024-05-20 RX ADMIN — AMLODIPINE BESYLATE 10 MG: 10 TABLET ORAL at 20:50

## 2024-05-20 RX ADMIN — GLIPIZIDE 10 MG: 5 TABLET ORAL at 07:40

## 2024-05-20 RX ADMIN — SODIUM CHLORIDE: 9 INJECTION, SOLUTION INTRAVENOUS at 16:09

## 2024-05-20 RX ADMIN — PIPERACILLIN AND TAZOBACTAM 3375 MG: 3; .375 INJECTION, POWDER, LYOPHILIZED, FOR SOLUTION INTRAVENOUS at 03:28

## 2024-05-20 RX ADMIN — PIPERACILLIN AND TAZOBACTAM 3375 MG: 3; .375 INJECTION, POWDER, LYOPHILIZED, FOR SOLUTION INTRAVENOUS at 07:40

## 2024-05-20 RX ADMIN — VANCOMYCIN HYDROCHLORIDE 2000 MG: 10 INJECTION, POWDER, LYOPHILIZED, FOR SOLUTION INTRAVENOUS at 04:15

## 2024-05-20 RX ADMIN — SODIUM CHLORIDE, PRESERVATIVE FREE 10 ML: 5 INJECTION INTRAVENOUS at 00:00

## 2024-05-20 RX ADMIN — MIDAZOLAM 2 MG: 1 INJECTION INTRAMUSCULAR; INTRAVENOUS at 13:05

## 2024-05-20 RX ADMIN — MORPHINE SULFATE 2 MG: 2 INJECTION, SOLUTION INTRAMUSCULAR; INTRAVENOUS at 04:20

## 2024-05-20 RX ADMIN — SODIUM CHLORIDE: 9 INJECTION, SOLUTION INTRAVENOUS at 07:39

## 2024-05-20 RX ADMIN — FENTANYL CITRATE 100 MCG: 50 INJECTION INTRAMUSCULAR; INTRAVENOUS at 13:05

## 2024-05-20 RX ADMIN — VANCOMYCIN HYDROCHLORIDE 2000 MG: 10 INJECTION, POWDER, LYOPHILIZED, FOR SOLUTION INTRAVENOUS at 16:12

## 2024-05-20 RX ADMIN — PANTOPRAZOLE SODIUM 40 MG: 40 TABLET, DELAYED RELEASE ORAL at 04:16

## 2024-05-20 RX ADMIN — CETIRIZINE HYDROCHLORIDE 10 MG: 10 TABLET, FILM COATED ORAL at 20:50

## 2024-05-20 RX ADMIN — AMLODIPINE BESYLATE 10 MG: 10 TABLET ORAL at 00:00

## 2024-05-20 ASSESSMENT — PAIN DESCRIPTION - LOCATION
LOCATION: FOOT
LOCATION: FOOT

## 2024-05-20 ASSESSMENT — PAIN SCALES - GENERAL
PAINLEVEL_OUTOF10: 5
PAINLEVEL_OUTOF10: 0
PAINLEVEL_OUTOF10: 10
PAINLEVEL_OUTOF10: 0

## 2024-05-20 ASSESSMENT — PAIN DESCRIPTION - ORIENTATION: ORIENTATION: LEFT

## 2024-05-20 ASSESSMENT — PAIN - FUNCTIONAL ASSESSMENT
PAIN_FUNCTIONAL_ASSESSMENT: NONE - DENIES PAIN
PAIN_FUNCTIONAL_ASSESSMENT: 0-10
PAIN_FUNCTIONAL_ASSESSMENT: NONE - DENIES PAIN
PAIN_FUNCTIONAL_ASSESSMENT: NONE - DENIES PAIN

## 2024-05-20 NOTE — ANESTHESIA PROCEDURE NOTES
Peripheral Block    Patient location during procedure: pre-op  Reason for block: post-op pain management and at surgeon's request  Start time: 5/20/2024 1:02 PM  End time: 5/20/2024 1:10 PM  Staffing  Performed: anesthesiologist   Anesthesiologist: Gm Corona MD  Performed by: Gm Corona MD  Authorized by: Gm Corona MD    Preanesthetic Checklist  Completed: patient identified, IV checked, site marked, risks and benefits discussed, surgical/procedural consents, equipment checked, pre-op evaluation, timeout performed, anesthesia consent given, oxygen available and monitors applied/VS acknowledged  Peripheral Block   Patient position: supine  Prep: ChloraPrep  Provider prep: mask and sterile gloves  Patient monitoring: cardiac monitor, continuous pulse ox, oxygen, IV access, frequent blood pressure checks and responsive to questions  Block type: Sciatic  Popliteal  Laterality: left  Injection technique: single-shot  Guidance: nerve stimulator and ultrasound guided    Needle   Needle type: insulated echogenic nerve stimulator needle   Needle gauge: 20 G  Needle localization: nerve stimulator and ultrasound guidance (minimal motor response at >0.4 mA)  Needle length: 10 cm  Assessment   Injection assessment: negative aspiration for heme, no paresthesia on injection, local visualized surrounding nerve on ultrasound and no intravascular symptoms  Slow fractionated injection: yes  Hemodynamics: stable  Outcomes: patient tolerated procedure well    Additional Notes  Risks/benefits/alternatives discussed including damage to nerve or muscle.    Needle inserted and placed in close proximity to the nerve under real time ultrasound guidance.  Ultrasound was used to visualize the spread of local anesthetic in increments of 2cc to 5cc in close proximity to the nerve being blocked.  Patient tolerated procedure well with no immediate complications.  Image saved to chart    Decadron 4mg added to local

## 2024-05-20 NOTE — ANESTHESIA POSTPROCEDURE EVALUATION
Department of Anesthesiology  Postprocedure Note    Patient: Nilay Whittaker  MRN: [id number]  YOB: 1970  Date of evaluation: 5/20/2024    Procedure Summary       Date: 05/20/24 Room / Location: [facility] MAIN OR 08 / [facility] MAIN OR    Anesthesia Start: 1340 Anesthesia Stop:     Procedure: LEFT FOOT DEBRIDEMENT INCISION AND DRAINAGE, POSSIBLE LEFT FIFTH TOE AMPUTATION (Left) Diagnosis:       Abscess of right hand      (Abscess of right hand [L02.511])    Providers: Garrett Sanchez MD Responsible Provider: Gm Corona MD    Anesthesia Type: TIVA ASA Status: 3            Anesthesia Type: TIVA    Galo Phase I: Galo Score: 10    Galo Phase II:      Anesthesia Post Evaluation    Patient location during evaluation: PACU  Patient participation: complete - patient participated  Level of consciousness: awake and alert  Airway patency: patent  Nausea & Vomiting: no nausea and no vomiting  Cardiovascular status: hemodynamically stable  Respiratory status: acceptable, nonlabored ventilation and spontaneous ventilation  Hydration status: euvolemic  Comments: BP (!) 140/73   Pulse 62   Temp 97.7 °F (36.5 °C) (Temporal)   Resp 16   Ht 2.032 m (6' 8\")   Wt (!) 160.1 kg (353 lb)   SpO2 91%   BMI 38.78 kg/m²     Multimodal analgesia pain management approach  Pain management: adequate and satisfactory to patient    No notable events documented.

## 2024-05-20 NOTE — OP NOTE
Operative Report    Patient: Nilay Whittaker MRN: [id number]  SSN: [id number]    YOB: 1970  Age: 53 y.o.  Sex: male       Date of Surgery: May 20, 2024     History:  Nilay Whittaker is a 53 y.o. male who had a partial resection of his fifth metatarsal for a ulcer on the bottom of his foot so he has with this plantar ulcer that one of the podiatrist operated on and resected a portion of his fifth metatarsal.  He had a diagnosis of osteomyelitis but he came in because he was having some increasing redness and drainage around this.  Is about 4 weeks postop.  I talked him about the fact that I did seem like he had a diabetic foot ulcer and he probably had some infection there all along this been sort of partially treated with just oral antibiotics.  I did think that the most reasonable thing would be to repeat his debridement and then either try to salvage his small toe or if we thought it was not salvageable to amputate his fifth toe as well.  He seemed to be okay with the fact that we thought was necessary to go ahead and amputate his left fifth toe so he was consented for this as well..      I talked to the patient and/or their representative and explained the exact nature the procedure.  I also went through a detailed list of the material risks associated with  the procedure which included risk of bleeding, infection, injury to nearby structures, worsening the situation, as well as the risks associate with anesthesia and finally death.  Also talked with him regarding the benefits and alternatives to the procedure.    Preoperative Diagnosis: Left diabetic foot infection    Postoperative Diagnosis:   Left diabetic foot infection      Surgeon(s) and Role:     * Garrett Sanchez MD - Primary    Anesthesia: Choice     Procedure: Repeat excisional debridement debridement of left foot wound with excision of skin subcutaneous tissue and bone measuring less than 20 cm² and amputation of left

## 2024-05-20 NOTE — ANESTHESIA PROCEDURE NOTES
Peripheral Block    Patient location during procedure: pre-op  Reason for block: post-op pain management and at surgeon's request  Start time: 5/20/2024 1:11 PM  End time: 5/20/2024 1:14 PM  Staffing  Performed: anesthesiologist   Anesthesiologist: Gm Corona MD  Performed by: Gm Corona MD  Authorized by: Gm Corona MD    Preanesthetic Checklist  Completed: patient identified, IV checked, site marked, risks and benefits discussed, surgical/procedural consents, equipment checked, pre-op evaluation, timeout performed, anesthesia consent given, oxygen available and monitors applied/VS acknowledged  Peripheral Block   Patient position: supine  Prep: ChloraPrep  Provider prep: mask and sterile gloves  Patient monitoring: cardiac monitor, continuous pulse ox, oxygen, IV access, frequent blood pressure checks and responsive to questions  Block type: Femoral  Adductor canal  Laterality: left  Injection technique: single-shot  Guidance: nerve stimulator and ultrasound guided    Needle   Needle type: insulated echogenic nerve stimulator needle   Needle gauge: 20 G  Needle localization: nerve stimulator and ultrasound guidance (minimal motor response at >0.4 mA)  Needle length: 10 cm  Assessment   Injection assessment: negative aspiration for heme, no paresthesia on injection, local visualized surrounding nerve on ultrasound and no intravascular symptoms  Slow fractionated injection: yes  Hemodynamics: stable  Outcomes: patient tolerated procedure well    Additional Notes  Risks/benefits/alternatives discussed including damage to nerve or muscle.    Needle inserted and placed in close proximity to the nerve under real time ultrasound guidance.  Ultrasound was used to visualize the spread of local anesthetic in increments of 2cc to 5cc in close proximity to the nerve being blocked.  Patient tolerated procedure well with no immediate complications.  Image saved to chart    Decadron 4mg added to

## 2024-05-20 NOTE — INTERVAL H&P NOTE
Update History & Physical    The Patient's History and Physical of 5/17/2024 was reviewed with the patient and I examined the patient.  There was no change.  The surgical site was confirmed by the patient and me.    Plan:  The risk, benefits, expected outcome, and alternative to the recommended procedure have been discussed with the patient.  Patient understands and wants to proceed with debridement of left foot wound and possible left small toe amputation.    Electronically signed by Garrett Sanchez MD on 5/20/2024 at 7:21 AM

## 2024-05-20 NOTE — CARE COORDINATION
05/20/24 1215   Service Assessment   Patient Orientation Alert and Oriented   Cognition Alert   History Provided By Patient   Primary Caregiver Self   Support Systems Spouse/Significant Other   Patient's Healthcare Decision Maker is: Legal Next of Kin   PCP Verified by CM Yes   Last Visit to PCP Within last 6 months   Prior Functional Level Independent in ADLs/IADLs   Current Functional Level Independent in ADLs/IADLs   Can patient return to prior living arrangement Yes   Ability to make needs known: Good   Would you like for me to discuss the discharge plan with any other family members/significant others, and if so, who? No   Financial Resources Other (Comment)  (commerical)   Community Resources None   Condition of Participation: Discharge Planning   The Plan for Transition of Care is related to the following treatment goals: return home   The Patient and/or Patient Representative was provided with a Choice of Provider? Patient   The Patient and/Or Patient Representative agree with the Discharge Plan? Yes   Freedom of Choice list was provided with basic dialogue that supports the patient's individualized plan of care/goals, treatment preferences, and shares the quality data associated with the providers?  Yes     Assessment completed with patient in room. Patient lives with spouse. He is IND with all adls. Demographics and PCP confirmed. Discharge plan is to return home, no anticipated discharge needs.    Lucinda LYN, ACM  [place]

## 2024-05-20 NOTE — PERIOP NOTE
TRANSFER - OUT REPORT:    Verbal report given to Lolly on Nilay Whittaker  being transferred to room 623  for routine progression of patient care       Report consisted of patient's Situation, Background, Assessment and   Recommendations(SBAR).     Information from the following report(s) Nurse Handoff Report was reviewed with the receiving nurse.           Lines:   Peripheral IV 05/17/24 Right Antecubital (Active)   Site Assessment Clean, dry & intact 05/20/24 1435   Line Status Infusing;Flushed 05/20/24 1435   Line Care Connections checked and tightened 05/20/24 1435   Phlebitis Assessment No symptoms 05/20/24 1435   Infiltration Assessment 0 05/20/24 1435   Alcohol Cap Used Yes 05/19/24 1518   Dressing Status Clean, dry & intact 05/20/24 1435   Dressing Type Transparent 05/20/24 1435       Peripheral IV 05/18/24 Left;Anterior;Ventral Forearm (Active)   Site Assessment Clean, dry & intact 05/19/24 1518   Line Status Capped;Normal saline locked 05/19/24 1518   Line Care Connections checked and tightened 05/19/24 1518   Phlebitis Assessment No symptoms 05/19/24 1518   Infiltration Assessment 0 05/19/24 1518   Alcohol Cap Used Yes 05/19/24 1518   Dressing Status Clean, dry & intact 05/19/24 1518   Dressing Type Transparent 05/19/24 1518        Opportunity for questions and clarification was provided.      Patient transported with:  Tech

## 2024-05-21 VITALS
WEIGHT: 315 LBS | TEMPERATURE: 98.1 F | HEART RATE: 65 BPM | RESPIRATION RATE: 18 BRPM | BODY MASS INDEX: 36.45 KG/M2 | SYSTOLIC BLOOD PRESSURE: 141 MMHG | OXYGEN SATURATION: 92 % | HEIGHT: 78 IN | DIASTOLIC BLOOD PRESSURE: 70 MMHG

## 2024-05-21 PROBLEM — S98.132A AMPUTATION OF FIFTH TOE OF LEFT FOOT (HCC): Status: ACTIVE | Noted: 2024-05-21

## 2024-05-21 PROBLEM — M86.9 OSTEOMYELITIS OF FIFTH TOE OF LEFT FOOT (HCC): Status: ACTIVE | Noted: 2024-05-21

## 2024-05-21 LAB
ANION GAP SERPL CALC-SCNC: 12 MMOL/L (ref 9–18)
BACTERIA SPEC CULT: ABNORMAL
BACTERIA SPEC CULT: ABNORMAL
BASOPHILS # BLD: 0.1 K/UL (ref 0–0.2)
BASOPHILS NFR BLD: 1 % (ref 0–2)
BUN SERPL-MCNC: 13 MG/DL (ref 6–23)
CALCIUM SERPL-MCNC: 8.8 MG/DL (ref 8.8–10.2)
CHLORIDE SERPL-SCNC: 105 MMOL/L (ref 98–107)
CO2 SERPL-SCNC: 21 MMOL/L (ref 20–28)
CREAT SERPL-MCNC: 0.78 MG/DL (ref 0.8–1.3)
DIFFERENTIAL METHOD BLD: ABNORMAL
EOSINOPHIL # BLD: 0.2 K/UL (ref 0–0.8)
EOSINOPHIL NFR BLD: 2 % (ref 0.5–7.8)
ERYTHROCYTE [DISTWIDTH] IN BLOOD BY AUTOMATED COUNT: 14.1 % (ref 11.9–14.6)
GLUCOSE BLD STRIP.AUTO-MCNC: 199 MG/DL (ref 65–100)
GLUCOSE BLD STRIP.AUTO-MCNC: 221 MG/DL (ref 65–100)
GLUCOSE SERPL-MCNC: 204 MG/DL (ref 70–99)
GRAM STN SPEC: ABNORMAL
GRAM STN SPEC: ABNORMAL
HCT VFR BLD AUTO: 42.9 % (ref 41.1–50.3)
HGB BLD-MCNC: 13.7 G/DL (ref 13.6–17.2)
IMM GRANULOCYTES # BLD AUTO: 0.1 K/UL (ref 0–0.5)
IMM GRANULOCYTES NFR BLD AUTO: 1 % (ref 0–5)
LYMPHOCYTES # BLD: 2.4 K/UL (ref 0.5–4.6)
LYMPHOCYTES NFR BLD: 19 % (ref 13–44)
MCH RBC QN AUTO: 28.1 PG (ref 26.1–32.9)
MCHC RBC AUTO-ENTMCNC: 31.9 G/DL (ref 31.4–35)
MCV RBC AUTO: 87.9 FL (ref 82–102)
MONOCYTES # BLD: 1.3 K/UL (ref 0.1–1.3)
MONOCYTES NFR BLD: 11 % (ref 4–12)
NEUTS SEG # BLD: 8.3 K/UL (ref 1.7–8.2)
NEUTS SEG NFR BLD: 66 % (ref 43–78)
NRBC # BLD: 0 K/UL (ref 0–0.2)
PLATELET # BLD AUTO: 240 K/UL (ref 150–450)
PMV BLD AUTO: 9.8 FL (ref 9.4–12.3)
POTASSIUM SERPL-SCNC: 4.1 MMOL/L (ref 3.5–5.1)
RBC # BLD AUTO: 4.88 M/UL (ref 4.23–5.6)
SERVICE CMNT-IMP: ABNORMAL
SODIUM SERPL-SCNC: 138 MMOL/L (ref 136–145)
WBC # BLD AUTO: 12.4 K/UL (ref 4.3–11.1)

## 2024-05-21 PROCEDURE — 6360000002 HC RX W HCPCS: Performed by: ORTHOPAEDIC SURGERY

## 2024-05-21 PROCEDURE — 80048 BASIC METABOLIC PNL TOTAL CA: CPT

## 2024-05-21 PROCEDURE — 6370000000 HC RX 637 (ALT 250 FOR IP): Performed by: INTERNAL MEDICINE

## 2024-05-21 PROCEDURE — 6360000002 HC RX W HCPCS: Performed by: INTERNAL MEDICINE

## 2024-05-21 PROCEDURE — 2580000003 HC RX 258: Performed by: ORTHOPAEDIC SURGERY

## 2024-05-21 PROCEDURE — 82962 GLUCOSE BLOOD TEST: CPT

## 2024-05-21 PROCEDURE — 2580000003 HC RX 258: Performed by: INTERNAL MEDICINE

## 2024-05-21 PROCEDURE — 85025 COMPLETE CBC W/AUTO DIFF WBC: CPT

## 2024-05-21 PROCEDURE — 6370000000 HC RX 637 (ALT 250 FOR IP): Performed by: HOSPITALIST

## 2024-05-21 PROCEDURE — 97161 PT EVAL LOW COMPLEX 20 MIN: CPT

## 2024-05-21 PROCEDURE — 36415 COLL VENOUS BLD VENIPUNCTURE: CPT

## 2024-05-21 PROCEDURE — 97530 THERAPEUTIC ACTIVITIES: CPT

## 2024-05-21 RX ORDER — ONDANSETRON 2 MG/ML
8 INJECTION INTRAMUSCULAR; INTRAVENOUS
Status: CANCELLED | OUTPATIENT
Start: 2024-05-22

## 2024-05-21 RX ORDER — ALBUTEROL SULFATE 2.5 MG/3ML
2.5 SOLUTION RESPIRATORY (INHALATION)
Status: CANCELLED | OUTPATIENT
Start: 2024-05-22

## 2024-05-21 RX ORDER — SODIUM CHLORIDE 9 MG/ML
5-250 INJECTION, SOLUTION INTRAVENOUS PRN
Status: CANCELLED | OUTPATIENT
Start: 2024-05-22

## 2024-05-21 RX ORDER — SODIUM CHLORIDE 9 MG/ML
INJECTION, SOLUTION INTRAVENOUS CONTINUOUS
Status: CANCELLED | OUTPATIENT
Start: 2024-05-22

## 2024-05-21 RX ORDER — HEPARIN 100 UNIT/ML
500 SYRINGE INTRAVENOUS PRN
Status: CANCELLED | OUTPATIENT
Start: 2024-05-22

## 2024-05-21 RX ORDER — LINEZOLID 600 MG/1
600 TABLET, FILM COATED ORAL EVERY 12 HOURS SCHEDULED
Status: DISCONTINUED | OUTPATIENT
Start: 2024-05-21 | End: 2024-05-21 | Stop reason: HOSPADM

## 2024-05-21 RX ORDER — DIPHENHYDRAMINE HYDROCHLORIDE 50 MG/ML
50 INJECTION INTRAMUSCULAR; INTRAVENOUS
Status: CANCELLED | OUTPATIENT
Start: 2024-05-22

## 2024-05-21 RX ORDER — ALBUTEROL SULFATE 90 UG/1
4 AEROSOL, METERED RESPIRATORY (INHALATION) PRN
Status: CANCELLED | OUTPATIENT
Start: 2024-05-22

## 2024-05-21 RX ORDER — SODIUM CHLORIDE 0.9 % (FLUSH) 0.9 %
5-40 SYRINGE (ML) INJECTION PRN
Status: CANCELLED | OUTPATIENT
Start: 2024-05-22

## 2024-05-21 RX ORDER — ACETAMINOPHEN 325 MG/1
650 TABLET ORAL
Status: CANCELLED | OUTPATIENT
Start: 2024-05-22

## 2024-05-21 RX ORDER — EPINEPHRINE 1 MG/ML
0.3 INJECTION, SOLUTION, CONCENTRATE INTRAVENOUS PRN
Status: CANCELLED | OUTPATIENT
Start: 2024-05-22

## 2024-05-21 RX ORDER — HYDROCODONE BITARTRATE AND ACETAMINOPHEN 5; 325 MG/1; MG/1
1 TABLET ORAL EVERY 6 HOURS PRN
Qty: 28 TABLET | Refills: 0 | Status: SHIPPED | OUTPATIENT
Start: 2024-05-21 | End: 2024-05-28

## 2024-05-21 RX ADMIN — PREGABALIN 300 MG: 100 CAPSULE ORAL at 08:06

## 2024-05-21 RX ADMIN — VANCOMYCIN HYDROCHLORIDE 2000 MG: 10 INJECTION, POWDER, LYOPHILIZED, FOR SOLUTION INTRAVENOUS at 04:55

## 2024-05-21 RX ADMIN — PIPERACILLIN AND TAZOBACTAM 3375 MG: 3; .375 INJECTION, POWDER, LYOPHILIZED, FOR SOLUTION INTRAVENOUS at 00:21

## 2024-05-21 RX ADMIN — LINEZOLID 600 MG: 600 TABLET, FILM COATED ORAL at 11:06

## 2024-05-21 RX ADMIN — SODIUM CHLORIDE: 9 INJECTION, SOLUTION INTRAVENOUS at 08:10

## 2024-05-21 RX ADMIN — PIPERACILLIN AND TAZOBACTAM 3375 MG: 3; .375 INJECTION, POWDER, LYOPHILIZED, FOR SOLUTION INTRAVENOUS at 08:10

## 2024-05-21 RX ADMIN — PANTOPRAZOLE SODIUM 40 MG: 40 TABLET, DELAYED RELEASE ORAL at 04:49

## 2024-05-21 RX ADMIN — GLIPIZIDE 10 MG: 5 TABLET ORAL at 08:06

## 2024-05-21 RX ADMIN — SODIUM CHLORIDE, PRESERVATIVE FREE 10 ML: 5 INJECTION INTRAVENOUS at 08:11

## 2024-05-21 RX ADMIN — MORPHINE SULFATE 2 MG: 2 INJECTION, SOLUTION INTRAMUSCULAR; INTRAVENOUS at 00:25

## 2024-05-21 ASSESSMENT — PAIN SCALES - GENERAL: PAINLEVEL_OUTOF10: 6

## 2024-05-21 ASSESSMENT — PAIN DESCRIPTION - LOCATION: LOCATION: BACK

## 2024-05-21 ASSESSMENT — PAIN DESCRIPTION - DESCRIPTORS: DESCRIPTORS: ACHING;DISCOMFORT

## 2024-05-21 ASSESSMENT — PAIN DESCRIPTION - ORIENTATION: ORIENTATION: LEFT;MID

## 2024-05-21 ASSESSMENT — PAIN - FUNCTIONAL ASSESSMENT: PAIN_FUNCTIONAL_ASSESSMENT: PREVENTS OR INTERFERES SOME ACTIVE ACTIVITIES AND ADLS

## 2024-05-21 NOTE — CONSULTS
Infectious Diseases Consult    Today's Date: 2024   Admit Date: 2024  : 1970    Impression:   Left foot 5th MT head OM, previous cx from MT head resection () with MRSA  S/p ( exc-debridement, 5th toe amputation and 5th MT shaft, no cx-path pending. Superficial cx with MSSA and Enterococcus spp-pending  DM, A1c 8.8  Leukocytosis, resolved    Plan:   Transition to linezolid while he awaits set up at outpatient infusion center for Dalbavancin 1500mg x 2 doses, given 8 days apart using peripheral IV  This will provide 6 weeks targeted therapy  OPAT orders sent  ID follow up in 3-4 weeks  Ok to DC once arrangement confirmed by CM    Anti-infectives:   Vancomycin -  Zosyn -  Outpatient zyvox  Subjective:   Date of Consultation:  May 21, 2024  Referring Physician: Isaias Whitaker MD for: assistance in management of \"    5th toe osteomyellitis       Patient is a 53 y.o. male who was admitted with worsening drainage and odor from L foot, after undergoing 3024 L foot 5th MT head resection, cx with MRSA for which he was given Augmentin and most recently Linezolid. Started on Vanc/zosyn on admission. MRI with 5th MT and phalanges OM, soft tissue abscess and cellulitis. Taken to the OR yesterday for 5th toe amputation and MT head resection, cx not sent, path pending. Superficial cx from admission with MSSA and Enterococcus spp. Seen with wife at bedside. He clarifies that he was on linezolid for about 10 days prior to admission, with improvement in redness, however the toe became gangrenous.     Past Medical History:   Diagnosis Date    Acid reflux     daily medication    Diabetes (HCC)     Mounjaro (last dose 24) and oral medication- avg fasting 140-150- no hypo sx- last A1C 8.4 per pt    H/O heart artery stent     3 stents in     History of MI (myocardial infarction)     Hypertension     managed with medication    Smoker        Social History     Tobacco Use    Smoking status:

## 2024-05-21 NOTE — PROGRESS NOTES
Hospitalist Progress Note   Admit Date:  2024  2:07 PM   Name:  Nilay Whittaker   Age:  53 y.o.  Sex:  male  :  1970   MRN:  [id number]   Room:  [facility]/    Presenting/Chief Complaint: Wound Infection     Reason(s) for Admission: Cellulitis [L03.90]  Osteomyelitis of left foot, unspecified type (HCC) [M86.9]     Hospital Course:   Please refer to the admission H&P for details of presentation.      In summary, Nilay Whittaker is a 53 y.o. male with medical history significant for DM2, HTN, HLP Patient had surgery 2 weeks ago by Dr. Fierro with 5th metatarsal  head excision for concern of osteomyelitis.  Pt presents today with worsening drainage and odor of wound despite taking Augmentin and more recently linezolid.   Tissue culture from surgery  pos for MRSA.     Workup in the ED with leukocytosis WC of 14K.  X-ray of his foot shows osteopenia with ill-defined lucency of the proximal aspect of the fifth proximal digit, osteomyelitis not excluded.      Subjective/24 hr Events (24) :  Patient is seen and examined at bedside.    Patient laying bed.  Alert awake and oriented x 3.  Foot pain is intermittent mainly at nighttime but is controlled with pain medication.  Patient denies any shortness of breath, chest pain, fever or chills.    Assessment & Plan:   # Left Foot Cellulitis   # 5th left toe osteomyelitis  With recent 5th metatarsal head excision of OM. Was send home on PO abx but noted to have worsening drainage.  24:  MRI of the left foot shows osteomyelitis of the remaining fifth metatarsal and FIFTH toe phalanges with soft tissue abscess deep to the incision site.  -Orthopedics following. Pending MRI results for plan of treatment.  -continue with broad spectrum abx: zosyn and vanc for now. Monitor for vanc toxicity with vanc trough every few days and adjust vanc dose  - follow for wound and blood cultures  - may also need ID input, will defer for now    # HTN  # 
       Hospitalist Progress Note   Admit Date:  2024  2:07 PM   Name:  Nilay Whittaker   Age:  53 y.o.  Sex:  male  :  1970   MRN:  [id number]   Room:  [facility]/    Presenting/Chief Complaint: Wound Infection     Reason(s) for Admission: Cellulitis [L03.90]  Osteomyelitis of left foot, unspecified type (HCC) [M86.9]     Hospital Course:   Please refer to the admission H&P for details of presentation.      In summary, Nilay Whittaker is a 53 y.o. male with medical history significant for DM2, HTN, HLP Patient had surgery 2 weeks ago by Dr. Fierro with 5th metatarsal  head excision for concern of osteomyelitis.  Pt presents today with worsening drainage and odor of wound despite taking Augmentin and more recently linezolid.   Tissue culture from surgery  pos for MRSA.     Workup in the ED with leukocytosis WC of 14K.  X-ray of his foot shows osteopenia with ill-defined lucency of the proximal aspect of the fifth proximal digit, osteomyelitis not excluded.      Subjective/24 hr Events (24) :  Patient is seen and examined at bedside.    Patient sitting in his bed without any acute distress.  Alert awake and oriented x 3.  Foot pain is controlled with as needed pain medication.  Denies any chest pain, shortness of breath, nausea, vomiting.    Assessment & Plan:   # Left Foot Cellulitis   # 5th left toe osteomyelitis  With recent 5th metatarsal head excision of OM. Was send home on PO abx but noted to have worsening drainage.  - MRI ordered to ruled out OM  - will treat with broad spectrum abx: zosyn and vanc for now. Monitor for vanc toxicity with vanc trough every few days and adjust vanc dose  - follow for wound and blood cultures  - orthopedic surgery consulted.   - may also need ID input, will defer for now  - will start diet for now while pending MRI    # HTN  # HLD  BP controlled.  - resume norvasc, toprol, lisinopril, statin  - monitor and titrate as needed     # DM  A1 of 8.8. 
       Hospitalist Progress Note   Admit Date:  2024  2:07 PM   Name:  Nilay Wihttaker   Age:  53 y.o.  Sex:  male  :  1970   MRN:  [id number]   Room:  [facility]/    Presenting/Chief Complaint: Wound Infection     Reason(s) for Admission: Cellulitis [L03.90]  Osteomyelitis of left foot, unspecified type (HCC) [M86.9]     Hospital Course:   Please refer to the admission H&P for details of presentation.      In summary, Nilay Whittaker is a 53 y.o. male with medical history significant for DM2, HTN, HLP Patient had surgery 2 weeks ago by Dr. Fierro with 5th metatarsal  head excision for concern of osteomyelitis.  Pt presents today with worsening drainage and odor of wound despite taking Augmentin and more recently linezolid.   Tissue culture from surgery  pos for MRSA.     Workup in the ED with leukocytosis WC of 14K.  X-ray of his foot shows osteopenia with ill-defined lucency of the proximal aspect of the fifth proximal digit, osteomyelitis not excluded.      Subjective/24 hr Events (24) :  Patient is seen and examined at bedside.  Reports foot pain is controlled with the pain medicine.  Denies chest pain, cough, vomiting or abdominal pain.     MRI showed fifth toe osteomyelitis and deep abscess.  He has spoken to Ortho this morning and was given both options either to go to OR amputation versus debridement or managed conservatively with antibiotics only.  Patient is not sure about the final decision.  I will consult infectious disease for antibiotic recommendations    Assessment & Plan:   # Left Foot Cellulitis   # 5th left toe osteomyelitis  With recent 5th metatarsal head excision of OM. Was send home on PO abx but noted to have worsening drainage.  MRI of the left foot shows osteomyelitis of the remaining fifth metatarsal and FIFTH toe phalanges with soft tissue abscess deep to the incision site.  -Orthopedics following, possible OR today  -continue with broad spectrum abx: 
  [facility]        May 21, 2024         Post Op day: 1 Day Post-Op Procedure(s) (LRB):  LEFT FOOT DEBRIDEMENT INCISION AND DRAINAGE, POSSIBLE LEFT FIFTH TOE AMPUTATION (Left)      Admit Date: 2024  Admit Diagnosis: Cellulitis [L03.90]  Osteomyelitis of left foot, unspecified type (HCC) [M86.9]       Principle Problem: Cellulitis.           Subjective: Doing well, No complaints, No SOB, No Chest Pain, No Nausea or Vomiting     Objective:   Vital Signs are Stable, No Acute Distress, Alert,  Dressing is Dry,  Neurovascular exam is normal.     Assessment / Plan :  Patient Active Problem List   Diagnosis    HTN (hypertension)    Status post total knee replacement    HLD (hyperlipidemia)    Cellulitis    Patient Vitals for the past 8 hrs:   BP Temp Temp src Pulse Resp SpO2   24 0754 (!) 141/70 98.1 °F (36.7 °C) Oral 65 18 92 %   24 0419 136/75 98.2 °F (36.8 °C) Oral 68 18 90 %    Temp (24hrs), Av.7 °F (36.5 °C), Min:97.5 °F (36.4 °C), Max:98.2 °F (36.8 °C)    Body mass index is 38.78 kg/m².    Lab Results   Component Value Date/Time    HGB 13.7 2024 06:21 AM          S/P Procedure(s) (LRB):  LEFT FOOT DEBRIDEMENT INCISION AND DRAINAGE, POSSIBLE LEFT FIFTH TOE AMPUTATION (Left)      Medical Mgmt per hospitalist  Anticoagulation plan: none for ortho  Continue PT, WBAT with postop shoe through heel   Dressing change: Leave dressing in place, call if saturated   Fall Precautions  DC disp: home when cleared   F/U with Dr. Sanchez in next week for wound check   Call with any orthopedic questions or concerns.        Signed By: SOLOMON Saenz  2024,  9:04 AM      
4 Eyes Skin Assessment     NAME:  Nilay Whittaker  YOB: 1970  MEDICAL RECORD NUMBER:  [id number]    The patient is being assessed for  Admission    I agree that at least one RN has performed a thorough Head to Toe Skin Assessment on the patient. ALL assessment sites listed below have been assessed.      Areas assessed by both nurses:    Head, Face, Ears, Shoulders, Back, Chest, Arms, Elbows, Hands, Sacrum. Buttock, Coccyx, Ischium, Legs. Feet and Heels, and Under Medical Devices         Does the Patient have a Wound? Yes wound(s) were present on assessment. LDA wound assessment was Initiated and completed by RN  PATIENT HAS WOUND ON FIFTH TOW IN LEFT FOOT, AND ALONG SIDE OF FOOT PRESENT UPON ADMISSION.        Marquis Prevention initiated by RN: Yes  Wound Care Orders initiated by RN: No    Pressure Injury (Stage 3,4, Unstageable, DTI, NWPT, and Complex wounds) if present, place Wound referral order by RN under : No    New Ostomies, if present place, Ostomy referral order under : No     Nurse 1 eSignature: Electronically signed by Miryam De Paz RN on 5/17/24 at 10:21 PM EDT    **SHARE this note so that the co-signing nurse can place an eSignature**    Nurse 2 eSignature: {Esignature:[id number]}    
An initial visit was made to the patient. Ch visited with the pt, pt's family and friends. The  overviewed the chart prior to the visit and upon arrival introduce himself to the family. They are from a [employer]ist[employer] background, [employer]. They shared about the reason why the pt came back. The CH offered words of encouragement, and prayed for the pt and his family. Ch provided spiritual care and emotional support through pastoral presence, non-anxious presence, active listening, meaningful conversation, and prayer. CH is available as needed.  
Hourly rounding completed during shift. All needs met at this time. Medicated per MAR for pain and was effective.  Bed L/L with call bell in reach.  Report given to oncoming RN.   
Please complete the MRI screening form with signatures.   AYH  
Progress Note    Patient: Nilay Whittaker MRN: [id number]  SSN: [id number]    YOB: 1970  Age: 53 y.o.  Sex: male      Admit Date: 5/17/2024    LOS: 2 days     Subjective:     Patient is saying his left foot feels a little bit better this morning.  He is someone that had surgery about 3 weeks ago by the podiatry team for a known left diabetic foot ulcer.  He was admitted and we were consulted    Objective:     Vitals:    05/18/24 2100 05/19/24 0139 05/19/24 0209 05/19/24 0307   BP:    (!) 147/87   Pulse:    62   Resp: 16 20 16 18   Temp:    97.5 °F (36.4 °C)   TempSrc:    Oral   SpO2:    90%   Weight:       Height:            Intake and Output:  Current Shift: No intake/output data recorded.  Last three shifts: 05/17 1901 - 05/19 0700  In: 611.4 [I.V.:10]  Out: -     alert and oriented to person place time and situation  Skin -his left foot wound appears to be sort of stable.  He deftly has some erythema around this  Motor and sensory function intact in LEFT LOWER extremity  Pulses palpable in LEFT LOWER extremity       Lab/Data Review:  Recent Labs     05/18/24  0502   HGB 14.4   HCT 44.8          Assessment:     Chronic left diabetic foot ulcer    Plan:     So this seems like it would be something that is more chronic in nature.  He has this known diabetic foot ulcer.  I think it be reasonable to continue antibiotics and see if this gets any better.  It seems like it would have been a reasonable plan to follow-up with the excision of his metatarsal head with culture directed antibiotic therapy for some time.  I think that is still the most reasonable course of action.  If he needs surgical intervention for repeat debridement of this then I think the podiatry team could perform this if they were willing.  If they are not willing then I will see him again in the morning and see if he is having any clinical response to his antibiotics and then if he is not then we will proceed with either repeat 
Pt NPO since midnight, anticipating LEFT FOOT SURGERY in the AM after talking to anesthesia yesterday, told him he would have surgery this AM.     
TRANSFER - IN REPORT:    Verbal report received from ed on Nilay Whittaker  being received from ed for routine progression of patient care      Report consisted of patient's Situation, Background, Assessment and   Recommendations(SBAR).   Received from cailin nowak RN, as she received report on this patient  Information from the following report(s) Nurse Handoff Report, Index, ED Encounter Summary, ED SBAR, Adult Overview, Surgery Report, Intake/Output, MAR, and Recent Results was reviewed with the receiving nurse.    Opportunity for questions and clarification was provided.      Assessment completed upon patient's arrival to unit and care assumed.     
VANCO DAILY FOLLOW UP NOTE  [facility]   Pharmacy Pharmacokinetic Monitoring Service - Vancomycin    Consulting Provider: Dr. Kingston   Indication: SSTI/osteo of the foot with abscesses  Target Concentration: Goal AUC/NILESH 400-600 mg*hr/L  Day of Therapy: 4  Additional Antimicrobials: pip-tazo    Pertinent Laboratory Values:   Wt Readings from Last 1 Encounters:   05/17/24 (!) 160.1 kg (353 lb)     Temp Readings from Last 1 Encounters:   05/20/24 97.4 °F (36.3 °C) (Oral)     Recent Labs     05/17/24  1503 05/17/24  1906 05/18/24  0502 05/19/24  0922 05/20/24  0549   BUN 15  --  15 14 13   CREATININE 0.97  --  0.73* 0.80 0.71*   WBC 14.6*  --  8.6 8.5 8.4   PROCAL 0.05  --   --   --   --    LACTA 1.2 1.0  --   --   --      Estimated Creatinine Clearance: 207 mL/min (A) (based on SCr of 0.71 mg/dL (L)).    Lab Results   Component Value Date/Time    VANCORANDOM 13.2 05/19/2024 09:22 AM       MRSA Nasal Swab: N/A. Non-respiratory infection    Assessment:  Date/Time Dose Concentration AUC   5/19 0922 2000 mg Q12H 13.2 433   Note: Serum concentrations collected for AUC dosing may appear elevated if collected in close proximity to the dose administered, this is not necessarily an indication of toxicity    Plan:  Dosing recommendations based on Bayesian software  Continue vancomycin 2000 mg Q12H as regimen therapeutic  Renal labs as indicated   Vancomycin concentrations will be ordered as clinically appropriate  Pharmacy will continue to monitor patient and adjust therapy as indicated    Thank you for the consult,  Juan Luis Kraus RPH          
VANCO DAILY FOLLOW UP NOTE  [facility]   Pharmacy Pharmacokinetic Monitoring Service - Vancomycin    Consulting Provider: Dr. Kingston   Indication: SSTI/osteo of the foot with abscesses  Target Concentration: Goal AUC/NILESH 400-600 mg*hr/L  Day of Therapy: 3   Additional Antimicrobials: pip-tazo    Pertinent Laboratory Values:   Wt Readings from Last 1 Encounters:   05/17/24 (!) 160.1 kg (353 lb)     Temp Readings from Last 1 Encounters:   05/19/24 96.8 °F (36 °C) (Oral)     Recent Labs     05/17/24  1503 05/17/24  1906 05/18/24  0502 05/19/24  0922   BUN 15  --  15 14   CREATININE 0.97  --  0.73* 0.80   WBC 14.6*  --  8.6 8.5   PROCAL 0.05  --   --   --    LACTA 1.2 1.0  --   --      Estimated Creatinine Clearance: 184 mL/min (based on SCr of 0.8 mg/dL).    Lab Results   Component Value Date/Time    VANCORANDOM 13.2 05/19/2024 09:22 AM       MRSA Nasal Swab: N/A. Non-respiratory infection    Assessment:  Date/Time Dose Concentration AUC   5/19 0922 2000 mg Q12H 13.2 433   Note: Serum concentrations collected for AUC dosing may appear elevated if collected in close proximity to the dose administered, this is not necessarily an indication of toxicity    Plan:  Dosing recommendations based on Bayesian software  Continue vancomycin 2000 mg Q12H as regimen therapeutic  Renal labs as indicated   Vancomycin concentrations will be ordered as clinically appropriate  Pharmacy will continue to monitor patient and adjust therapy as indicated    Thank you for the consult,  Gm Soria [facility]        
VANCO DAILY FOLLOW UP NOTE  [facility]   Pharmacy Pharmacokinetic Monitoring Service - Vancomycin    Consulting Provider: Dr. Kingston   Indication: SSTI  Target Concentration: Goal AUC/NILESH 400-600 mg*hr/L  Day of Therapy: 2 of 7  Additional Antimicrobials: pip-tazo    Pertinent Laboratory Values:   Wt Readings from Last 1 Encounters:   05/17/24 (!) 160.1 kg (353 lb)     Temp Readings from Last 1 Encounters:   05/18/24 98.6 °F (37 °C) (Oral)     Recent Labs     05/17/24  1503 05/17/24  1906 05/18/24  0502   BUN 15  --  15   CREATININE 0.97  --  0.73*   WBC 14.6*  --  8.6   PROCAL 0.05  --   --    LACTA 1.2 1.0  --      Estimated Creatinine Clearance: 201 mL/min (A) (based on SCr of 0.73 mg/dL (L)).    No results found for: \"VANCOTROUGH\", \"VANCORANDOM\"    MRSA Nasal Swab: N/A. Non-respiratory infection    Assessment:  Date/Time Dose Concentration AUC         Note: Serum concentrations collected for AUC dosing may appear elevated if collected in close proximity to the dose administered, this is not necessarily an indication of toxicity    Plan:  Dosing recommendations based on Bayesian software  Continue vancomycin 2000 mg Q12H  Anticipated AUC of 538 and trough concentration of 15.8 at steady state  Renal labs as indicated   Vancomycin concentrations will be ordered as clinically appropriate  Pharmacy will continue to monitor patient and adjust therapy as indicated    Thank you for the consult,  Gm Soria [facility]      
VANCO DAILY FOLLOW UP NOTE  [facility]   Pharmacy Pharmacokinetic Monitoring Service - Vancomycin    Consulting Provider: Dr. Kingston   Indication: SSTI  Target Concentration: Goal AUC/NILESH 400-600 mg*hr/L  Day of Therapy: 1 of 7  Additional Antimicrobials: pip-tazo    Pertinent Laboratory Values:   Wt Readings from Last 1 Encounters:   05/17/24 (!) 160.1 kg (353 lb)     Temp Readings from Last 1 Encounters:   05/17/24 97.7 °F (36.5 °C) (Oral)     Recent Labs     05/17/24  1503 05/17/24  1906   BUN 15  --    CREATININE 0.97  --    WBC 14.6*  --    PROCAL 0.05  --    LACTA 1.2 1.0     Estimated Creatinine Clearance: 151 mL/min (based on SCr of 0.97 mg/dL).    No results found for: \"VANCOTROUGH\", \"VANCORANDOM\"    MRSA Nasal Swab: N/A. Non-respiratory infection    Assessment:  Date/Time Dose Concentration AUC         Note: Serum concentrations collected for AUC dosing may appear elevated if collected in close proximity to the dose administered, this is not necessarily an indication of toxicity    Plan:  Dosing recommendations based on Bayesian software  Start vancomycin 2500 mg X 1, then 2000 mg Q12H  Anticipated AUC of 538 and trough concentration of 15.8 at steady state  Renal labs as indicated   Vancomycin concentrations will be ordered as clinically appropriate  Pharmacy will continue to monitor patient and adjust therapy as indicated    Thank you for the consult,  Gm Soria [facility]    
WOUND CULTURE SENT TO LAB/ LATERAL FOOT AND, FIFTH TOE.     Wound cleaned with wound cleanser and xeroform placed, on fifth toe and lateral foot. Wound on toe is black with deep purulent drainage noted on the inside/underside of toe.   Foot: lateral foot, a 1.5 inch wound noted that also having purulent and serosanguinous drainage.     Xeroform placed after being cleansed, abd pad, and soft roll gauze, followed by woven roll gauze and self adherent bandage to keep intact.     MRI screening form competed.     Pain in control with PRN pain meds,    NPO since 0000 and will remain,        
functional tasks  Therapeutic Activity (10 Minutes): Therapeutic activity included Supine to Sit, Sit to Supine, Transfer Training, Ambulation on level ground, and Standing balance to improve functional Activity tolerance, Balance, Mobility, and Strength.    TREATMENT GRID:  N/A    AFTER TREATMENT PRECAUTIONS: Bed, Call light within reach, RN notified, and Visitors at bedside    INTERDISCIPLINARY COLLABORATION:  RN/ PCT    EDUCATION: Education Given To: Patient;Family  Education Provided: Role of Therapy;Plan of Care;Precautions  Education Method: Verbal  Education Outcome: Continued education needed    TIME IN/OUT:  Time In: 1039  Time Out: 1100  Minutes: 21    ALVIN PONCE, PT

## 2024-05-21 NOTE — DISCHARGE INSTRUCTIONS
[facility]        Patient Discharge Instructions    Nilay Whittaker / [id number] : 1970    Admitted 2024 Discharged: 2024     IF YOU HAVE ANY PROBLEMS ONCE YOU ARE AT HOME CALL THE FOLLOWING NUMBERS:   Main office number: [phone] ask for Aster (our medical assistant)  Office Address: [address] [place] [address] Dr. Ramirez [address] [place] [address]          Activity  As tolerated and as directed by your doctor.   Keep dressing dry and clean      Diet  Resume regular diet       Medications    The medications you are to continue on are listed on the medication reconciliation sheet.   Narcotic pain medications as well as supplemental iron can cause constipation. If this occurs try stopping the narcotic pain medication and/or the iron.   It is important that you take the medication exactly as they are prescribed.  Keep your medication in the bottles provided by the pharmacist and keep a list of the medication names, dosages, and times to be taken in your wallet.   Do not take other medications without consulting your doctor.       Other Important Information    Do NOT smoke as this will greatly increase your risk of infection!    Do not drive and operate hazardous machinery until being cleared to do so by your doctor     You are at a risk for falls. Use the rolling walker when walking.     Patients should not drive if they are still taking narcotic pain mediation during the daytime hours. Most patients wean themselves off of pain medication within 2-5 weeks after surgery.     Please give a list of your current medications to your Primary Care Provider and update this list whenever your medications are discontinued, doses are changed, or new medications (including over-the-counter products) are added. Please carry medication information at all times in case of emergency situations.    If you have sleep apnea and have a CPAP machine, please use it for all naps and sleeping.          When to

## 2024-05-21 NOTE — CARE COORDINATION
CM received consult for outpatient infusion. CM has arranged this through [facility] infusion, appoints scheduled for tomorrow 5/22/24 and 5/30/24. CM discussed therapy recommendation for a walker and home health, patient politely declined.  Spouse at bedside and able to provide transport home.     Lucinda LYN, ACM  [place]

## 2024-05-21 NOTE — DISCHARGE SUMMARY
Hospitalist Discharge Summary   Admit Date:  2024  2:07 PM   DC Note date: 2024  Name:  Nilay Whittaker   Age:  53 y.o.  Sex:  male  :  1970   MRN:  [id number]   Room:  [facility]  PCP:  Sania Duggan APRN - NP    Presenting Complaint: Wound Infection     Initial Admission Diagnosis: Cellulitis [L03.90]  Osteomyelitis of left foot, unspecified type (HCC) [M86.9]     Problem List for this Hospitalization (present on admission):    Principal Problem:    Cellulitis  Active Problems:    HTN (hypertension)    HLD (hyperlipidemia)    Osteomyelitis of fifth toe of left foot (HCC)    Amputation of fifth toe of left foot (HCC)  Resolved Problems:    * No resolved hospital problems. *      Hospital Course:  Please refer to the admission H&P for details of presentation.       In summary, Nilay Whittaker is a 53 y.o. male with medical history significant for DM2, HTN, HLP Patient had surgery 2 weeks ago by Dr. Fierro with 5th metatarsal  head excision for concern of osteomyelitis.  Pt presented with worsening drainage and odor of wound despite taking Augmentin and more recently linezolid.   Tissue culture from surgery  positive for MRSA.       Workup in the ED with leukocytosis WC of 14K.  X-ray of his foot shows osteopenia with ill-defined lucency of the proximal aspect of the fifth proximal digit, osteomyelitis not excluded.     Patient admitted with osteomyelitis of left fifth toe with surrounding cellulitis.  Started on empiric antibiotics Zosyn and vancomycin. MRI of the left foot shows osteomyelitis of the remaining fifth metatarsal and 5th toe phalanges with soft tissue abscess deep to the incision site.  Ortho consulted and patient status post repeat excisional  debridement of left foot wound and 5th toe amputation and 5th MT shaft on .  Superficial culture with MSSA and Enterococcus.    Infectious disease consulted and recommend dalbavancin 1500 mg x 2 doses, given 8 days

## 2024-05-22 ENCOUNTER — HOSPITAL ENCOUNTER (OUTPATIENT)
Dept: INFUSION THERAPY | Age: 54
Setting detail: INFUSION SERIES
Discharge: HOME | End: 2024-05-22
Payer: COMMERCIAL

## 2024-05-22 VITALS
OXYGEN SATURATION: 92 % | HEART RATE: 79 BPM | SYSTOLIC BLOOD PRESSURE: 169 MMHG | DIASTOLIC BLOOD PRESSURE: 79 MMHG | TEMPERATURE: 97.4 F | RESPIRATION RATE: 18 BRPM

## 2024-05-22 DIAGNOSIS — M86.9 OSTEOMYELITIS OF FIFTH TOE OF LEFT FOOT (HCC): Primary | ICD-10-CM

## 2024-05-22 LAB
BACTERIA SPEC CULT: NORMAL
BACTERIA SPEC CULT: NORMAL
SERVICE CMNT-IMP: NORMAL
SERVICE CMNT-IMP: NORMAL

## 2024-05-22 PROCEDURE — 6360000002 HC RX W HCPCS: Performed by: NURSE PRACTITIONER

## 2024-05-22 PROCEDURE — 2580000003 HC RX 258: Performed by: INTERNAL MEDICINE

## 2024-05-22 PROCEDURE — 96365 THER/PROPH/DIAG IV INF INIT: CPT

## 2024-05-22 PROCEDURE — 2580000003 HC RX 258: Performed by: NURSE PRACTITIONER

## 2024-05-22 RX ORDER — ACETAMINOPHEN 325 MG/1
650 TABLET ORAL
Status: CANCELLED | OUTPATIENT
Start: 2024-05-30

## 2024-05-22 RX ORDER — DIPHENHYDRAMINE HYDROCHLORIDE 50 MG/ML
50 INJECTION INTRAMUSCULAR; INTRAVENOUS
Status: DISCONTINUED | OUTPATIENT
Start: 2024-05-22 | End: 2024-05-23 | Stop reason: HOSPADM

## 2024-05-22 RX ORDER — SODIUM CHLORIDE 9 MG/ML
5-250 INJECTION, SOLUTION INTRAVENOUS PRN
Status: CANCELLED | OUTPATIENT
Start: 2024-05-30

## 2024-05-22 RX ORDER — ONDANSETRON 2 MG/ML
8 INJECTION INTRAMUSCULAR; INTRAVENOUS
Status: DISCONTINUED | OUTPATIENT
Start: 2024-05-22 | End: 2024-05-23 | Stop reason: HOSPADM

## 2024-05-22 RX ORDER — EPINEPHRINE 1 MG/ML
0.3 INJECTION, SOLUTION, CONCENTRATE INTRAVENOUS PRN
Status: DISCONTINUED | OUTPATIENT
Start: 2024-05-22 | End: 2024-05-23 | Stop reason: HOSPADM

## 2024-05-22 RX ORDER — ALBUTEROL SULFATE 2.5 MG/3ML
2.5 SOLUTION RESPIRATORY (INHALATION)
Status: CANCELLED | OUTPATIENT
Start: 2024-05-30

## 2024-05-22 RX ORDER — ONDANSETRON 2 MG/ML
8 INJECTION INTRAMUSCULAR; INTRAVENOUS
Status: CANCELLED | OUTPATIENT
Start: 2024-05-30

## 2024-05-22 RX ORDER — EPINEPHRINE 1 MG/ML
0.3 INJECTION, SOLUTION, CONCENTRATE INTRAVENOUS PRN
Status: CANCELLED | OUTPATIENT
Start: 2024-05-30

## 2024-05-22 RX ORDER — DIPHENHYDRAMINE HYDROCHLORIDE 50 MG/ML
50 INJECTION INTRAMUSCULAR; INTRAVENOUS
Status: CANCELLED | OUTPATIENT
Start: 2024-05-30

## 2024-05-22 RX ORDER — ALBUTEROL SULFATE 90 UG/1
4 AEROSOL, METERED RESPIRATORY (INHALATION) PRN
Status: CANCELLED | OUTPATIENT
Start: 2024-05-30

## 2024-05-22 RX ORDER — SODIUM CHLORIDE 0.9 % (FLUSH) 0.9 %
5-40 SYRINGE (ML) INJECTION PRN
Status: CANCELLED | OUTPATIENT
Start: 2024-05-30

## 2024-05-22 RX ORDER — SODIUM CHLORIDE 9 MG/ML
INJECTION, SOLUTION INTRAVENOUS CONTINUOUS
Status: CANCELLED | OUTPATIENT
Start: 2024-05-30

## 2024-05-22 RX ORDER — HEPARIN 100 UNIT/ML
500 SYRINGE INTRAVENOUS PRN
Status: CANCELLED | OUTPATIENT
Start: 2024-05-30

## 2024-05-22 RX ORDER — ALBUTEROL SULFATE 90 UG/1
4 AEROSOL, METERED RESPIRATORY (INHALATION) PRN
Status: DISCONTINUED | OUTPATIENT
Start: 2024-05-22 | End: 2024-05-23 | Stop reason: HOSPADM

## 2024-05-22 RX ORDER — ACETAMINOPHEN 325 MG/1
650 TABLET ORAL
Status: DISCONTINUED | OUTPATIENT
Start: 2024-05-22 | End: 2024-05-23 | Stop reason: HOSPADM

## 2024-05-22 RX ORDER — ALBUTEROL SULFATE 2.5 MG/3ML
2.5 SOLUTION RESPIRATORY (INHALATION)
Status: DISCONTINUED | OUTPATIENT
Start: 2024-05-22 | End: 2024-05-23 | Stop reason: HOSPADM

## 2024-05-22 RX ORDER — SODIUM CHLORIDE 0.9 % (FLUSH) 0.9 %
5-40 SYRINGE (ML) INJECTION PRN
Status: DISCONTINUED | OUTPATIENT
Start: 2024-05-22 | End: 2024-05-23 | Stop reason: HOSPADM

## 2024-05-22 RX ADMIN — SODIUM CHLORIDE, PRESERVATIVE FREE 10 ML: 5 INJECTION INTRAVENOUS at 15:51

## 2024-05-22 RX ADMIN — DALBAVANCIN 1500 MG: 500 INJECTION, POWDER, FOR SOLUTION INTRAVENOUS at 16:21

## 2024-05-22 ASSESSMENT — PAIN SCALES - GENERAL: PAINLEVEL_OUTOF10: 4

## 2024-05-22 ASSESSMENT — PAIN DESCRIPTION - LOCATION: LOCATION: FOOT

## 2024-05-22 ASSESSMENT — PAIN DESCRIPTION - ORIENTATION: ORIENTATION: LEFT

## 2024-05-22 NOTE — PROGRESS NOTES
Patient arrived to infusion. Placed in isolation room. IV abx administered. Patient tolerated well. PIV removed. Discharged ambulatory. Patient aware of next infusion on 5/30.

## 2024-05-25 LAB
BACTERIA ISLT CULT: NORMAL
BACTERIA SPEC CULT: ABNORMAL
GRAM STN SPEC: ABNORMAL
GRAM STN SPEC: ABNORMAL
OTHER ANTIBIOTIC ISLT MIC: NORMAL
SERVICE CMNT-IMP: ABNORMAL
SPECIMEN SOURCE: NORMAL
SPECIMEN SOURCE: NORMAL

## 2024-05-27 LAB
BACTERIA ISLT CULT: NORMAL
SPECIMEN SOURCE: NORMAL

## 2024-05-28 ENCOUNTER — OFFICE VISIT (OUTPATIENT)
Dept: ORTHOPEDIC SURGERY | Age: 54
End: 2024-05-28

## 2024-05-28 DIAGNOSIS — S98.132A AMPUTATION OF FIFTH TOE OF LEFT FOOT (HCC): Primary | ICD-10-CM

## 2024-05-28 DIAGNOSIS — L03.032 CELLULITIS OF TOE OF LEFT FOOT: ICD-10-CM

## 2024-05-28 PROCEDURE — 99024 POSTOP FOLLOW-UP VISIT: CPT | Performed by: ORTHOPAEDIC SURGERY

## 2024-05-28 NOTE — PROGRESS NOTES
Progress Note    Patient: Nilay Whittaker MRN: [id number]  SSN: [id number]    YOB: 1970  Age: 53 y.o.  Sex: male        5/28/2024      Subjective:     Patient is here today about 8 days out from a revision surgery.  He is a gentleman who had a podiatrist excise a portion of his metatarsal for osteomyelitis but it does not seem like the antibiotic plan after that was really culture directed but just would involve just taking some oral antibiotics.  This then became red and infected so he was admitted and I was consulted.  Taken back to the operating room where actually went ahead and is amputated a small toe and performed repeat debridement and resected little bit more the fifth metatarsal now he is getting some IV antibiotics.  He seems that he is doing pretty well    Objective:     There were no vitals filed for this visit.       Physical Exam:     Skin - incision is well healed with no redness or drainage  Motor and sensory function intact in LEFT LOWER extremity  Pulses palpable in LEFT LOWER extremity     XRAY FINDINGS:  No new x-rays    Assessment:     1 week out from revision amputation left osteomyelitis    Plan:     I think we can redress this.  I think he can walk on his hindfoot/heel when his postop shoe I will see him back in 1 week and take all of his stitches out and he is to continue with his IV antibiotics until then.    Signed By: Garrett Sanchez MD     May 28, 2024

## 2024-05-30 ENCOUNTER — HOSPITAL ENCOUNTER (OUTPATIENT)
Dept: INFUSION THERAPY | Age: 54
Setting detail: INFUSION SERIES
Discharge: HOME | End: 2024-05-30
Payer: COMMERCIAL

## 2024-05-30 VITALS
TEMPERATURE: 97.1 F | SYSTOLIC BLOOD PRESSURE: 107 MMHG | RESPIRATION RATE: 18 BRPM | OXYGEN SATURATION: 95 % | HEART RATE: 78 BPM | DIASTOLIC BLOOD PRESSURE: 69 MMHG

## 2024-05-30 DIAGNOSIS — M86.9 OSTEOMYELITIS OF FIFTH TOE OF LEFT FOOT (HCC): Primary | ICD-10-CM

## 2024-05-30 PROCEDURE — 2580000003 HC RX 258: Performed by: NURSE PRACTITIONER

## 2024-05-30 PROCEDURE — 2580000003 HC RX 258: Performed by: INTERNAL MEDICINE

## 2024-05-30 PROCEDURE — 6360000002 HC RX W HCPCS: Performed by: NURSE PRACTITIONER

## 2024-05-30 PROCEDURE — 96365 THER/PROPH/DIAG IV INF INIT: CPT

## 2024-05-30 RX ORDER — ALBUTEROL SULFATE 2.5 MG/3ML
2.5 SOLUTION RESPIRATORY (INHALATION)
OUTPATIENT
Start: 2024-05-30

## 2024-05-30 RX ORDER — ONDANSETRON 2 MG/ML
8 INJECTION INTRAMUSCULAR; INTRAVENOUS
OUTPATIENT
Start: 2024-05-30

## 2024-05-30 RX ORDER — DIPHENHYDRAMINE HYDROCHLORIDE 50 MG/ML
50 INJECTION INTRAMUSCULAR; INTRAVENOUS
OUTPATIENT
Start: 2024-05-30

## 2024-05-30 RX ORDER — SODIUM CHLORIDE 9 MG/ML
INJECTION, SOLUTION INTRAVENOUS CONTINUOUS
OUTPATIENT
Start: 2024-05-30

## 2024-05-30 RX ORDER — ALBUTEROL SULFATE 90 UG/1
4 AEROSOL, METERED RESPIRATORY (INHALATION) PRN
OUTPATIENT
Start: 2024-05-30

## 2024-05-30 RX ORDER — SODIUM CHLORIDE 0.9 % (FLUSH) 0.9 %
5-40 SYRINGE (ML) INJECTION PRN
Status: DISCONTINUED | OUTPATIENT
Start: 2024-05-30 | End: 2024-05-31 | Stop reason: HOSPADM

## 2024-05-30 RX ORDER — ACETAMINOPHEN 325 MG/1
650 TABLET ORAL
OUTPATIENT
Start: 2024-05-30

## 2024-05-30 RX ORDER — SODIUM CHLORIDE 9 MG/ML
5-250 INJECTION, SOLUTION INTRAVENOUS PRN
OUTPATIENT
Start: 2024-05-30

## 2024-05-30 RX ORDER — EPINEPHRINE 1 MG/ML
0.3 INJECTION, SOLUTION, CONCENTRATE INTRAVENOUS PRN
OUTPATIENT
Start: 2024-05-30

## 2024-05-30 RX ORDER — HEPARIN 100 UNIT/ML
500 SYRINGE INTRAVENOUS PRN
OUTPATIENT
Start: 2024-05-30

## 2024-05-30 RX ORDER — SODIUM CHLORIDE 0.9 % (FLUSH) 0.9 %
5-40 SYRINGE (ML) INJECTION PRN
OUTPATIENT
Start: 2024-05-30

## 2024-05-30 RX ADMIN — DALBAVANCIN 1500 MG: 500 INJECTION, POWDER, FOR SOLUTION INTRAVENOUS at 12:03

## 2024-05-30 RX ADMIN — SODIUM CHLORIDE, PRESERVATIVE FREE 10 ML: 5 INJECTION INTRAVENOUS at 11:30

## 2024-05-30 ASSESSMENT — PAIN DESCRIPTION - LOCATION: LOCATION: FOOT

## 2024-05-30 ASSESSMENT — PAIN SCALES - GENERAL: PAINLEVEL_OUTOF10: 3

## 2024-05-30 ASSESSMENT — PAIN DESCRIPTION - ORIENTATION: ORIENTATION: LEFT

## 2024-05-30 NOTE — PROGRESS NOTES
Arrived to the Infusion Center. Orders reviewed; [facility] infusion completed. Patient tolerated well.   Any issues or concerns during appointment: none.  Patient to follow up with Dr. Garrett Sanchez on 6/6/2024 @ 2186  Patient instructed to call provider with temperature of 100.4 or greater or nausea/vomiting/ diarrhea or pain not controlled by medications  Discharged ambulatory.

## 2024-06-06 ENCOUNTER — OFFICE VISIT (OUTPATIENT)
Dept: ORTHOPEDIC SURGERY | Age: 54
End: 2024-06-06

## 2024-06-06 DIAGNOSIS — S98.132A AMPUTATION OF FIFTH TOE OF LEFT FOOT (HCC): Primary | ICD-10-CM

## 2024-06-06 PROCEDURE — 99024 POSTOP FOLLOW-UP VISIT: CPT | Performed by: ORTHOPAEDIC SURGERY

## 2024-06-06 NOTE — PROGRESS NOTES
Progress Note    Patient: Nilay Whittaker MRN: [id number]  SSN: [id number]    YOB: 1970  Age: 53 y.o.  Sex: male        6/6/2024      Subjective:     Patient is here today in follow-up.  He is about 3 weeks out now from a basically amputation almost the regulation of his fifth toe.  He had a partial excision of his fifth metatarsal by another physician and then had some redness and so when I took him back to the operating room we just went ahead and amputate his small toe took out some more of his metatarsal washes out closed and he was placed on some antibiotics.  He seems to be doing much better    Objective:     There were no vitals filed for this visit.       Physical Exam:     Skin - incision is well healed with no redness or drainage  Motor and sensory function intact in LEFT LOWER extremity  Pulses palpable in LEFT LOWER extremity     XRAY FINDINGS:  No new x-rays    Assessment:     Improving left small toe amputation now about 3 weeks out    Plan:     I think we can get his stitches out today.  I think he can essentially be activity as tolerated.  We can give him a note to be out of work for little bit longer just to make sure this is solidly healed as he does have a pretty physically active job and he can return to work and I think he can go back to work in 2 weeks and come back to see me in about 6 weeks    Signed By: Garrett Sanchez MD     June 6, 2024

## 2024-07-18 ENCOUNTER — OFFICE VISIT (OUTPATIENT)
Dept: ORTHOPEDIC SURGERY | Age: 54
End: 2024-07-18

## 2024-07-18 DIAGNOSIS — M86.9 OSTEOMYELITIS OF FIFTH TOE OF LEFT FOOT (HCC): Primary | ICD-10-CM

## 2024-07-18 PROCEDURE — 99024 POSTOP FOLLOW-UP VISIT: CPT | Performed by: ORTHOPAEDIC SURGERY

## 2024-07-18 NOTE — PROGRESS NOTES
Progress Note    Patient: Nilay Whittaker MRN: [id number]  SSN: [id number]    YOB: 1970  Age: 53 y.o.  Sex: male        7/18/2024      Subjective:     Patient is here today in follow-up.  He is now about 2 months out from I&D of his foot and sort of a revision amputation.  I think one of the podiatrist in town had done some work on his left foot for infection and then in the postoperative period he had sort of a flareup of this he was admitted has some IV antibiotics and I washed this out and sort of revise the amputation.  He then got some antibiotics.  That is doing pretty well.  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  But in general he seems like he is pretty pleased with everything except for he just open the swelling will get better he is also saying that he has a small area on his right foot as well    Objective:     There were no vitals filed for this visit.       Physical Exam:     Skin - incision is well healed with no redness or drainage on the lateral border of his left foot, on his right middle toe he does have a little pressure sore.  There is no redness there is no drainage in this area.  It does appear just to be a little pressure sore right on the tip of his toe.  Motor and sensory function intact in LEFT LOWER extremity  Pulses palpable in LEFT LOWER extremity     XRAY FINDINGS:  No new x-rays    Assessment:     Healed left foot wound    Plan:     So I think for the left side he seems to be doing well do not think he needs any follow-up care for that.  He says that he is supposed to see one of the podiatrist this afternoon or tomorrow and he is hopeful that they might be able to give him some inserts for his foot I think is a good idea.  He also has this area on his right foot.  I showed him some sort of toe protectors just gel protectors that I

## 2024-09-23 ENCOUNTER — OFFICE VISIT (OUTPATIENT)
Dept: ORTHOPEDIC SURGERY | Age: 54
End: 2024-09-23
Payer: COMMERCIAL

## 2024-09-23 DIAGNOSIS — M86.171 OTHER ACUTE OSTEOMYELITIS OF RIGHT FOOT: Primary | ICD-10-CM

## 2024-09-23 DIAGNOSIS — R52 PAIN: ICD-10-CM

## 2024-09-23 PROBLEM — M86.9 PYOGENIC INFLAMMATION OF BONE: Status: ACTIVE | Noted: 2024-09-23

## 2024-09-23 PROCEDURE — 99204 OFFICE O/P NEW MOD 45 MIN: CPT | Performed by: PHYSICIAN ASSISTANT

## 2024-09-23 PROCEDURE — M5009 MISC POST OP SHOE: HCPCS | Performed by: PHYSICIAN ASSISTANT

## 2024-09-23 RX ORDER — SULFAMETHOXAZOLE/TRIMETHOPRIM 800-160 MG
1 TABLET ORAL 2 TIMES DAILY
Qty: 20 TABLET | Refills: 0 | Status: SHIPPED | OUTPATIENT
Start: 2024-09-23 | End: 2024-10-03

## 2024-09-26 ENCOUNTER — OFFICE VISIT (OUTPATIENT)
Dept: ORTHOPEDIC SURGERY | Age: 54
End: 2024-09-26
Payer: COMMERCIAL

## 2024-09-26 DIAGNOSIS — M79.671 PAIN IN RIGHT FOOT: ICD-10-CM

## 2024-09-26 PROCEDURE — 99205 OFFICE O/P NEW HI 60 MIN: CPT | Performed by: ORTHOPAEDIC SURGERY

## 2024-09-27 ENCOUNTER — TELEPHONE (OUTPATIENT)
Dept: ORTHOPEDIC SURGERY | Age: 54
End: 2024-09-27

## 2024-09-27 DIAGNOSIS — G89.18 POST-OP PAIN: Primary | ICD-10-CM

## 2024-09-30 RX ORDER — DOCUSATE SODIUM 100 MG/1
100 CAPSULE, LIQUID FILLED ORAL DAILY PRN
Qty: 30 CAPSULE | Refills: 0 | Status: SHIPPED | OUTPATIENT
Start: 2024-09-30

## 2024-09-30 RX ORDER — OXYCODONE HYDROCHLORIDE 5 MG/1
5 TABLET ORAL EVERY 6 HOURS PRN
Qty: 20 TABLET | Refills: 0 | Status: SHIPPED | OUTPATIENT
Start: 2024-09-30 | End: 2024-10-05

## 2024-09-30 RX ORDER — ONDANSETRON 4 MG/1
4 TABLET, FILM COATED ORAL DAILY PRN
Qty: 30 TABLET | Refills: 0 | Status: SHIPPED | OUTPATIENT
Start: 2024-09-30

## 2024-09-30 NOTE — PERIOP NOTE
[facility] invitation sent to patient via text message on 24. Attempted to send patient whoactually message with pre-op instructions but my chart not activated as of 10/1/24.  Patient verified name and .  Order for consent found in EHR and matches case posting; patient verifies procedure.   Type 1B surgery, PAT phone assessment complete.  Orders received.  Labs per surgeon: none  Labs per anesthesia protocol: POC glucose DOS    Patient answered medical/surgical history questions at their best of ability. All prior to admission medications documented in EPIC.    Patient instructed to continue taking all prescription medications up to the day of surgery but to take only the following medications the day of surgery according to anesthesia guidelines with a small sip of water: none. Continue nightly aspirin 81 mg. Also, patient is requested to take 2 Tylenol in the morning and then again before bed on the day before surgery. Regular or extra strength may be used.       Patient informed that all vitamins and supplements should be held 7 days prior to surgery and NSAIDS 5 days prior to surgery. Prescription meds to hold:naproxen, Mounjaro    Patient instructed on the following:  The GLP-1 agonists are associated with adverse gastrointestinal effects such as nausea, vomiting, and delayed gastric emptying. Delayed gastric emptying from GLP-1 agonists can increase the risk of regurgitation and pulmonary aspiration of gastric contents during general anesthesia and deep sedation.     The recommendation for patients taking Glucagon-Like Peptide-1 (GLP-1) Receptor Agonists is to hold these drugs for 1 week prior to surgery for weekly dosing. You are also required to have nothing after midnight and keep a clear liquid diet the day before your surgery. The accepted clear liquids are included below.     CLEAR LIQUID DIET    Things included in a clear liquid diet:     Water  Black Coffee (may have sugar but no milk or

## 2024-10-01 ENCOUNTER — TELEPHONE (OUTPATIENT)
Dept: ORTHOPEDIC SURGERY | Age: 54
End: 2024-10-01

## 2024-10-01 NOTE — PROGRESS NOTES
Name: Nilay Whittaker  YOB: 1970  Gender: male  MRN: [id number]    Summary: Right plantar wound with suspected fifth metatarsal head osteomyelitis    Proceed with right fifth metatarsal head resection, irrigation debridement, implantation of antibiotics with drain placement    Circumferential ankle block with MAC -most likely postoperative admittance for antibiotics     CC: right plantar wound    HPI: Nilay Whittaker is a 53 y.o. male who presents due to a wound on their foot that has been present for approximately 2 months.  Patient states that he had been seeing a podiatrist and underwent debridement and was placed on levofloxacin but has no improvement in his wound.  He is concerned because he had something similar on the left foot that required surgical removal of the fifth metatarsal head, which was done by podiatry.  He states that following the removal of the fifth metatarsal head he got a another infection which caused him to have gangrene of his fifth toe.  Dr. Sanchez amputated his left fifth toe on 5/20/2024.    10/1/24-now obtained an MRI.  Confirming infection.  Patient is to me today to discuss surgical options and to discuss what else there is to do.    ROS/Meds/PSH/PMH/FH/SH:   Patient Denies fever/chills, headache, visual changes, chest pain, shortness of breath, and nausea/vomiting/diarrhea     Tobacco:  reports that he has been smoking cigarettes. He has never used smokeless tobacco.  Diabetes: Diabetic - Insulin dependent  Lab Results   Component Value Date    LABA1C 8.8 (H) 05/17/2024       Physical Examination:  Gen: AAOx3 NAD    right lower:   Gastroc Contracture noted on silverskoid exam: With the hindfoot in neutral and forefoot supinated ankle dorsiflexion is diminished with knee in extension when compared to the knee at 90deg  Neutral hindfoot alignment.  No cavovarus nor planovalgus foot deformity  Today the are hear primary to discuss their wound.   There

## 2024-10-01 NOTE — H&P (VIEW-ONLY)
Name: Nilay Whittaker  YOB: 1970  Gender: male  MRN: [id number]    Summary: Right plantar wound with suspected fifth metatarsal head osteomyelitis    Proceed with right fifth metatarsal head resection, irrigation debridement, implantation of antibiotics with drain placement    Circumferential ankle block with MAC -most likely postoperative admittance for antibiotics     CC: right plantar wound    HPI: Nilay Whittaker is a 53 y.o. male who presents due to a wound on their foot that has been present for approximately 2 months.  Patient states that he had been seeing a podiatrist and underwent debridement and was placed on levofloxacin but has no improvement in his wound.  He is concerned because he had something similar on the left foot that required surgical removal of the fifth metatarsal head, which was done by podiatry.  He states that following the removal of the fifth metatarsal head he got a another infection which caused him to have gangrene of his fifth toe.  Dr. Sanchez amputated his left fifth toe on 5/20/2024.    10/1/24-now obtained an MRI.  Confirming infection.  Patient is to me today to discuss surgical options and to discuss what else there is to do.    ROS/Meds/PSH/PMH/FH/SH:   Patient Denies fever/chills, headache, visual changes, chest pain, shortness of breath, and nausea/vomiting/diarrhea     Tobacco:  reports that he has been smoking cigarettes. He has never used smokeless tobacco.  Diabetes: Diabetic - Insulin dependent  Lab Results   Component Value Date    LABA1C 8.8 (H) 05/17/2024       Physical Examination:  Gen: AAOx3 NAD    right lower:   Gastroc Contracture noted on silverskoid exam: With the hindfoot in neutral and forefoot supinated ankle dorsiflexion is diminished with knee in extension when compared to the knee at 90deg  Neutral hindfoot alignment.  No cavovarus nor planovalgus foot deformity  Today the are hear primary to discuss their wound.   There

## 2024-10-04 ENCOUNTER — TELEPHONE (OUTPATIENT)
Dept: ORTHOPEDIC SURGERY | Age: 54
End: 2024-10-04

## 2024-10-04 NOTE — TELEPHONE ENCOUNTER
Patient called in wanting a copy of his FMLA/STD Form. Patient stated he gave it directly to Nicole. Sent her a message to leave copy at Jessica mayberry. For patient.

## 2024-10-07 ENCOUNTER — CLINICAL DOCUMENTATION (OUTPATIENT)
Dept: ORTHOPEDIC SURGERY | Age: 54
End: 2024-10-07

## 2024-10-07 NOTE — PROGRESS NOTES
Patient called in asking about his FMLA/STD form that there is no record of. Sent a message to Dania Siegel

## 2024-10-08 ENCOUNTER — ANESTHESIA EVENT (OUTPATIENT)
Dept: SURGERY | Age: 54
End: 2024-10-08
Payer: COMMERCIAL

## 2024-10-08 NOTE — PERIOP NOTE
Preop department called to notify patient of arrival time for scheduled procedure. Instructions given to   - Arrive at [facility] Entrance [address].  - Remain NPO after midnight, unless otherwise indicated, including gum, mints, and ice chips.   - Have a responsible adult to drive patient to the hospital, stay during surgery, and patient will need supervision 24 hours after anesthesia.   - Use antibacterial soap in shower the night before surgery and on the morning of surgery.       Was patient contacted: yes  Voicemail left:   Numbers contacted: [phone]   Arrival time: 1000

## 2024-10-09 ENCOUNTER — HOSPITAL ENCOUNTER (OUTPATIENT)
Age: 54
Setting detail: OBSERVATION
LOS: 1 days | Discharge: HOME | End: 2024-10-10
Attending: ORTHOPAEDIC SURGERY | Admitting: ORTHOPAEDIC SURGERY
Payer: COMMERCIAL

## 2024-10-09 ENCOUNTER — APPOINTMENT (OUTPATIENT)
Dept: GENERAL RADIOLOGY | Age: 54
End: 2024-10-09
Attending: ORTHOPAEDIC SURGERY
Payer: COMMERCIAL

## 2024-10-09 ENCOUNTER — ANESTHESIA (OUTPATIENT)
Dept: SURGERY | Age: 54
End: 2024-10-09
Payer: COMMERCIAL

## 2024-10-09 DIAGNOSIS — S98.132A AMPUTATION OF FIFTH TOE OF LEFT FOOT (HCC): Primary | ICD-10-CM

## 2024-10-09 PROBLEM — Z98.890 STATUS POST FOOT SURGERY: Status: ACTIVE | Noted: 2024-10-09

## 2024-10-09 PROBLEM — E11.628 DIABETIC INFECTION OF RIGHT FOOT (HCC): Status: ACTIVE | Noted: 2024-10-09

## 2024-10-09 PROBLEM — L08.9 DIABETIC INFECTION OF RIGHT FOOT (HCC): Status: ACTIVE | Noted: 2024-10-09

## 2024-10-09 LAB
CK SERPL-CCNC: 429 U/L (ref 21–215)
GLUCOSE BLD STRIP.AUTO-MCNC: 183 MG/DL (ref 65–100)
GLUCOSE BLD STRIP.AUTO-MCNC: 224 MG/DL (ref 65–100)
SERVICE CMNT-IMP: ABNORMAL
SERVICE CMNT-IMP: ABNORMAL

## 2024-10-09 PROCEDURE — 82962 GLUCOSE BLOOD TEST: CPT

## 2024-10-09 PROCEDURE — 87186 SC STD MICRODIL/AGAR DIL: CPT

## 2024-10-09 PROCEDURE — 3600000012 HC SURGERY LEVEL 2 ADDTL 15MIN: Performed by: ORTHOPAEDIC SURGERY

## 2024-10-09 PROCEDURE — 64450 NJX AA&/STRD OTHER PN/BRANCH: CPT | Performed by: ANESTHESIOLOGY

## 2024-10-09 PROCEDURE — 6360000002 HC RX W HCPCS: Performed by: ANESTHESIOLOGY

## 2024-10-09 PROCEDURE — 82550 ASSAY OF CK (CPK): CPT

## 2024-10-09 PROCEDURE — 2580000003 HC RX 258: Performed by: ANESTHESIOLOGY

## 2024-10-09 PROCEDURE — 3700000000 HC ANESTHESIA ATTENDED CARE: Performed by: ORTHOPAEDIC SURGERY

## 2024-10-09 PROCEDURE — 36415 COLL VENOUS BLD VENIPUNCTURE: CPT

## 2024-10-09 PROCEDURE — 2580000003 HC RX 258: Performed by: ORTHOPAEDIC SURGERY

## 2024-10-09 PROCEDURE — 2580000003 HC RX 258: Performed by: NURSE ANESTHETIST, CERTIFIED REGISTERED

## 2024-10-09 PROCEDURE — 7100000000 HC PACU RECOVERY - FIRST 15 MIN: Performed by: ORTHOPAEDIC SURGERY

## 2024-10-09 PROCEDURE — 7100000001 HC PACU RECOVERY - ADDTL 15 MIN: Performed by: ORTHOPAEDIC SURGERY

## 2024-10-09 PROCEDURE — 3600000002 HC SURGERY LEVEL 2 BASE: Performed by: ORTHOPAEDIC SURGERY

## 2024-10-09 PROCEDURE — 3700000001 HC ADD 15 MINUTES (ANESTHESIA): Performed by: ORTHOPAEDIC SURGERY

## 2024-10-09 PROCEDURE — 2500000003 HC RX 250 WO HCPCS: Performed by: NURSE ANESTHETIST, CERTIFIED REGISTERED

## 2024-10-09 PROCEDURE — 6370000000 HC RX 637 (ALT 250 FOR IP): Performed by: ORTHOPAEDIC SURGERY

## 2024-10-09 PROCEDURE — 6360000002 HC RX W HCPCS: Performed by: PHYSICIAN ASSISTANT

## 2024-10-09 PROCEDURE — 87205 SMEAR GRAM STAIN: CPT

## 2024-10-09 PROCEDURE — 28122 PARTIAL REMOVAL OF FOOT BONE: CPT | Performed by: ORTHOPAEDIC SURGERY

## 2024-10-09 PROCEDURE — 87077 CULTURE AEROBIC IDENTIFY: CPT

## 2024-10-09 PROCEDURE — 87075 CULTR BACTERIA EXCEPT BLOOD: CPT

## 2024-10-09 PROCEDURE — 28124 PARTIAL REMOVAL OF TOE: CPT | Performed by: ORTHOPAEDIC SURGERY

## 2024-10-09 PROCEDURE — 6360000002 HC RX W HCPCS: Performed by: NURSE ANESTHETIST, CERTIFIED REGISTERED

## 2024-10-09 PROCEDURE — 2709999900 HC NON-CHARGEABLE SUPPLY: Performed by: ORTHOPAEDIC SURGERY

## 2024-10-09 PROCEDURE — 6360000002 HC RX W HCPCS: Performed by: ORTHOPAEDIC SURGERY

## 2024-10-09 PROCEDURE — C1713 ANCHOR/SCREW BN/BN,TIS/BN: HCPCS | Performed by: ORTHOPAEDIC SURGERY

## 2024-10-09 PROCEDURE — 87070 CULTURE OTHR SPECIMN AEROBIC: CPT

## 2024-10-09 PROCEDURE — 6370000000 HC RX 637 (ALT 250 FOR IP): Performed by: ANESTHESIOLOGY

## 2024-10-09 PROCEDURE — 11043 DBRDMT MUSC&/FSCA 1ST 20/<: CPT | Performed by: ORTHOPAEDIC SURGERY

## 2024-10-09 PROCEDURE — G0378 HOSPITAL OBSERVATION PER HR: HCPCS

## 2024-10-09 DEVICE — STIMULAN® RAPID CURE PROVIDED STERILE FOR SINGLE PATIENT USE. STIMULAN® RAPID CURE CONTAINS CALCIUM SULFATE POWDER AND MIXING SOLUTION IN PRE-MEASURED QUANTITIES SO THAT WHEN MIXED TOGETHER IN A STERILE MIXING BOWL, THE RESULTANT PASTE IS TO BE DIGITALLY PACKED INTO OPEN BONE VOID/GAP TO SET INSITU OR PLACED INTO THE MOULD PROVIDED, THE MIXTURE SETS TO FORM BEADS. THE BIODEGRADABLE, RADIOPAQUE BEADS ARE RESORBED IN APPROXIMATELY 30 – 60 DAYS WHEN USED IN ACCORDANCE WITH THE DEVICE LABELLING. STIMULAN® RAPID CURE IS MANUFACTURED FROM SYNTHETIC IMPLANT GRADE CALCIUM SULFATE DIHYDRATE(CASO4.2H2O) THAT RESORBS AND IS REPLACED WITH BONE DURING THE HEALING PROCESS. ALSO, AS THE BONE VOID FILLER BEADS ARE BIODEGRADABLE AND BIOCOMPATIBLE, THEY MAY BE USED AT AN INFECTED SITE.
Type: IMPLANTABLE DEVICE | Site: FOOT | Status: FUNCTIONAL
Brand: STIMULAN® RAPID CURE

## 2024-10-09 RX ORDER — IBUPROFEN 600 MG/1
1 TABLET ORAL PRN
Status: DISCONTINUED | OUTPATIENT
Start: 2024-10-09 | End: 2024-10-09 | Stop reason: HOSPADM

## 2024-10-09 RX ORDER — METOPROLOL SUCCINATE 50 MG/1
50 TABLET, EXTENDED RELEASE ORAL
Status: DISCONTINUED | OUTPATIENT
Start: 2024-10-09 | End: 2024-10-10 | Stop reason: HOSPADM

## 2024-10-09 RX ORDER — SODIUM CHLORIDE, SODIUM LACTATE, POTASSIUM CHLORIDE, CALCIUM CHLORIDE 600; 310; 30; 20 MG/100ML; MG/100ML; MG/100ML; MG/100ML
INJECTION, SOLUTION INTRAVENOUS CONTINUOUS
Status: DISCONTINUED | OUTPATIENT
Start: 2024-10-09 | End: 2024-10-09 | Stop reason: HOSPADM

## 2024-10-09 RX ORDER — PREGABALIN 75 MG/1
300 CAPSULE ORAL 2 TIMES DAILY
Status: DISCONTINUED | OUTPATIENT
Start: 2024-10-09 | End: 2024-10-10 | Stop reason: HOSPADM

## 2024-10-09 RX ORDER — DEXTROSE MONOHYDRATE 100 MG/ML
INJECTION, SOLUTION INTRAVENOUS CONTINUOUS PRN
Status: DISCONTINUED | OUTPATIENT
Start: 2024-10-09 | End: 2024-10-09 | Stop reason: HOSPADM

## 2024-10-09 RX ORDER — DIPHENHYDRAMINE HYDROCHLORIDE 50 MG/ML
12.5 INJECTION INTRAMUSCULAR; INTRAVENOUS
Status: DISCONTINUED | OUTPATIENT
Start: 2024-10-09 | End: 2024-10-09 | Stop reason: HOSPADM

## 2024-10-09 RX ORDER — MORPHINE SULFATE 2 MG/ML
2 INJECTION, SOLUTION INTRAMUSCULAR; INTRAVENOUS
Status: DISCONTINUED | OUTPATIENT
Start: 2024-10-09 | End: 2024-10-10 | Stop reason: HOSPADM

## 2024-10-09 RX ORDER — GLYCOPYRROLATE 0.2 MG/ML
INJECTION INTRAMUSCULAR; INTRAVENOUS
Status: DISCONTINUED | OUTPATIENT
Start: 2024-10-09 | End: 2024-10-09 | Stop reason: SDUPTHER

## 2024-10-09 RX ORDER — LIDOCAINE HYDROCHLORIDE 10 MG/ML
1 INJECTION, SOLUTION INFILTRATION; PERINEURAL
Status: DISCONTINUED | OUTPATIENT
Start: 2024-10-09 | End: 2024-10-09 | Stop reason: HOSPADM

## 2024-10-09 RX ORDER — ASPIRIN 81 MG/1
81 TABLET, CHEWABLE ORAL
Status: DISCONTINUED | OUTPATIENT
Start: 2024-10-09 | End: 2024-10-10 | Stop reason: HOSPADM

## 2024-10-09 RX ORDER — AMLODIPINE BESYLATE 10 MG/1
10 TABLET ORAL
Status: DISCONTINUED | OUTPATIENT
Start: 2024-10-09 | End: 2024-10-10 | Stop reason: HOSPADM

## 2024-10-09 RX ORDER — SODIUM CHLORIDE 0.9 % (FLUSH) 0.9 %
5-40 SYRINGE (ML) INJECTION PRN
Status: DISCONTINUED | OUTPATIENT
Start: 2024-10-09 | End: 2024-10-09 | Stop reason: HOSPADM

## 2024-10-09 RX ORDER — OXYCODONE HYDROCHLORIDE 5 MG/1
5 TABLET ORAL
Status: DISCONTINUED | OUTPATIENT
Start: 2024-10-09 | End: 2024-10-09 | Stop reason: HOSPADM

## 2024-10-09 RX ORDER — HYDROMORPHONE HYDROCHLORIDE 2 MG/ML
0.5 INJECTION, SOLUTION INTRAMUSCULAR; INTRAVENOUS; SUBCUTANEOUS EVERY 10 MIN PRN
Status: DISCONTINUED | OUTPATIENT
Start: 2024-10-09 | End: 2024-10-09 | Stop reason: HOSPADM

## 2024-10-09 RX ORDER — CETIRIZINE HYDROCHLORIDE 10 MG/1
10 TABLET ORAL NIGHTLY
Status: DISCONTINUED | OUTPATIENT
Start: 2024-10-09 | End: 2024-10-10 | Stop reason: HOSPADM

## 2024-10-09 RX ORDER — ONDANSETRON 2 MG/ML
4 INJECTION INTRAMUSCULAR; INTRAVENOUS EVERY 6 HOURS PRN
Status: DISCONTINUED | OUTPATIENT
Start: 2024-10-09 | End: 2024-10-10 | Stop reason: HOSPADM

## 2024-10-09 RX ORDER — POTASSIUM CHLORIDE 29.8 MG/ML
20 INJECTION INTRAVENOUS PRN
Status: DISCONTINUED | OUTPATIENT
Start: 2024-10-09 | End: 2024-10-10 | Stop reason: HOSPADM

## 2024-10-09 RX ORDER — PROPOFOL 10 MG/ML
INJECTION, EMULSION INTRAVENOUS
Status: DISCONTINUED | OUTPATIENT
Start: 2024-10-09 | End: 2024-10-09 | Stop reason: SDUPTHER

## 2024-10-09 RX ORDER — ROSUVASTATIN CALCIUM 20 MG/1
40 TABLET, COATED ORAL EVERY EVENING
Status: DISCONTINUED | OUTPATIENT
Start: 2024-10-09 | End: 2024-10-10 | Stop reason: HOSPADM

## 2024-10-09 RX ORDER — ROPIVACAINE HYDROCHLORIDE 5 MG/ML
INJECTION, SOLUTION EPIDURAL; INFILTRATION; PERINEURAL
Status: COMPLETED | OUTPATIENT
Start: 2024-10-09 | End: 2024-10-09

## 2024-10-09 RX ORDER — FENTANYL CITRATE 50 UG/ML
100 INJECTION, SOLUTION INTRAMUSCULAR; INTRAVENOUS
Status: COMPLETED | OUTPATIENT
Start: 2024-10-09 | End: 2024-10-09

## 2024-10-09 RX ORDER — ACETAMINOPHEN 325 MG/1
650 TABLET ORAL EVERY 6 HOURS
Status: DISCONTINUED | OUTPATIENT
Start: 2024-10-09 | End: 2024-10-10 | Stop reason: HOSPADM

## 2024-10-09 RX ORDER — OXYCODONE HYDROCHLORIDE 5 MG/1
10 TABLET ORAL EVERY 4 HOURS PRN
Status: DISCONTINUED | OUTPATIENT
Start: 2024-10-09 | End: 2024-10-10 | Stop reason: HOSPADM

## 2024-10-09 RX ORDER — NALOXONE HYDROCHLORIDE 0.4 MG/ML
INJECTION, SOLUTION INTRAMUSCULAR; INTRAVENOUS; SUBCUTANEOUS PRN
Status: DISCONTINUED | OUTPATIENT
Start: 2024-10-09 | End: 2024-10-09 | Stop reason: HOSPADM

## 2024-10-09 RX ORDER — SODIUM CHLORIDE 0.9 % (FLUSH) 0.9 %
5-40 SYRINGE (ML) INJECTION EVERY 12 HOURS SCHEDULED
Status: DISCONTINUED | OUTPATIENT
Start: 2024-10-09 | End: 2024-10-09 | Stop reason: HOSPADM

## 2024-10-09 RX ORDER — MORPHINE SULFATE 4 MG/ML
4 INJECTION, SOLUTION INTRAMUSCULAR; INTRAVENOUS
Status: DISCONTINUED | OUTPATIENT
Start: 2024-10-09 | End: 2024-10-10 | Stop reason: HOSPADM

## 2024-10-09 RX ORDER — IBUPROFEN 600 MG/1
1 TABLET ORAL PRN
Status: DISCONTINUED | OUTPATIENT
Start: 2024-10-09 | End: 2024-10-10 | Stop reason: HOSPADM

## 2024-10-09 RX ORDER — PANTOPRAZOLE SODIUM 40 MG/1
40 TABLET, DELAYED RELEASE ORAL
Status: DISCONTINUED | OUTPATIENT
Start: 2024-10-10 | End: 2024-10-10 | Stop reason: HOSPADM

## 2024-10-09 RX ORDER — DEXMEDETOMIDINE HYDROCHLORIDE 100 UG/ML
INJECTION, SOLUTION INTRAVENOUS
Status: DISCONTINUED | OUTPATIENT
Start: 2024-10-09 | End: 2024-10-09 | Stop reason: SDUPTHER

## 2024-10-09 RX ORDER — INSULIN LISPRO 100 [IU]/ML
0-8 INJECTION, SOLUTION INTRAVENOUS; SUBCUTANEOUS
Status: DISCONTINUED | OUTPATIENT
Start: 2024-10-09 | End: 2024-10-10 | Stop reason: HOSPADM

## 2024-10-09 RX ORDER — MONTELUKAST SODIUM 10 MG/1
10 TABLET ORAL NIGHTLY
Status: DISCONTINUED | OUTPATIENT
Start: 2024-10-09 | End: 2024-10-10 | Stop reason: HOSPADM

## 2024-10-09 RX ORDER — ONDANSETRON 4 MG/1
4 TABLET, FILM COATED ORAL DAILY PRN
Status: DISCONTINUED | OUTPATIENT
Start: 2024-10-09 | End: 2024-10-09

## 2024-10-09 RX ORDER — MIDAZOLAM HYDROCHLORIDE 2 MG/2ML
2 INJECTION, SOLUTION INTRAMUSCULAR; INTRAVENOUS
Status: COMPLETED | OUTPATIENT
Start: 2024-10-09 | End: 2024-10-09

## 2024-10-09 RX ORDER — PROCHLORPERAZINE EDISYLATE 5 MG/ML
5 INJECTION INTRAMUSCULAR; INTRAVENOUS
Status: DISCONTINUED | OUTPATIENT
Start: 2024-10-09 | End: 2024-10-09 | Stop reason: HOSPADM

## 2024-10-09 RX ORDER — POTASSIUM CHLORIDE 7.45 MG/ML
10 INJECTION INTRAVENOUS PRN
Status: DISCONTINUED | OUTPATIENT
Start: 2024-10-09 | End: 2024-10-10 | Stop reason: HOSPADM

## 2024-10-09 RX ORDER — MAGNESIUM SULFATE IN WATER 40 MG/ML
2000 INJECTION, SOLUTION INTRAVENOUS PRN
Status: DISCONTINUED | OUTPATIENT
Start: 2024-10-09 | End: 2024-10-10 | Stop reason: HOSPADM

## 2024-10-09 RX ORDER — ONDANSETRON 4 MG/1
4 TABLET, ORALLY DISINTEGRATING ORAL EVERY 8 HOURS PRN
Status: DISCONTINUED | OUTPATIENT
Start: 2024-10-09 | End: 2024-10-10 | Stop reason: HOSPADM

## 2024-10-09 RX ORDER — POLYETHYLENE GLYCOL 3350 17 G/17G
17 POWDER, FOR SOLUTION ORAL DAILY
Status: DISCONTINUED | OUTPATIENT
Start: 2024-10-09 | End: 2024-10-10 | Stop reason: HOSPADM

## 2024-10-09 RX ORDER — SODIUM CHLORIDE 9 MG/ML
INJECTION, SOLUTION INTRAVENOUS PRN
Status: DISCONTINUED | OUTPATIENT
Start: 2024-10-09 | End: 2024-10-09 | Stop reason: HOSPADM

## 2024-10-09 RX ORDER — OXYCODONE HYDROCHLORIDE 5 MG/1
5 TABLET ORAL EVERY 4 HOURS PRN
Status: DISCONTINUED | OUTPATIENT
Start: 2024-10-09 | End: 2024-10-10 | Stop reason: HOSPADM

## 2024-10-09 RX ORDER — LISINOPRIL 20 MG/1
40 TABLET ORAL
Status: DISCONTINUED | OUTPATIENT
Start: 2024-10-09 | End: 2024-10-10 | Stop reason: HOSPADM

## 2024-10-09 RX ORDER — LIDOCAINE HYDROCHLORIDE 20 MG/ML
INJECTION, SOLUTION EPIDURAL; INFILTRATION; INTRACAUDAL; PERINEURAL
Status: DISCONTINUED | OUTPATIENT
Start: 2024-10-09 | End: 2024-10-09 | Stop reason: SDUPTHER

## 2024-10-09 RX ORDER — NAPROXEN 250 MG/1
500 TABLET ORAL 2 TIMES DAILY WITH MEALS
Status: DISCONTINUED | OUTPATIENT
Start: 2024-10-09 | End: 2024-10-10 | Stop reason: HOSPADM

## 2024-10-09 RX ORDER — DOCUSATE SODIUM 100 MG/1
100 CAPSULE, LIQUID FILLED ORAL DAILY PRN
Status: DISCONTINUED | OUTPATIENT
Start: 2024-10-09 | End: 2024-10-10 | Stop reason: HOSPADM

## 2024-10-09 RX ORDER — DEXTROSE MONOHYDRATE 100 MG/ML
INJECTION, SOLUTION INTRAVENOUS CONTINUOUS PRN
Status: DISCONTINUED | OUTPATIENT
Start: 2024-10-09 | End: 2024-10-10 | Stop reason: HOSPADM

## 2024-10-09 RX ORDER — ACETAMINOPHEN 500 MG
1000 TABLET ORAL ONCE
Status: COMPLETED | OUTPATIENT
Start: 2024-10-09 | End: 2024-10-09

## 2024-10-09 RX ADMIN — CETIRIZINE HYDROCHLORIDE 10 MG: 10 TABLET, FILM COATED ORAL at 19:49

## 2024-10-09 RX ADMIN — CEFTRIAXONE SODIUM 2000 MG: 2 INJECTION, POWDER, FOR SOLUTION INTRAMUSCULAR; INTRAVENOUS at 19:55

## 2024-10-09 RX ADMIN — METOPROLOL SUCCINATE 50 MG: 50 TABLET, EXTENDED RELEASE ORAL at 19:49

## 2024-10-09 RX ADMIN — PROPOFOL 150 MCG/KG/MIN: 10 INJECTION, EMULSION INTRAVENOUS at 12:46

## 2024-10-09 RX ADMIN — PROPOFOL 80 MG: 10 INJECTION, EMULSION INTRAVENOUS at 12:45

## 2024-10-09 RX ADMIN — ASPIRIN 81 MG 81 MG: 81 TABLET ORAL at 19:48

## 2024-10-09 RX ADMIN — PREGABALIN 300 MG: 75 CAPSULE ORAL at 19:48

## 2024-10-09 RX ADMIN — ACETAMINOPHEN 650 MG: 325 TABLET ORAL at 19:57

## 2024-10-09 RX ADMIN — PHENYLEPHRINE HYDROCHLORIDE 100 MCG: 10 INJECTION INTRAVENOUS at 13:10

## 2024-10-09 RX ADMIN — Medication 3000 MG: at 12:48

## 2024-10-09 RX ADMIN — ACETAMINOPHEN 1000 MG: 500 TABLET, FILM COATED ORAL at 10:33

## 2024-10-09 RX ADMIN — LISINOPRIL 40 MG: 20 TABLET ORAL at 19:49

## 2024-10-09 RX ADMIN — DEXMEDETOMIDINE 12 MCG: 100 INJECTION, SOLUTION, CONCENTRATE INTRAVENOUS at 12:47

## 2024-10-09 RX ADMIN — GLYCOPYRROLATE 0.2 MG: 0.2 INJECTION INTRAMUSCULAR; INTRAVENOUS at 12:48

## 2024-10-09 RX ADMIN — Medication 30 ML: at 13:18

## 2024-10-09 RX ADMIN — FENTANYL CITRATE 100 MCG: 50 INJECTION INTRAMUSCULAR; INTRAVENOUS at 11:07

## 2024-10-09 RX ADMIN — SODIUM CHLORIDE 950 MG: 9 INJECTION INTRAMUSCULAR; INTRAVENOUS; SUBCUTANEOUS at 19:44

## 2024-10-09 RX ADMIN — Medication 50 ML: at 12:48

## 2024-10-09 RX ADMIN — MONTELUKAST 10 MG: 10 TABLET, FILM COATED ORAL at 19:49

## 2024-10-09 RX ADMIN — MIDAZOLAM 2 MG: 1 INJECTION INTRAMUSCULAR; INTRAVENOUS at 11:07

## 2024-10-09 RX ADMIN — LIDOCAINE HYDROCHLORIDE 100 MG: 20 INJECTION, SOLUTION EPIDURAL; INFILTRATION; INTRACAUDAL; PERINEURAL at 12:45

## 2024-10-09 RX ADMIN — DEXMEDETOMIDINE 8 MCG: 100 INJECTION, SOLUTION, CONCENTRATE INTRAVENOUS at 12:45

## 2024-10-09 RX ADMIN — ROPIVACAINE HYDROCHLORIDE 30 ML: 5 INJECTION, SOLUTION EPIDURAL; INFILTRATION; PERINEURAL at 11:07

## 2024-10-09 RX ADMIN — AMLODIPINE BESYLATE 10 MG: 10 TABLET ORAL at 19:50

## 2024-10-09 ASSESSMENT — LIFESTYLE VARIABLES: SMOKING_STATUS: 1

## 2024-10-09 ASSESSMENT — PAIN SCALES - GENERAL: PAINLEVEL_OUTOF10: 4

## 2024-10-09 ASSESSMENT — PAIN DESCRIPTION - ORIENTATION: ORIENTATION: RIGHT

## 2024-10-09 ASSESSMENT — PAIN DESCRIPTION - LOCATION: LOCATION: FOOT

## 2024-10-09 NOTE — CONSULTS
[facility] Hospitalist Consult   Admit Date:  10/9/2024  9:48 AM   Name:  Nilay Whittaker   Age:  53 y.o.  Sex:  male  :  1970   MRN:  [id number]   Room:  [facility]/    Presenting/Chief Complaint: No chief complaint on file.    Reason(s) for Admission: Pain in right foot [M79.671]  Status post foot surgery [Z98.890]  Diabetic infection of right foot (HCC) [E11.628, L08.9]     Hospitalists consulted by Juan Luis Bravo MD for: medical management    History of Presenting Illness:   Nilay Whittaker is a 53 y.o. male with history of hypertension, diabetes mellitus type 2, was admitted to orthopedic service for right chronic diabetic foot wound with exposed fifth metatarsal osteomyelitis.  Today's postop day 0 status post surgery.  Patient underwent bone excision with removal of infected tissue.  Intraoperative cultures were sent.  Patient is currently on ceftriaxone, daptomycin.  ID consulted for antibiotic recommendations.  Hospitalist consulted for medical management.    Subjective 10/9  Today's postop day 0 status post surgical excision with removal of infected tissue of the fifth metatarsal.  Patient denies any fever or chills.  No chest pain no shortness of breath.  No nausea no vomiting.  Blood glucose 183 this morning.      Assessment & Plan:     This is a 53-year-old male with    Right fifth metatarsal osteomyelitis/chronic diabetic foot wound  Management per primary orthopedics.  ID consulted.  Appreciate recommendations.  Patient is currently on ceftriaxone, daptomycin.  Check CPK.     Diabetes mellitus type 2  Continue Jardiance, metformin.  Blood glucoses fairly well-controlled.  Goal blood glucose 140-180.    Hypertension  Continue home medications amlodipine, lisinopril, metoprolol.    Anticipated Discharge Arrangements:   Home    PT/OT evals ordered?  Therapy evals ordered  Diet:  ADULT DIET; Regular  VTE prophylaxis: SCD's   Code status: Full Code    Non-peripheral Lines and Tubes

## 2024-10-09 NOTE — PROGRESS NOTES
Ortho plan of care    Right chronic diabetic foot wound with exposed 5th metatarsal osteomyelitis.  All infected tissue and bone was excised but he will need long term ABX    Heel WB   Admit for abx. Intra-op cultures x 2 taken. Await ID recs  ID consuled  Hospitalist consulted  Will d/c home once abx selection made

## 2024-10-09 NOTE — INTERVAL H&P NOTE
Update History & Physical    The Patient's History and Physical was reviewed   I discussed the surgery and patients medical condition with the patient.  The chart was reviewed with the patient and I examined the patient.    There was no change.  The surgical site was confirmed by the patient and me.    CV: RRR  RESP: CTAB    Plan:  The risk, benefits, expected outcome, and alternative to the recommended procedure have been discussed with the patient.  Patient understands and wants to proceed with the procedure.    Electronically signed by Juan Luis Bravo MD on 10/09/24 11:47 AM

## 2024-10-09 NOTE — PERIOP NOTE
TRANSFER - OUT REPORT:    Verbal report given to Ana ISLAS on Nilay Whittaker  being transferred to [facility] for routine post-op       Report consisted of patient’s Situation, Background, Assessment and   Recommendations(SBAR).     Information from the following report(s) Nurse Handoff Report and Event Log was reviewed with the receiving nurse.    Lines:   Peripheral IV 10/09/24 Left;Posterior Hand (Active)   Site Assessment Clean, dry & intact 10/09/24 1030   Line Status Blood return noted;Flushed;Normal saline locked 10/09/24 1030   Dressing Status Clean, dry & intact 10/09/24 1030   Dressing Type Transparent 10/09/24 1030        Opportunity for questions and clarification was provided.        VTE prophylaxis orders have been written for Nilay Whittaker.    Patient and family given floor number and nurses name.  Family updated re: pt status after security code verified.

## 2024-10-09 NOTE — ANESTHESIA POSTPROCEDURE EVALUATION
Department of Anesthesiology  Postprocedure Note    Patient: Nilya Whittaker  MRN: [id number]  YOB: 1970  Date of evaluation: 10/9/2024    Procedure Summary       Date: 10/09/24 Room / Location: [facility] OP OR 02 / [facility]    Anesthesia Start: 1239 Anesthesia Stop: 1336    Procedure: RIGHT 5th metatarsal head excision (Right: Foot) Diagnosis:       Pain in right foot      (Pain in right foot [M79.671])    Surgeons: Juan Luis Bravo MD Responsible Provider: Sunny Reyes MD    Anesthesia Type: TIVA ASA Status: 3            Anesthesia Type: No value filed.    Galo Phase I: Galo Score: 9    Galo Phase II:      Anesthesia Post Evaluation    Patient location during evaluation: PACU  Patient participation: complete - patient participated  Level of consciousness: awake and alert  Airway patency: patent  Nausea: well controlled.  Cardiovascular status: acceptable.  Respiratory status: acceptable  Hydration status: stable  Pain management: adequate    No notable events documented.

## 2024-10-09 NOTE — ANESTHESIA PROCEDURE NOTES
Peripheral Block    Patient location during procedure: pre-op  Reason for block: post-op pain management and at surgeon's request  Start time: 10/9/2024 11:07 AM  End time: 10/9/2024 11:12 AM  Staffing  Performed: anesthesiologist   Anesthesiologist: Sunny Reyes MD  Performed by: Sunny Reyes MD  Authorized by: Sunny Reyes MD    Preanesthetic Checklist  Completed: patient identified, IV checked, site marked, risks and benefits discussed, surgical/procedural consents, equipment checked, pre-op evaluation, timeout performed, anesthesia consent given, oxygen available and monitors applied/VS acknowledged  Peripheral Block   Patient position: sitting  Prep: ChloraPrep  Provider prep: mask and sterile gloves  Patient monitoring: continuous pulse ox, cardiac monitor, frequent blood pressure checks, IV access, oxygen and responsive to questions  Block type: Ankle  Laterality: right  Injection technique: single-shot  Guidance: other and Landmarks    Needle   Needle type: Other   Needle localization: anatomical landmarksOther needle type: 25G  Assessment   Injection assessment: negative aspiration for heme, no paresthesia on injection and no intravascular symptoms  Paresthesia pain: none  Slow fractionated injection: yes  Hemodynamics: stable  Outcomes: patient tolerated procedure well and uncomplicated    Medications Administered  ropivacaine (NAROPIN) injection 0.5% - Perineural   30 mL - 10/9/2024 11:07:00 AM

## 2024-10-09 NOTE — OP NOTE
Operative Note      Patient: Nilay Whittaker  YOB: 1970  MRN: [id number]    Date of Procedure: 10/9/2024    Pre-Op Diagnosis Codes:      *Right diabetic foot infection    Post-Op Diagnosis: Right diabetic foot infection       Procedure(s):  RIGHT 5th metatarsal bone excision  Right fifth phalangeal base excision  Debridement of right foot wound    Surgeon(s):  Juan Luis Bravo MD    Assistant:   * No surgical staff found *    Anesthesia: Monitor Anesthesia Care    Estimated Blood Loss (mL): less than 50     Complications: None    Specimens:   ID Type Source Tests Collected by Time Destination   1 : Deep Foot Wound #1 Swab Foot CULTURE, ANAEROBIC, CULTURE, WOUND Juan Luis Bravo MD 10/9/2024 1303    2 : Deep Foot Wound #2 Swab Foot CULTURE, ANAEROBIC, CULTURE, WOUND Juan Luis Bravo MD 10/9/2024 1305        Implants:  Implant Name Type Inv. Item Serial No.  Lot No. LRB No. Used Action   GRAFT BNE SUB 5CC CA SULF STIMULAN RAP CURE - [id number]  GRAFT BNE SUB 5CC CA SULF STIMULAN RAP CURE  [employer] [id number] Implanted         Drains: * No LDAs found *    Findings:  Infection Present At Time Of Surgery (PATOS) (choose all levels that have infection present):  - Deep Infection (muscle/fascia) present as evidenced by purulent fluid and exposed bone in the base of the fifth metatarsal ulceration.  This fifth metatarsal ulceration measured 2 cm x 2 centers by 1 cm.      Detailed Description of Procedure:   The patient was brought back to the operating room.  A timeout identify patient, laterality procedure performed.  After which they are prepped and draped in normal sterile fashion.  A subsequent timeout was taken identify patient, laterality procedure performed.    2 deep cultures were taken of the wound.    We identified the right foot.  The plantar aspect was identified at the fifth metatarsal base which there is a large 2 cm x 2 cm x 1 cm ulceration that tracked  down to the fifth metatarsal head.  There was obvious drainage and a foul odor there.  It was obviously infected.  I copiously irrigated out this wound.  Then using a 15 blade knife I completely excised skin, subcutaneous tissue, deep tissue all way down to the deep fascia and then excised a deep fascia.  I completely excised the wound and all necrotic tissue.  I then copiously irrigated out the wound.    I exposed the fifth metatarsal head.  Using a McGlamery retractor I reflected all the soft tissue off of it and then osteotome removed the fifth metatarsal at the level of the midshaft.  I copiously irrigated out the wound after the excision of the fifth metatarsal.    I then used a rongeur a 15 blade knife and a curette to continue debriding the infected and surrounding tissues.  I copiously irrigated the wound.  Then took vancomycin mixed with stimulant beads and placed it into the wound.  I then closed over a Penrose drain.    Soft dressing was then applied.    The patient was waken from anesthesia returned the postoperative recovery room without difficulty    Postoperative plan  Heel weightbearing only  Admit for postoperative antibiotics, cultures and sensitivity and specificity  Infectious ease consulted for antibiotic selection  Medicine consulted for diabetes management medicine medicine  Patient be discharged home as soon as antibiotics are finalized  Plan to pull drain on Friday    Electronically signed by Juan Luis Bravo MD on 10/9/2024 at 1:27 PM

## 2024-10-09 NOTE — PROGRESS NOTES
4 Eyes Skin Assessment     NAME:  Nilay Whittaker  YOB: 1970  MEDICAL RECORD NUMBER:  [id number]    The patient is being assessed for  Admission    I agree that at least one RN has performed a thorough Head to Toe Skin Assessment on the patient. ALL assessment sites listed below have been assessed.      Areas assessed by both nurses:    Head, Face, Ears, Shoulders, Back, Chest, Arms, Elbows, Hands, Sacrum. Buttock, Coccyx, Ischium, and Legs. Feet and Heels        Does the Patient have a Wound? No noted wound(s)       Marquis Prevention initiated by RN: Yes  Wound Care Orders initiated by RN: No    Pressure Injury (Stage 3,4, Unstageable, DTI, NWPT, and Complex wounds) if present, place Wound referral order by RN under : No    New Ostomies, if present place, Ostomy referral order under : No     Nurse 1 eSignature: Electronically signed by LILY ROA RN on 10/9/24 at 5:25 PM EDT    **SHARE this note so that the co-signing nurse can place an eSignature**    Nurse 2 eSignature: Electronically signed by NIGEL SCHULTE RN on 10/9/24 at 5:26 PM EDT

## 2024-10-10 VITALS
BODY MASS INDEX: 36.45 KG/M2 | SYSTOLIC BLOOD PRESSURE: 128 MMHG | DIASTOLIC BLOOD PRESSURE: 67 MMHG | HEIGHT: 78 IN | RESPIRATION RATE: 17 BRPM | HEART RATE: 59 BPM | TEMPERATURE: 97.3 F | WEIGHT: 315 LBS | OXYGEN SATURATION: 91 %

## 2024-10-10 LAB
ANION GAP SERPL CALC-SCNC: 9 MMOL/L (ref 9–18)
BASOPHILS # BLD: 0.1 K/UL (ref 0–0.2)
BASOPHILS NFR BLD: 1 % (ref 0–2)
BUN SERPL-MCNC: 14 MG/DL (ref 6–23)
CALCIUM SERPL-MCNC: 8.6 MG/DL (ref 8.8–10.2)
CHLORIDE SERPL-SCNC: 107 MMOL/L (ref 98–107)
CO2 SERPL-SCNC: 24 MMOL/L (ref 20–28)
CREAT SERPL-MCNC: 0.78 MG/DL (ref 0.8–1.3)
DIFFERENTIAL METHOD BLD: NORMAL
EOSINOPHIL # BLD: 0.2 K/UL (ref 0–0.8)
EOSINOPHIL NFR BLD: 2 % (ref 0.5–7.8)
ERYTHROCYTE [DISTWIDTH] IN BLOOD BY AUTOMATED COUNT: 14.4 % (ref 11.9–14.6)
GLUCOSE BLD STRIP.AUTO-MCNC: 160 MG/DL (ref 65–100)
GLUCOSE BLD STRIP.AUTO-MCNC: 219 MG/DL (ref 65–100)
GLUCOSE BLD STRIP.AUTO-MCNC: 320 MG/DL (ref 65–100)
GLUCOSE SERPL-MCNC: 125 MG/DL (ref 70–99)
HCT VFR BLD AUTO: 43.8 % (ref 41.1–50.3)
HGB BLD-MCNC: 14.4 G/DL (ref 13.6–17.2)
IMM GRANULOCYTES # BLD AUTO: 0.1 K/UL (ref 0–0.5)
IMM GRANULOCYTES NFR BLD AUTO: 1 % (ref 0–5)
LYMPHOCYTES # BLD: 3.2 K/UL (ref 0.5–4.6)
LYMPHOCYTES NFR BLD: 37 % (ref 13–44)
MCH RBC QN AUTO: 28.6 PG (ref 26.1–32.9)
MCHC RBC AUTO-ENTMCNC: 32.9 G/DL (ref 31.4–35)
MCV RBC AUTO: 86.9 FL (ref 82–102)
MONOCYTES # BLD: 0.8 K/UL (ref 0.1–1.3)
MONOCYTES NFR BLD: 9 % (ref 4–12)
NEUTS SEG # BLD: 4.4 K/UL (ref 1.7–8.2)
NEUTS SEG NFR BLD: 50 % (ref 43–78)
NRBC # BLD: 0 K/UL (ref 0–0.2)
PLATELET # BLD AUTO: 202 K/UL (ref 150–450)
PMV BLD AUTO: 10.5 FL (ref 9.4–12.3)
POTASSIUM SERPL-SCNC: 4.5 MMOL/L (ref 3.5–5.1)
RBC # BLD AUTO: 5.04 M/UL (ref 4.23–5.6)
SERVICE CMNT-IMP: ABNORMAL
SODIUM SERPL-SCNC: 140 MMOL/L (ref 136–145)
WBC # BLD AUTO: 8.7 K/UL (ref 4.3–11.1)

## 2024-10-10 PROCEDURE — 36415 COLL VENOUS BLD VENIPUNCTURE: CPT

## 2024-10-10 PROCEDURE — 6370000000 HC RX 637 (ALT 250 FOR IP): Performed by: ORTHOPAEDIC SURGERY

## 2024-10-10 PROCEDURE — 82962 GLUCOSE BLOOD TEST: CPT

## 2024-10-10 PROCEDURE — G0378 HOSPITAL OBSERVATION PER HR: HCPCS

## 2024-10-10 PROCEDURE — 80048 BASIC METABOLIC PNL TOTAL CA: CPT

## 2024-10-10 PROCEDURE — 97165 OT EVAL LOW COMPLEX 30 MIN: CPT

## 2024-10-10 PROCEDURE — 85025 COMPLETE CBC W/AUTO DIFF WBC: CPT

## 2024-10-10 PROCEDURE — 97161 PT EVAL LOW COMPLEX 20 MIN: CPT

## 2024-10-10 PROCEDURE — 97530 THERAPEUTIC ACTIVITIES: CPT

## 2024-10-10 PROCEDURE — 6370000000 HC RX 637 (ALT 250 FOR IP): Performed by: HOSPITALIST

## 2024-10-10 RX ORDER — LINEZOLID 600 MG/1
600 TABLET, FILM COATED ORAL EVERY 12 HOURS SCHEDULED
Qty: 14 TABLET | Refills: 0 | Status: SHIPPED | OUTPATIENT
Start: 2024-10-10 | End: 2024-10-17

## 2024-10-10 RX ORDER — LINEZOLID 600 MG/1
600 TABLET, FILM COATED ORAL EVERY 12 HOURS SCHEDULED
Status: DISCONTINUED | OUTPATIENT
Start: 2024-10-10 | End: 2024-10-10 | Stop reason: HOSPADM

## 2024-10-10 RX ORDER — CIPROFLOXACIN 750 MG/1
750 TABLET, FILM COATED ORAL EVERY 12 HOURS SCHEDULED
Qty: 14 TABLET | Refills: 0 | Status: SHIPPED | OUTPATIENT
Start: 2024-10-10 | End: 2024-10-17

## 2024-10-10 RX ORDER — CIPROFLOXACIN 500 MG/1
750 TABLET, FILM COATED ORAL EVERY 12 HOURS SCHEDULED
Status: DISCONTINUED | OUTPATIENT
Start: 2024-10-10 | End: 2024-10-10 | Stop reason: HOSPADM

## 2024-10-10 RX ADMIN — METFORMIN HYDROCHLORIDE 1000 MG: 500 TABLET ORAL at 08:34

## 2024-10-10 RX ADMIN — INSULIN LISPRO 2 UNITS: 100 INJECTION, SOLUTION INTRAVENOUS; SUBCUTANEOUS at 11:25

## 2024-10-10 RX ADMIN — PREGABALIN 300 MG: 75 CAPSULE ORAL at 08:34

## 2024-10-10 RX ADMIN — ACETAMINOPHEN 650 MG: 325 TABLET ORAL at 13:47

## 2024-10-10 RX ADMIN — NAPROXEN 500 MG: 250 TABLET ORAL at 08:34

## 2024-10-10 RX ADMIN — INSULIN LISPRO 6 UNITS: 100 INJECTION, SOLUTION INTRAVENOUS; SUBCUTANEOUS at 16:43

## 2024-10-10 RX ADMIN — METFORMIN HYDROCHLORIDE 1000 MG: 500 TABLET ORAL at 16:43

## 2024-10-10 RX ADMIN — NAPROXEN 500 MG: 250 TABLET ORAL at 16:43

## 2024-10-10 RX ADMIN — ACETAMINOPHEN 650 MG: 325 TABLET ORAL at 08:34

## 2024-10-10 RX ADMIN — PANTOPRAZOLE SODIUM 40 MG: 40 TABLET, DELAYED RELEASE ORAL at 06:00

## 2024-10-10 RX ADMIN — ACETAMINOPHEN 650 MG: 325 TABLET ORAL at 02:05

## 2024-10-10 NOTE — CARE COORDINATION
Chart reviewed by  for potential transition of care (KARTHIK) needs or concerns.  Pt is insured with pharmacy benefits and is established with a PCP. Therapy evals complete with no further therapy needed at dc.  Pt will dc on oral ABX.  No indication for HH SN.  CM anticipates pt will dc home when medically ready with no KARTHIK needs.  CM remains available to assist if needed.  Please notify/consult  if KARTHIK needs arise.       10/10/24 6023   Service Assessment   Patient Orientation Alert and Oriented   Cognition Alert   History Provided By Medical Record   Primary Caregiver Self   Support Systems Spouse/Significant Other   Patient's Healthcare Decision Maker is: Legal Next of Kin   PCP Verified by CM Yes   Last Visit to PCP Within last 3 months  (9/25/2024)   Prior Functional Level Independent in ADLs/IADLs   Current Functional Level Independent in ADLs/IADLs   Can patient return to prior living arrangement Yes   Ability to make needs known: Good   Family able to assist with home care needs: Yes   Would you like for me to discuss the discharge plan with any other family members/significant others, and if so, who? No   Financial Resources Other (Comment)  ([employer] commercial policy)   Community Resources None   Social/Functional History   Lives With Spouse   ADL Assistance Independent   Homemaking Assistance Independent   Ambulation Assistance Independent   Transfer Assistance Independent   Active  Yes   Occupation Full time employment   Type of Occupation    Discharge Planning   Type of Residence House   Current Services Prior To Admission None   Potential Assistance Needed N/A   DME Ordered? No   Potential Assistance Purchasing Medications No   Type of Home Care Services None   Patient expects to be discharged to: House   Services At/After Discharge   Transition of Care Consult (CM Consult) N/A  (no CM consult received)   Services At/After Discharge None    Resource

## 2024-10-10 NOTE — CONSULTS
\"TBILI\", \"SGOT\", \"AP\", \"ALB\"    Data Review:   Recent Results (from the past 24 hour(s))   POCT Glucose    Collection Time: 10/09/24 10:19 AM   Result Value Ref Range    POC Glucose 183 (H) 65 - 100 mg/dL    Performed by: Walter    Culture, Wound    Collection Time: 10/09/24  1:03 PM    Specimen: Wound    FOOT   Result Value Ref Range    Special Requests NO SPECIAL REQUESTS      Gram Stain FEW WBCS SEEN      Gram Stain FEW Gram positive cocci      Gram Stain FEW Gram negative rods      Culture (A)       MODERATE Gram negative rods For identification and susceptibility refer to culture [id number]    Culture (A)       LIGHT Staphylococcus aureus For Susceptibility Refer to Culture [id number]    Culture LIGHT MIXED SKIN YOHANA ISOLATED     Culture, Wound    Collection Time: 10/09/24  1:30 PM    Specimen: Wound    FOOT   Result Value Ref Range    Special Requests NO SPECIAL REQUESTS      Gram Stain FEW WBCS SEEN      Gram Stain FEW Gram positive cocci      Gram Stain FEW Gram negative rods      Culture MODERATE Gram negative rods (A)      Culture SUBCULTURE IN PROGRESS      Culture (A)       MODERATE Staphylococcus aureus SUBCULTURE IN PROGRESS    Culture LIGHT MIXED SKIN YOHANA ISOLATED     CK    Collection Time: 10/09/24  6:13 PM   Result Value Ref Range    Total  (H) 21 - 215 U/L   POCT Glucose    Collection Time: 10/09/24  7:10 PM   Result Value Ref Range    POC Glucose 224 (H) 65 - 100 mg/dL    Performed by: Dino    Basic Metabolic Panel w/ Reflex to MG    Collection Time: 10/10/24  4:14 AM   Result Value Ref Range    Sodium 140 136 - 145 mmol/L    Potassium 4.5 3.5 - 5.1 mmol/L    Chloride 107 98 - 107 mmol/L    CO2 24 20 - 28 mmol/L    Anion Gap 9 9 - 18 mmol/L    Glucose 125 (H) 70 - 99 mg/dL    BUN 14 6 - 23 MG/DL    Creatinine 0.78 (L) 0.80 - 1.30 MG/DL    Est, Glom Filt Rate >90 >60 ml/min/1.73m2    Calcium 8.6 (L) 8.8 - 10.2 MG/DL   CBC with Auto Differential    Collection Time:  10/10/24  4:14 AM   Result Value Ref Range    WBC 8.7 4.3 - 11.1 K/uL    RBC 5.04 4.23 - 5.6 M/uL    Hemoglobin 14.4 13.6 - 17.2 g/dL    Hematocrit 43.8 41.1 - 50.3 %    MCV 86.9 82 - 102 FL    MCH 28.6 26.1 - 32.9 PG    MCHC 32.9 31.4 - 35.0 g/dL    RDW 14.4 11.9 - 14.6 %    Platelets 202 150 - 450 K/uL    MPV 10.5 9.4 - 12.3 FL    nRBC 0.00 0.0 - 0.2 K/uL    Differential Type AUTOMATED      Neutrophils % 50 43 - 78 %    Lymphocytes % 37 13 - 44 %    Monocytes % 9 4.0 - 12.0 %    Eosinophils % 2 0.5 - 7.8 %    Basophils % 1 0.0 - 2.0 %    Immature Granulocytes % 1 0.0 - 5.0 %    Neutrophils Absolute 4.4 1.7 - 8.2 K/UL    Lymphocytes Absolute 3.2 0.5 - 4.6 K/UL    Monocytes Absolute 0.8 0.1 - 1.3 K/UL    Eosinophils Absolute 0.2 0.0 - 0.8 K/UL    Basophils Absolute 0.1 0.0 - 0.2 K/UL    Immature Granulocytes Absolute 0.1 0.0 - 0.5 K/UL   POCT Glucose    Collection Time: 10/10/24  6:03 AM   Result Value Ref Range    POC Glucose 160 (H) 65 - 100 mg/dL    Performed by: Dino         Microbiology:  Reviewed    Studies:  Reviewed    Signed By: Esvin Alex, APRN - CNP     October 10, 2024

## 2024-10-10 NOTE — PROGRESS NOTES
ACUTE PHYSICAL THERAPY GOALS:   (Developed with and agreed upon by patient and/or caregiver.)    (1.) Nilay Whittaker will transfer from bed to chair and chair to bed with INDEPENDENCE using the least restrictive device within 7 treatment day(s).    (2.) Nilay Whittaker will ambulate with INDEPENDENCE for 500 feet with the least restrictive device within 7 treatment day(s).   (3.) Nilay Whittaker will perform standing static and dynamic balance activities x 20 minutes with SUPERVISION to improve safety within 7 treatment day(s).  (4.) Nilay Whittaker will ascend and descend 2 stairs using 1 hand rail(s) with SUPERVISION to improve functional mobility and safety within 7 treatment day(s).    PHYSICAL THERAPY Initial Assessment, Daily Note, and AM  (Link to Caseload Tracking: PT Visit Days : 1  Acknowledge Orders  Time In/Out  PT Charge Capture  Rehab Caseload Tracker    R LE HEEL WB    Nilay Whittaker is a 53 y.o. male   PRIMARY DIAGNOSIS: Pain in right foot  Pain in right foot [M79.671]  Status post foot surgery [Z98.890]  Diabetic infection of right foot (HCC) [E11.628, L08.9]  Procedure(s) (LRB):  RIGHT 5th metatarsal head excision (Right)  1 Day Post-Op  Reason for Referral: Pain in Right Ankle and Joints of Right Foot (M25.571)  Difficulty in walking, Not elsewhere classified (R26.2)  Observation: Payor: DANIEL / Plan: [employer] NAP CHOICE POS II / Product Type: *No Product type* /     ASSESSMENT:     REHAB RECOMMENDATIONS:   Recommendation to date pending progress:  Setting:  No further skilled physical therapy after discharge from hospital    Equipment:    To Be Determined     ASSESSMENT:  Mr. Whittaker  is a 53 year old M who presents s/p R 5th toe metatarsal excision POD 1. At baseline, pt is independent with ADL's and mobility. Reports his toe has been bothering him for the last two months but he has not required AD use but owns a cane. This date pt performs mobility    FREQUENCY AND DURATION: 3 times/week for duration of hospital stay or until stated goals are met, whichever comes first.    THERAPY PROGNOSIS: Good    PROBLEM LIST:   (Skilled intervention is medically necessary to address:)  Decreased Activity Tolerance  Decreased AROM/PROM  Decreased Balance  Decreased Gait Ability  Decreased Strength  Increased Pain INTERVENTIONS PLANNED:   (Benefits and precautions of physical therapy have been discussed with the patient.)  Therapeutic Activity  Therapeutic Exercise/HEP  Neuromuscular Re-education  Gait Training  Education       TREATMENT:   EVALUATION: LOW COMPLEXITY: (Untimed Charge)  The initial evaluation charge encompasses clinical chart review, objective assessment, interpretation of assessment, and skilled monitoring of the patient's response to treatment in order to develop a plan of care.     TREATMENT:   Therapeutic Activity (8 Minutes): Therapeutic activity included Rolling, Supine to Sit, Scooting, Transfer Training, Ambulation on level ground, Sitting balance , and Standing balance to improve functional Activity tolerance, Balance, Mobility, and Strength.    TREATMENT GRID:  N/A    AFTER TREATMENT PRECAUTIONS: Bed/Chair Locked, Call light within reach, Chair, Needs within reach, and RN notified    INTERDISCIPLINARY COLLABORATION:  RN/ PCT    EDUCATION: Education Given To: Patient  Education Provided: Role of Therapy;Plan of Care;Precautions;Fall Prevention Strategies  Education Method: Verbal  Barriers to Learning: None  Education Outcome: Verbalized understanding    TIME IN/OUT:  Time In: 0837  Time Out: 0853  Minutes: 16    SAMY ANN PT

## 2024-10-10 NOTE — PROGRESS NOTES
[facility] Hospitalist progress note   Admit Date:  10/9/2024  9:48 AM   Name:  Nilay Whittaker   Age:  53 y.o.  Sex:  male  :  1970   MRN:  [id number]   Room:  [facility]/    Presenting/Chief Complaint: No chief complaint on file.    Reason(s) for Admission: Pain in right foot [M79.671]  Status post foot surgery [Z98.890]  Diabetic infection of right foot (HCC) [E11.628, L08.9]     Hospitalists consulted by Juan Luis Bravo MD for: medical management    History of Presenting Illness:   Nilay Whittaker is a 53 y.o. male with history of hypertension, diabetes mellitus type 2, was admitted to orthopedic service for right chronic diabetic foot wound with exposed fifth metatarsal osteomyelitis. Patient underwent bone excision with removal of infected tissue.  Intraoperative cultures were sent.  Patient is currently on ceftriaxone, daptomycin.  ID consulted for antibiotic recommendations.  Hospitalist consulted for medical management.    Subjective 10/10  Patient is seen and examined at the bedside.  No acute event reported overnight.  Reports that he is feeling much better.  Pain optimally controlled.  Wants to go home.  Denies chest pain, palpitation, nausea, vomiting or abdominal pain.  Denies shortness of breath.      Fasting blood glucose 125.      Assessment & Plan:     Right fifth metatarsal osteomyelitis/chronic diabetic foot wound  Management per primary orthopedics.  ID consulted.  Appreciate recommendations.  Patient is currently on ceftriaxone, daptomycin    Diabetes mellitus type 2  Continue Jardiance, metformin.  Blood glucoses fairly well-controlled.  Goal blood glucose 140-180.    Hypertension  Continue home medications amlodipine, lisinopril, metoprolol.    Anticipated Discharge Arrangements:   Home    PT/OT evals ordered?  Therapy evals ordered  Diet:  ADULT DIET; Regular; 4 carb choices (60 gm/meal)  VTE prophylaxis: SCD's   Code status: Full Code    Non-peripheral Lines and Tubes

## 2024-10-10 NOTE — PROGRESS NOTES
[facility]        October 10, 2024         Post Op day: 1 Day Post-Op Procedure(s) (LRB):  RIGHT 5th metatarsal head excision (Right)      Admit Date: 10/9/2024  Admit Diagnosis: Pain in right foot [M79.671]  Status post foot surgery [Z98.890]  Diabetic infection of right foot (HCC) [E11.628, L08.9]       Principle Problem: Pain in right foot.           Subjective: Doing well, No complaints, No SOB, No Chest Pain, No Nausea or Vomiting     Objective:   Vital Signs are Stable, No Acute Distress, Alert,  Dressing is Dry,  Neurovascular exam is normal.     Assessment / Plan :  Patient Active Problem List   Diagnosis    HTN (hypertension)    Status post total knee replacement    HLD (hyperlipidemia)    Cellulitis    Osteomyelitis of fifth toe of left foot    Amputation of fifth toe of left foot (HCC)    Pyogenic inflammation of bone    Pain in right foot    Status post foot surgery    Diabetic infection of right foot (HCC)    Patient Vitals for the past 8 hrs:   BP Temp Temp src Pulse Resp SpO2   10/10/24 0830 -- -- -- -- -- 91 %   10/10/24 0718 128/67 97.3 °F (36.3 °C) Oral 59 17 (!) 88 %   10/10/24 0625 114/68 97.8 °F (36.6 °C) Oral 55 16 (!) 87 %    Temp (24hrs), Av.5 °F (36.4 °C), Min:97.3 °F (36.3 °C), Max:97.8 °F (36.6 °C)    Body mass index is 38.45 kg/m².    Lab Results   Component Value Date/Time    HGB 14.4 10/10/2024 04:14 AM          S/P Procedure(s) (LRB):  RIGHT 5th metatarsal head excision (Right)      Medical Mgmt per hospitalist  ID following- awaiting cultures   Continue PT/OT as ordered- Heel weight bearing only  Fall Precautions  DC disp: Home on abx therapy  F/U: 2 weeks postop for wound check and suture removal with Bravo Team        Signed By: Cordelia Velasquez, GRAY - DEBORAH  10/10/2024,  2:13 PM

## 2024-10-10 NOTE — PROGRESS NOTES
Discharge instructions and prescriptions provided and explained to the pt.  Opportunity for questions provided.  Instructed to call once ready to leave.

## 2024-10-10 NOTE — PROGRESS NOTES
I spoke with the infectious disease team today.  They are okay discharging him home on p.o. antibiotics and having him follow-up.  I am okay with that as well.  Unfortunately there is a drain in place that will need to remain until tomorrow.  Tomorrow morning we will pull the drain and discharge him

## 2024-10-10 NOTE — PROGRESS NOTES
I spoke with the patient on the phone.  I will d/c home tonight  He will come to my office tomorrow for drain tomorrow.    Pt is eager to go home but culture/sensitivity will not be available til this weekend. Okay to discharge on Cipro 750mg po BID and Linezolid 600mg po BID x1 week and ID will follow up on culture result and make adjustment as needed.

## 2024-10-10 NOTE — PROGRESS NOTES
OCCUPATIONAL THERAPY Initial Assessment and Discharge          Acknowledge Orders  Time  OT Charge Capture  Rehab Caseload Tracker      Nilay Whittaker is a 53 y.o. male   PRIMARY DIAGNOSIS: Pain in right foot  Pain in right foot [M79.671]  Status post foot surgery [Z98.890]  Diabetic infection of right foot (HCC) [E11.628, L08.9]  Procedure(s) (LRB):  RIGHT 5th metatarsal head excision (Right)  1 Day Post-Op  Reason for Referral: Other lack of cordination (R27.8)  Observation: Payor: [employer] / Plan: [employer] NAP CHOICE POS II / Product Type: *No Product type* /     ASSESSMENT:     REHAB RECOMMENDATIONS:   Recommendation to date pending progress:  Setting:  No further skilled occupational therapy after discharge from hospital    Equipment:     Shower chair     ASSESSMENT:  Mr. Whittaker was admitted w/ R foot osteomyelitis 5th metatarsal, post op bone excision and removal of infected tissue. RLE heel WB only. Pt remains independent with ADLs and with functional mobility for ADLs while maintaining heel WB. Pt does not require further skilled OT services at this time, no d/c needs.      [facility]™ “6 Clicks” Daily Activity Inpatient Short Form:    AM-PAC Daily Activity - Inpatient   How much help is needed for putting on and taking off regular lower body clothing?: None  How much help is needed for bathing (which includes washing, rinsing, drying)?: None  How much help is needed for toileting (which includes using toilet, bedpan, or urinal)?: None  How much help is needed for putting on and taking off regular upper body clothing?: None  How much help is needed for taking care of personal grooming?: None  How much help for eating meals?: None  AM-[facility] Inpatient Daily Activity Raw Score: 24  AM-PAC Inpatient ADL T-Scale Score : 57.54  ADL Inpatient CMS 0-100% Score: 0  ADL Inpatient CMS G-Code Modifier : CH           SUBJECTIVE:     Mr. Whittaker states, \"I don't need a walker.\"

## 2024-10-11 ENCOUNTER — OFFICE VISIT (OUTPATIENT)
Dept: ORTHOPEDIC SURGERY | Age: 54
End: 2024-10-11

## 2024-10-11 ENCOUNTER — CLINICAL DOCUMENTATION (OUTPATIENT)
Dept: ORTHOPEDIC SURGERY | Age: 54
End: 2024-10-11

## 2024-10-11 DIAGNOSIS — L03.032 CELLULITIS OF TOE OF LEFT FOOT: Primary | ICD-10-CM

## 2024-10-11 PROCEDURE — 99024 POSTOP FOLLOW-UP VISIT: CPT | Performed by: ORTHOPAEDIC SURGERY

## 2024-10-11 NOTE — PROGRESS NOTES
Name: Nilay Whittaker  YOB: 1970  Gender: male  MRN: [id number]    Plan:    Drain pull.  No signs of ascending infection.  Continue taking Zyvox and Cipro as directed by the infectious the specialist.  He will follow-up with infectious disease specialist to get set up for long-term antibiotics.    Heel weightbearing only    Follow-up in 2 weeks for wound check         Procedure: RIGHT 5th metatarsal head excision - Right    Surgery Date: 10/9/2024  I personally discussed with the infectious the specialist.  They recommend discharging him home on p.o. antibiotics and having him follow-up with him in the next week after if cultures are finalized.  Then at this point they would discuss long-term antibiotic treatment.    Subjective: Denies fevers chills or infection.  Denies any signs of spreading erythema.  He was discharged yesterday.  Denies fevers chills.    ROS: Patient Denies fever/chills, headache, visual changes, chest pain, shortness of breath, and nausea/vomiting/diarrhea     Exam:     Painful.  Wound was good.  No signs of ascending infection.    Results       Procedure Component Value Units Date/Time    Culture, Anaerobic [[id number]] Collected: 10/09/24 1330    Order Status: Sent Specimen: Surgical Updated: 10/09/24 1427    Culture, Wound [[id number]]  (Abnormal) Collected: 10/09/24 1330    Order Status: Completed Specimen: Wound Updated: 10/11/24 0743     Special Requests NO SPECIAL REQUESTS        Gram Stain FEW WBCS SEEN         FEW Gram positive cocci         FEW Gram negative rods        Culture       MODERATE Gram negative rods                  IDENTIFICATION AND SUSCEPTIBILITY TO FOLLOW                  MODERATE Staphylococcus aureus SENSITIVITY TO FOLLOW                  LIGHT PRESUMPTIVE ENTEROCOCCUS SPECIES SUBCULTURE IN PROGRESS                  SCANT MIXED SKIN YOHANA ISOLATED          Culture, Wound [[id number]]  (Abnormal) Collected: 10/09/24 1303    Order Status:

## 2024-10-11 NOTE — PROGRESS NOTES
Patient called in with questions about his return to work date on his fmla form. He has the forms but there is not a copy in the system.

## 2024-10-13 LAB
BACTERIA SPEC CULT: ABNORMAL
GRAM STN SPEC: ABNORMAL
SERVICE CMNT-IMP: ABNORMAL
SERVICE CMNT-IMP: ABNORMAL

## 2024-10-24 ENCOUNTER — OFFICE VISIT (OUTPATIENT)
Dept: ORTHOPEDIC SURGERY | Age: 54
End: 2024-10-24
Payer: COMMERCIAL

## 2024-10-24 DIAGNOSIS — S98.132A AMPUTATION OF FIFTH TOE OF LEFT FOOT (HCC): Primary | ICD-10-CM

## 2024-10-24 PROCEDURE — 99213 OFFICE O/P EST LOW 20 MIN: CPT | Performed by: ORTHOPAEDIC SURGERY

## 2024-10-24 NOTE — PROGRESS NOTES
Name: Nilay Whittaker  YOB: 1970  Gender: male  MRN: [id number]    Plan:    Follow-up in 2 weeks we will check the remaining suture excision         Procedure: RIGHT 5th metatarsal head excision - Right    Surgery Date: 10/9/2024      Subjective: Denies fevers chills or infection.  Denies any signs of spreading erythema.  He has been walking without much of an issue.  He completed his Dalvance infusions.  Denies fevers chills    ROS: Patient Denies fever/chills, headache, visual changes, chest pain, shortness of breath, and nausea/vomiting/diarrhea     Exam:     Wound is healing very well.  Since it is the plantar aspect of the foot it is not completely epithelialized and there is still a top layer of dead skin.  Some of the sutures removed and some left intact    Imaging:   No imaging reviewed

## 2024-10-25 ENCOUNTER — TELEPHONE (OUTPATIENT)
Dept: ORTHOPEDIC SURGERY | Age: 54
End: 2024-10-25

## 2024-10-30 ENCOUNTER — TELEPHONE (OUTPATIENT)
Dept: ORTHOPEDIC SURGERY | Age: 54
End: 2024-10-30

## 2024-10-30 NOTE — TELEPHONE ENCOUNTER
Patient would like for you to give him a call. Patient said the area where the stitches were taking out is swollen and also the other stitches look like they are about to bust out. Patient would like to come in this week.

## 2024-11-14 ENCOUNTER — OFFICE VISIT (OUTPATIENT)
Dept: ORTHOPEDIC SURGERY | Age: 54
End: 2024-11-14

## 2024-11-14 ENCOUNTER — TELEPHONE (OUTPATIENT)
Dept: ORTHOPEDIC SURGERY | Age: 54
End: 2024-11-14

## 2024-11-14 DIAGNOSIS — Z51.89 ENCOUNTER FOR WOUND CARE: Primary | ICD-10-CM

## 2024-11-14 PROCEDURE — 99024 POSTOP FOLLOW-UP VISIT: CPT | Performed by: ORTHOPAEDIC SURGERY

## 2024-11-14 NOTE — PROGRESS NOTES
Name: Nilay Whittaker  YOB: 1970  Gender: male  MRN: [id number]    Plan:  The wound is dehisced.  I see no signs of infection.  The tissue is bleeding but there is just the dehisced plantar wound.  We will get him into wound care  Continue antibiotics per infectious disease recommendations  Dress wound with Xeroform 4 x 4's and Ace wrap until he sees wound care    Sitting work only until December 15    Follow-up in 1 month         Procedure: RIGHT 5th metatarsal head excision - Right    Surgery Date: 10/9/2024      Subjective: Denies fevers chills or infection.  Denies any signs of spreading erythema.  He has been walking without much of an issue.  He completed his Dalvance infusions.  Denies fevers chills    ROS: Patient Denies fever/chills, headache, visual changes, chest pain, shortness of breath, and nausea/vomiting/diarrhea     Exam:     Wound has opened up  All sutures removed  No infection but lots to dead skin I debrided.  Wound does not probe deep but there is good bleeding granulation tissue.     Imaging:   No imaging reviewed

## 2024-11-21 ENCOUNTER — HOSPITAL ENCOUNTER (OUTPATIENT)
Dept: WOUND CARE | Age: 54
Discharge: HOME | End: 2024-11-21
Payer: COMMERCIAL

## 2024-11-21 VITALS — HEIGHT: 78 IN | BODY MASS INDEX: 36.45 KG/M2 | WEIGHT: 315 LBS

## 2024-11-21 DIAGNOSIS — F17.200 TOBACCO USE DISORDER: ICD-10-CM

## 2024-11-21 DIAGNOSIS — L84 CALLUS OF FOOT: ICD-10-CM

## 2024-11-21 DIAGNOSIS — Z71.6 TOBACCO ABUSE COUNSELING: ICD-10-CM

## 2024-11-21 DIAGNOSIS — M86.171 ACUTE OSTEOMYELITIS OF RIGHT FOOT: ICD-10-CM

## 2024-11-21 DIAGNOSIS — E11.42 DIABETIC POLYNEUROPATHY ASSOCIATED WITH TYPE 2 DIABETES MELLITUS (HCC): ICD-10-CM

## 2024-11-21 DIAGNOSIS — E11.628 TYPE 2 DIABETES MELLITUS WITH OTHER SKIN COMPLICATION, WITHOUT LONG-TERM CURRENT USE OF INSULIN (HCC): ICD-10-CM

## 2024-11-21 DIAGNOSIS — T81.31XA POSTOPERATIVE WOUND DEHISCENCE, INITIAL ENCOUNTER: Primary | ICD-10-CM

## 2024-11-21 PROBLEM — Z98.890 STATUS POST FOOT SURGERY: Status: RESOLVED | Noted: 2024-10-09 | Resolved: 2024-11-21

## 2024-11-21 PROBLEM — E11.9 DIABETES MELLITUS (HCC): Status: ACTIVE | Noted: 2024-11-21

## 2024-11-21 PROBLEM — L03.90 CELLULITIS: Status: RESOLVED | Noted: 2024-05-17 | Resolved: 2024-11-21

## 2024-11-21 PROBLEM — L08.9 DIABETIC INFECTION OF RIGHT FOOT (HCC): Status: RESOLVED | Noted: 2024-10-09 | Resolved: 2024-11-21

## 2024-11-21 PROBLEM — M79.671 PAIN IN RIGHT FOOT: Status: RESOLVED | Noted: 2024-09-26 | Resolved: 2024-11-21

## 2024-11-21 PROBLEM — M86.9 PYOGENIC INFLAMMATION OF BONE: Status: RESOLVED | Noted: 2024-09-23 | Resolved: 2024-11-21

## 2024-11-21 PROCEDURE — 99213 OFFICE O/P EST LOW 20 MIN: CPT

## 2024-11-21 PROCEDURE — 11042 DBRDMT SUBQ TIS 1ST 20SQCM/<: CPT

## 2024-11-21 RX ORDER — CLOBETASOL PROPIONATE 0.5 MG/G
OINTMENT TOPICAL ONCE
OUTPATIENT
Start: 2024-11-21 | End: 2024-11-21

## 2024-11-21 RX ORDER — BACITRACIN ZINC AND POLYMYXIN B SULFATE 500; 1000 [USP'U]/G; [USP'U]/G
OINTMENT TOPICAL ONCE
OUTPATIENT
Start: 2024-11-21 | End: 2024-11-21

## 2024-11-21 RX ORDER — MUPIROCIN 20 MG/G
OINTMENT TOPICAL ONCE
OUTPATIENT
Start: 2024-11-21 | End: 2024-11-21

## 2024-11-21 RX ORDER — LIDOCAINE HYDROCHLORIDE 40 MG/ML
SOLUTION TOPICAL ONCE
OUTPATIENT
Start: 2024-11-21 | End: 2024-11-21

## 2024-11-21 RX ORDER — NEOMYCIN/BACITRACIN/POLYMYXINB 3.5-400-5K
OINTMENT (GRAM) TOPICAL ONCE
OUTPATIENT
Start: 2024-11-21 | End: 2024-11-21

## 2024-11-21 RX ORDER — GENTAMICIN SULFATE 1 MG/G
OINTMENT TOPICAL ONCE
OUTPATIENT
Start: 2024-11-21 | End: 2024-11-21

## 2024-11-21 RX ORDER — LIDOCAINE 50 MG/G
OINTMENT TOPICAL ONCE
OUTPATIENT
Start: 2024-11-21 | End: 2024-11-21

## 2024-11-21 RX ORDER — SILVER SULFADIAZINE 10 MG/G
CREAM TOPICAL ONCE
OUTPATIENT
Start: 2024-11-21 | End: 2024-11-21

## 2024-11-21 RX ORDER — BETAMETHASONE DIPROPIONATE 0.5 MG/G
CREAM TOPICAL ONCE
OUTPATIENT
Start: 2024-11-21 | End: 2024-11-21

## 2024-11-21 RX ORDER — TRIAMCINOLONE ACETONIDE 1 MG/G
OINTMENT TOPICAL ONCE
OUTPATIENT
Start: 2024-11-21 | End: 2024-11-21

## 2024-11-21 RX ORDER — LIDOCAINE 40 MG/G
CREAM TOPICAL ONCE
OUTPATIENT
Start: 2024-11-21 | End: 2024-11-21

## 2024-11-21 RX ORDER — LIDOCAINE HYDROCHLORIDE 20 MG/ML
JELLY TOPICAL ONCE
OUTPATIENT
Start: 2024-11-21 | End: 2024-11-21

## 2024-11-21 RX ORDER — SODIUM CHLOR/HYPOCHLOROUS ACID 0.033 %
SOLUTION, IRRIGATION IRRIGATION ONCE
OUTPATIENT
Start: 2024-11-21 | End: 2024-11-21

## 2024-11-21 RX ORDER — CIPROFLOXACIN 750 MG/1
750 TABLET, FILM COATED ORAL 2 TIMES DAILY
COMMUNITY
Start: 2024-11-12 | End: 2024-12-03

## 2024-11-21 RX ORDER — GINSENG 100 MG
CAPSULE ORAL ONCE
OUTPATIENT
Start: 2024-11-21 | End: 2024-11-21

## 2024-11-21 ASSESSMENT — ENCOUNTER SYMPTOMS
VOMITING: 0
NAUSEA: 0

## 2024-11-21 NOTE — ASSESSMENT & PLAN NOTE
Assessment  Current every day smoker  Discussed effect of nicotine on wound healing; he is not interested in cessation at this point  Plan  Will readdress at next visit

## 2024-11-21 NOTE — ASSESSMENT & PLAN NOTE
Assessment  Right plantar 5th MTPJ wound bed is dry pink tissue with thin layer of slough and heavy callus to periwound; negative probe to bone, does not appear acutely infected  Patient is not offloading; he is 6'8 and 352 lb which means he likely has substantial pressure on the wound - he has a very large foot (size 16 shoe) so finding an option that will fit him may be difficult; suggested a knee walker but patient states he has bad knees and doesn't think he could use one - he is established with [facility]  Plan  Excisional debridement to remove devitalized tissue and promote wound healing  Wound care: Vashe cleanse, Hydrofera blue ready to right foot wound; change 3x weekly  Recommend patient go over to [facility] and inquire about options for offloading; we will be happy to send whatever information they need - regardless, patient to keep off of his foot as much as possible  RTC in 2 weeks

## 2024-11-21 NOTE — PROGRESS NOTES
Procedure Laterality Date   • APPENDECTOMY     • CHOLECYSTECTOMY     • CORONARY STENT PLACEMENT     • FOOT DEBRIDEMENT Left 5/20/2024    LEFT FOOT DEBRIDEMENT INCISION AND DRAINAGE, POSSIBLE LEFT FIFTH TOE AMPUTATION performed by Garrett Sanchez MD at [facility] MAIN OR   • FOOT SURGERY Left 4/30/2024    LEFT FOOT 5TH METATARSAL  HEAD EXCISION performed by Laz Fierro MD at [facility] OPC   • FOOT SURGERY Right 10/9/2024    RIGHT 5th metatarsal head excision performed by Juan Luis Bravo MD at [facility]   • NOSE SURGERY     • TOTAL KNEE ARTHROPLASTY Bilateral      FAMILY HISTORY  History reviewed. No pertinent family history.  SOCIAL HISTORY  Social History     Tobacco Use   • Smoking status: Every Day     Current packs/day: 1.50     Types: Cigarettes   • Smokeless tobacco: Never   Substance Use Topics   • Alcohol use: Not Currently   • Drug use: Never     ALLERGIES  No Known Allergies  MEDICATIONS  Current Outpatient Medications on File Prior to Encounter   Medication Sig Dispense Refill   • ciprofloxacin (CIPRO) 750 MG tablet Take 1 tablet by mouth 2 times daily     • docusate sodium (COLACE) 100 MG capsule Take 1 capsule by mouth daily as needed for Constipation 30 capsule 0   • ondansetron (ZOFRAN) 4 MG tablet Take 1 tablet by mouth daily as needed for Nausea or Vomiting 30 tablet 0   • Tirzepatide (MOUNJARO) 2.5 MG/0.5ML SOPN SC injection Inject 0.5 mLs into the skin once a week Hold for procedure.     • metoprolol succinate (TOPROL XL) 50 MG extended release tablet Take 1 tablet by mouth nightly     • metFORMIN (GLUCOPHAGE) 1000 MG tablet Take 1 tablet by mouth 2 times daily (with meals)     • omeprazole (PRILOSEC) 20 MG delayed release capsule Take 2 capsules by mouth nightly     • amLODIPine (NORVASC) 10 MG tablet Take 1 tablet by mouth nightly     • lisinopril (PRINIVIL;ZESTRIL) 40 MG tablet Take 1 tablet by mouth nightly     • cetirizine (ZYRTEC) 10 MG tablet Take 1 tablet by mouth at bedtime     •

## 2024-11-21 NOTE — FLOWSHEET NOTE
11/21/24 0842   Right Leg Edema Point of Measurement   Leg circumference 48.5 cm   Ankle circumference 30.5 cm   Foot circumference 29.5 cm   Left Leg Edema Point of Measurement   Leg circumference 48 cm   Ankle circumference 30 cm   Foot circumference 26.5 cm   RLE Neurovascular Assessment   Capillary Refill Less than/Equal to 3 seconds   Color Pink   Temperature Warm   R Post Tibial Pulse +2 (Moderate)   R Pedal Pulse +2   LLE Neurovascular Assessment   Capillary Refill Less than/Equal to 3 seconds   Color Pink   Temperature Warm   L Post Tibial Pulse +2 (Moderate)   L Pedal Pulse +2   Wound 11/21/24 Foot Right;Plantar;Lateral #1 Right plantar lateral foot   Date First Assessed/Time First Assessed: 11/21/24 0835   Present on Original Admission: Yes  Wound Approximate Age at First Assessment (Weeks): 6 weeks  Primary Wound Type: Surgical Type  Location: Foot  Wound Location Orientation: Right;Plantar;Later...   Wound Image     Wound Etiology Skin Tear   Dressing Status Other (Comment)  (no dressing)   Wound Cleansed Cleansed with saline   Offloading for Diabetic Foot Ulcers Offloading ordered   Wound Length (cm) 2.5 cm   Wound Width (cm) 0.4 cm   Wound Depth (cm) 0.3 cm   Wound Surface Area (cm^2) 1 cm^2   Wound Volume (cm^3) 0.3 cm^3   Post-Procedure Length (cm) 3 cm   Post-Procedure Width (cm) 1 cm   Post-Procedure Depth (cm) 0.3 cm   Post-Procedure Surface Area (cm^2) 3 cm^2   Post-Procedure Volume (cm^3) 0.9 cm^3   Wound Assessment Dry   Drainage Amount None (dry)   Odor None   Betty-wound Assessment Hyperkeratosis (callous)   Margins Defined edges   Wound Thickness Description not for Pressure Injury Full thickness   Wound 11/21/24 Foot Left;Plantar #2 Left plantar lateral   Date First Assessed/Time First Assessed: 11/21/24 0838   Wound Approximate Age at First Assessment (Weeks): 4 weeks  Primary Wound Type: Diabetic Ulcer  Location: Foot  Wound Location Orientation: Left;Plantar  Wound Description

## 2024-11-21 NOTE — ASSESSMENT & PLAN NOTE
Assessment  Hemorrhagic callus to left plantar fifth metatarsal head; no edema or erythema to suggest underlying infection  Patient does not wear diabetic shoes  Plan  No open wound after callus pared  Instructed patient to pare calluses with emery board or pumice stone; no sharp instruments  Recommend patient moisturize calluses with Vaseline daily  Encouraged patient to be fitted for diabetic shoes  Will continue to monitor

## 2024-12-13 ENCOUNTER — OFFICE VISIT (OUTPATIENT)
Dept: ORTHOPEDIC SURGERY | Age: 54
End: 2024-12-13

## 2024-12-13 DIAGNOSIS — S98.132A AMPUTATION OF FIFTH TOE OF LEFT FOOT (HCC): Primary | ICD-10-CM

## 2024-12-13 DIAGNOSIS — L03.032 CELLULITIS OF TOE OF LEFT FOOT: ICD-10-CM

## 2024-12-13 DIAGNOSIS — M86.9 OSTEOMYELITIS OF FIFTH TOE OF LEFT FOOT: ICD-10-CM

## 2024-12-13 DIAGNOSIS — G89.18 POST-OP PAIN: ICD-10-CM

## 2024-12-13 PROCEDURE — 99024 POSTOP FOLLOW-UP VISIT: CPT | Performed by: ORTHOPAEDIC SURGERY

## 2024-12-16 ENCOUNTER — CLINICAL DOCUMENTATION (OUTPATIENT)
Dept: ORTHOPEDIC SURGERY | Age: 54
End: 2024-12-16

## 2025-01-18 ENCOUNTER — APPOINTMENT (OUTPATIENT)
Dept: GENERAL RADIOLOGY | Age: 55
End: 2025-01-18
Payer: COMMERCIAL

## 2025-01-18 ENCOUNTER — HOSPITAL ENCOUNTER (EMERGENCY)
Age: 55
Discharge: HOME | End: 2025-01-18
Attending: EMERGENCY MEDICINE
Payer: COMMERCIAL

## 2025-01-18 VITALS
SYSTOLIC BLOOD PRESSURE: 146 MMHG | WEIGHT: 315 LBS | RESPIRATION RATE: 17 BRPM | HEART RATE: 67 BPM | OXYGEN SATURATION: 97 % | BODY MASS INDEX: 36.45 KG/M2 | DIASTOLIC BLOOD PRESSURE: 86 MMHG | TEMPERATURE: 97.5 F | HEIGHT: 78 IN

## 2025-01-18 DIAGNOSIS — L97.528 ULCER OF LEFT FOOT WITH OTHER SEVERITY (HCC): Primary | ICD-10-CM

## 2025-01-18 LAB
ANION GAP SERPL CALC-SCNC: 14 MMOL/L (ref 7–16)
BASOPHILS # BLD: 0.05 K/UL (ref 0–0.2)
BASOPHILS NFR BLD: 0.5 % (ref 0–2)
BUN SERPL-MCNC: 14 MG/DL (ref 6–23)
CALCIUM SERPL-MCNC: 8.9 MG/DL (ref 8.8–10.2)
CHLORIDE SERPL-SCNC: 105 MMOL/L (ref 98–107)
CO2 SERPL-SCNC: 22 MMOL/L (ref 20–29)
CREAT SERPL-MCNC: 0.95 MG/DL (ref 0.8–1.3)
CRP SERPL-MCNC: 6.1 MG/DL (ref 0–0.4)
DIFFERENTIAL METHOD BLD: NORMAL
EOSINOPHIL # BLD: 0.12 K/UL (ref 0–0.8)
EOSINOPHIL NFR BLD: 1.2 % (ref 0.5–7.8)
ERYTHROCYTE [DISTWIDTH] IN BLOOD BY AUTOMATED COUNT: 14.6 % (ref 11.9–14.6)
ERYTHROCYTE [SEDIMENTATION RATE] IN BLOOD: 22 MM/HR
GLUCOSE SERPL-MCNC: 172 MG/DL (ref 70–99)
HCT VFR BLD AUTO: 47.2 % (ref 41.1–50.3)
HGB BLD-MCNC: 15.3 G/DL (ref 13.6–17.2)
IMM GRANULOCYTES # BLD AUTO: 0.08 K/UL (ref 0–0.5)
IMM GRANULOCYTES NFR BLD AUTO: 0.8 % (ref 0–5)
LYMPHOCYTES # BLD: 2.35 K/UL (ref 0.5–4.6)
LYMPHOCYTES NFR BLD: 23 % (ref 13–44)
MCH RBC QN AUTO: 27.9 PG (ref 26.1–32.9)
MCHC RBC AUTO-ENTMCNC: 32.4 G/DL (ref 31.4–35)
MCV RBC AUTO: 86 FL (ref 82–102)
MONOCYTES # BLD: 0.84 K/UL (ref 0.1–1.3)
MONOCYTES NFR BLD: 8.2 % (ref 4–12)
NEUTS SEG # BLD: 6.76 K/UL (ref 1.7–8.2)
NEUTS SEG NFR BLD: 66.3 % (ref 43–78)
NRBC # BLD: 0 K/UL (ref 0–0.2)
PLATELET # BLD AUTO: 197 K/UL (ref 150–450)
PMV BLD AUTO: 9.8 FL (ref 9.4–12.3)
POTASSIUM SERPL-SCNC: 4.2 MMOL/L (ref 3.5–5.1)
RBC # BLD AUTO: 5.49 M/UL (ref 4.23–5.6)
SODIUM SERPL-SCNC: 141 MMOL/L (ref 136–145)
WBC # BLD AUTO: 10.2 K/UL (ref 4.3–11.1)

## 2025-01-18 PROCEDURE — 85652 RBC SED RATE AUTOMATED: CPT

## 2025-01-18 PROCEDURE — 99284 EMERGENCY DEPT VISIT MOD MDM: CPT

## 2025-01-18 PROCEDURE — 86140 C-REACTIVE PROTEIN: CPT

## 2025-01-18 PROCEDURE — 85025 COMPLETE CBC W/AUTO DIFF WBC: CPT

## 2025-01-18 PROCEDURE — 73630 X-RAY EXAM OF FOOT: CPT

## 2025-01-18 PROCEDURE — 80048 BASIC METABOLIC PNL TOTAL CA: CPT

## 2025-01-18 RX ORDER — CIPROFLOXACIN 500 MG/1
500 TABLET, FILM COATED ORAL 2 TIMES DAILY
Qty: 14 TABLET | Refills: 0 | Status: ON HOLD | OUTPATIENT
Start: 2025-01-18 | End: 2025-01-25 | Stop reason: HOSPADM

## 2025-01-18 RX ORDER — CLINDAMYCIN HYDROCHLORIDE 300 MG/1
300 CAPSULE ORAL 4 TIMES DAILY
Qty: 28 CAPSULE | Refills: 0 | Status: ON HOLD | OUTPATIENT
Start: 2025-01-18 | End: 2025-01-25 | Stop reason: HOSPADM

## 2025-01-18 ASSESSMENT — PAIN DESCRIPTION - ORIENTATION: ORIENTATION: LEFT

## 2025-01-18 ASSESSMENT — PAIN DESCRIPTION - LOCATION: LOCATION: FOOT

## 2025-01-18 ASSESSMENT — ENCOUNTER SYMPTOMS
NAUSEA: 0
ABDOMINAL PAIN: 0
SHORTNESS OF BREATH: 0
BACK PAIN: 0
VOMITING: 0

## 2025-01-18 ASSESSMENT — PAIN SCALES - GENERAL: PAINLEVEL_OUTOF10: 7

## 2025-01-18 ASSESSMENT — LIFESTYLE VARIABLES
HOW MANY STANDARD DRINKS CONTAINING ALCOHOL DO YOU HAVE ON A TYPICAL DAY: PATIENT DECLINED
HOW OFTEN DO YOU HAVE A DRINK CONTAINING ALCOHOL: PATIENT DECLINED

## 2025-01-18 ASSESSMENT — PAIN - FUNCTIONAL ASSESSMENT: PAIN_FUNCTIONAL_ASSESSMENT: 0-10

## 2025-01-18 NOTE — ED PROVIDER NOTES
[facility] Emergency Department Provider Note                   PCP:                File, Not On, MD               Age: 54 y.o.      Sex: male     MEDICAL DECISION MAKING  Complexity of Problems Addressed:   1 or more chronic illness with an exacerbation or progression    Data Reviewed and Analyzed:  Category 1:    I have reviewed outside records from an external source for any pertinent PMH, ED visits, primary care visits, specialist visits, labs, EKG, and/or radiologic studies.    Category 2:       I independently ordered and reviewed the labs.    I have reviewed the Radiologist interpretation of the radiologic studies. My medical decision making regarding the radiologic studies is based on the interpretation report of the board certified Radiologist.          Category 3:     Discussion of management or test interpretation:    MDM  Number of Diagnoses or Management Options  Ulcer of left foot with other severity (HCC)  Diagnosis management comments: Patient with a history of diabetic foot wound presents for a necrotic ulcer wound.  Patient's foot x-ray shows no obvious osteomyelitis.  Patient is afebrile with a normal white count.  His sed rate is mildly elevated.  Clinically I do not suspect see any obvious infection to this wound.  He is neurovascularly intact.  I am going to prescribe an antibiotic just in case he has an underlying infection.  Patient already has an appointment scheduled with a podiatrist in 5 days.  I also recommended that he call his orthopedic surgeon to be seen.  Patient was discharged home.    Based on patient's symptoms, exam, and a thorough evaluation, I do not suspect DVT, vascular ischemia, necrotizing fasciitis, deep space abscess, bone fracture, joint dislocation, septic arthritis, acute osteomyelitis, compartment syndrome, rhabdomyolysis, or any other acute, emergent extremity process.              Nilay Whittaker is a 54 y.o. male who presents to the Emergency Department with

## 2025-01-18 NOTE — ED TRIAGE NOTES
Patient arrived with a  complaint of a left foot wound - drainage (little), odor,   History of DM  Amputated- last toe (pinky)- last may. Non healing wound

## 2025-01-20 ENCOUNTER — TELEPHONE (OUTPATIENT)
Dept: ORTHOPEDIC SURGERY | Age: 55
End: 2025-01-20

## 2025-01-20 NOTE — TELEPHONE ENCOUNTER
Central Prior Authorization Team   Phone: [phone]    PA Initiation    Medication: losartan (COZAAR) 100 MG tablet  Insurance Company: [employer] [place] - Phone [phone] Fax [phone]  Pharmacy Filling the Rx: [facility] [address] [place] [address] [address] AT [facility] [address]  Filling Pharmacy Phone: [phone]  Filling Pharmacy Fax: [phone]  Start Date: 12/5/2018           Went to ER this weekend, L foot has an infection ,

## 2025-01-21 ENCOUNTER — ANESTHESIA EVENT (OUTPATIENT)
Dept: SURGERY | Age: 55
End: 2025-01-21
Payer: COMMERCIAL

## 2025-01-21 ENCOUNTER — OFFICE VISIT (OUTPATIENT)
Dept: ORTHOPEDIC SURGERY | Age: 55
End: 2025-01-21
Payer: COMMERCIAL

## 2025-01-21 DIAGNOSIS — G89.18 POST-OP PAIN: Primary | ICD-10-CM

## 2025-01-21 DIAGNOSIS — M79.672 LEFT FOOT PAIN: ICD-10-CM

## 2025-01-21 PROCEDURE — 99215 OFFICE O/P EST HI 40 MIN: CPT | Performed by: ORTHOPAEDIC SURGERY

## 2025-01-21 RX ORDER — ONDANSETRON 4 MG/1
4 TABLET, FILM COATED ORAL DAILY PRN
Qty: 30 TABLET | Refills: 0 | Status: SHIPPED | OUTPATIENT
Start: 2025-01-21

## 2025-01-21 RX ORDER — DOCUSATE SODIUM 100 MG/1
100 CAPSULE, LIQUID FILLED ORAL DAILY PRN
Qty: 30 CAPSULE | Refills: 0 | Status: ON HOLD | OUTPATIENT
Start: 2025-01-21 | End: 2025-01-25 | Stop reason: HOSPADM

## 2025-01-21 RX ORDER — OXYCODONE HYDROCHLORIDE 5 MG/1
5 TABLET ORAL EVERY 6 HOURS PRN
Qty: 20 TABLET | Refills: 0 | Status: ON HOLD | OUTPATIENT
Start: 2025-01-21 | End: 2025-01-25 | Stop reason: HOSPADM

## 2025-01-21 NOTE — PRE-PROCEDURE INSTRUCTIONS
Preop department called to notify patient of arrival time for scheduled procedure. Instructions given to   - Arrive at [facility] Entrance [address].  - No solid food after midnight & Please drink 32 ounces of water 2 hours prior to your arrival to avoid dehydration unless otherwise indicated. No gum, mints, or ice chips.   - Have a responsible adult to drive patient to the hospital, stay during surgery, and patient will need supervision 24 hours after anesthesia.   - Use antibacterial soap in shower the night before surgery and on the morning of surgery.       Was patient contacted: Yes  Voicemail left: no  Numbers contacted: [phone]   Arrival time: 1230  Time to complete 32 ounces of water: 1030

## 2025-01-21 NOTE — PROGRESS NOTES
Name: Nilay Whittaker  YOB: 1970  Gender: male  MRN: [id number]    Summary: Proceed with left foot wound debridement, excision of fourth metatarsal head and fourth metatarsal bone, with wound VAC placement    Pop saph-admit postop     CC: left foot wound    History of Present Illness  The patient presents for evaluation of a left foot wound.    The patient was previously referred to a wound care specialist for his right foot. Attention has now been drawn to a progressively enlarging callus on his left foot. He reports no associated pain. His wife has observed that the callus is firm and has not received any treatment. The patient chose to consult with us instead of attending a scheduled follow-up with another podiatrist. He has been unable to obtain the prescribed orthotic inserts until 02/04/2024. He is currently taking Ciprofloxacin 500 mg and an unidentified blue pill, both of which were initiated during an emergency room visit on Saturday. 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 Horne.    MEDICATIONS  Cipro, clindamycin      ROS/Meds/PSH/PMH/FH/SH:   Patient Denies fever/chills, headache, visual changes, chest pain, shortness of breath, and nausea/vomiting/diarrhea     Tobacco:  reports that he has been smoking cigarettes. He has never used smokeless tobacco.  Diabetes: Diabetic - Insulin dependent  Lab Results   Component Value Date    LABA1C 8.8 (H) 05/17/2024       Physical Exam:  Cavovarus foot posture when standing.   Neuro:Absent SILT with dense neuropathy etire foot confirmed w/ Essex-Weinsten 5.07 monofilament  Vascular: negative    In regards to their wound: There is a necrotic foul-smelling wound with pus coming out of it underneath the fourth metatarsal head.  It probes down to the bone.  Prior fifth ray amputation is noted.  No signs of erythema extending proximally the foot.      Imaging:   No imaging reviewed

## 2025-01-22 ENCOUNTER — HOSPITAL ENCOUNTER (INPATIENT)
Age: 55
LOS: 3 days | Discharge: HOME HEALTH | End: 2025-01-25
Attending: ORTHOPAEDIC SURGERY | Admitting: ORTHOPAEDIC SURGERY
Payer: COMMERCIAL

## 2025-01-22 ENCOUNTER — ANESTHESIA (OUTPATIENT)
Dept: SURGERY | Age: 55
End: 2025-01-22
Payer: COMMERCIAL

## 2025-01-22 ENCOUNTER — APPOINTMENT (OUTPATIENT)
Dept: GENERAL RADIOLOGY | Age: 55
End: 2025-01-22
Attending: ORTHOPAEDIC SURGERY
Payer: COMMERCIAL

## 2025-01-22 DIAGNOSIS — M79.672 LEFT FOOT PAIN: ICD-10-CM

## 2025-01-22 PROBLEM — L08.9 DIABETIC FOOT INFECTION (HCC): Status: ACTIVE | Noted: 2025-01-22

## 2025-01-22 PROBLEM — A41.9 SEPSIS (HCC): Status: ACTIVE | Noted: 2025-01-22

## 2025-01-22 PROBLEM — Z98.890 S/P FOOT SURGERY: Status: ACTIVE | Noted: 2025-01-22

## 2025-01-22 PROBLEM — E11.628 DIABETIC FOOT INFECTION (HCC): Status: ACTIVE | Noted: 2025-01-22

## 2025-01-22 PROBLEM — I25.10 CAD (CORONARY ARTERY DISEASE): Status: ACTIVE | Noted: 2025-01-22

## 2025-01-22 LAB
ALBUMIN SERPL-MCNC: 2.8 G/DL (ref 3.5–5)
ALBUMIN/GLOB SERPL: 0.7 (ref 1–1.9)
ALP SERPL-CCNC: 81 U/L (ref 40–129)
ALT SERPL-CCNC: 10 U/L (ref 8–55)
ANION GAP SERPL CALC-SCNC: 14 MMOL/L (ref 7–16)
AST SERPL-CCNC: 12 U/L (ref 15–37)
BASOPHILS # BLD: 0.07 K/UL (ref 0–0.2)
BASOPHILS NFR BLD: 0.4 % (ref 0–2)
BILIRUB SERPL-MCNC: 0.5 MG/DL (ref 0–1.2)
BUN SERPL-MCNC: 17 MG/DL (ref 6–23)
CALCIUM SERPL-MCNC: 8.8 MG/DL (ref 8.8–10.2)
CHLORIDE SERPL-SCNC: 101 MMOL/L (ref 98–107)
CO2 SERPL-SCNC: 22 MMOL/L (ref 20–29)
CREAT SERPL-MCNC: 0.95 MG/DL (ref 0.8–1.3)
DIFFERENTIAL METHOD BLD: ABNORMAL
EOSINOPHIL # BLD: 0.03 K/UL (ref 0–0.8)
EOSINOPHIL NFR BLD: 0.2 % (ref 0.5–7.8)
ERYTHROCYTE [DISTWIDTH] IN BLOOD BY AUTOMATED COUNT: 14.4 % (ref 11.9–14.6)
GLOBULIN SER CALC-MCNC: 4 G/DL (ref 2.3–3.5)
GLUCOSE BLD STRIP.AUTO-MCNC: 135 MG/DL (ref 65–100)
GLUCOSE BLD STRIP.AUTO-MCNC: 148 MG/DL (ref 65–100)
GLUCOSE BLD STRIP.AUTO-MCNC: 163 MG/DL (ref 65–100)
GLUCOSE SERPL-MCNC: 139 MG/DL (ref 70–99)
HCT VFR BLD AUTO: 41.2 % (ref 41.1–50.3)
HGB BLD-MCNC: 13.5 G/DL (ref 13.6–17.2)
IMM GRANULOCYTES # BLD AUTO: 0.12 K/UL (ref 0–0.5)
IMM GRANULOCYTES NFR BLD AUTO: 0.7 % (ref 0–5)
LACTATE SERPL-SCNC: 0.9 MMOL/L (ref 0.5–2)
LYMPHOCYTES # BLD: 1.74 K/UL (ref 0.5–4.6)
LYMPHOCYTES NFR BLD: 10.8 % (ref 13–44)
MCH RBC QN AUTO: 28 PG (ref 26.1–32.9)
MCHC RBC AUTO-ENTMCNC: 32.8 G/DL (ref 31.4–35)
MCV RBC AUTO: 85.5 FL (ref 82–102)
MONOCYTES # BLD: 1.58 K/UL (ref 0.1–1.3)
MONOCYTES NFR BLD: 9.8 % (ref 4–12)
NEUTS SEG # BLD: 12.58 K/UL (ref 1.7–8.2)
NEUTS SEG NFR BLD: 78.1 % (ref 43–78)
NRBC # BLD: 0 K/UL (ref 0–0.2)
PLATELET # BLD AUTO: 256 K/UL (ref 150–450)
PMV BLD AUTO: 9.9 FL (ref 9.4–12.3)
POTASSIUM SERPL-SCNC: 3.9 MMOL/L (ref 3.5–5.1)
PROT SERPL-MCNC: 6.8 G/DL (ref 6.3–8.2)
RBC # BLD AUTO: 4.82 M/UL (ref 4.23–5.6)
SERVICE CMNT-IMP: ABNORMAL
SODIUM SERPL-SCNC: 137 MMOL/L (ref 136–145)
WBC # BLD AUTO: 16.1 K/UL (ref 4.3–11.1)

## 2025-01-22 PROCEDURE — 6360000002 HC RX W HCPCS: Performed by: INTERNAL MEDICINE

## 2025-01-22 PROCEDURE — 6370000000 HC RX 637 (ALT 250 FOR IP): Performed by: ANESTHESIOLOGY

## 2025-01-22 PROCEDURE — 6360000002 HC RX W HCPCS: Performed by: NURSE ANESTHETIST, CERTIFIED REGISTERED

## 2025-01-22 PROCEDURE — 93005 ELECTROCARDIOGRAM TRACING: CPT | Performed by: INTERNAL MEDICINE

## 2025-01-22 PROCEDURE — 3600000002 HC SURGERY LEVEL 2 BASE: Performed by: ORTHOPAEDIC SURGERY

## 2025-01-22 PROCEDURE — 64447 NJX AA&/STRD FEMORAL NRV IMG: CPT | Performed by: ANESTHESIOLOGY

## 2025-01-22 PROCEDURE — 87077 CULTURE AEROBIC IDENTIFY: CPT

## 2025-01-22 PROCEDURE — 83605 ASSAY OF LACTIC ACID: CPT

## 2025-01-22 PROCEDURE — 7100000000 HC PACU RECOVERY - FIRST 15 MIN: Performed by: ORTHOPAEDIC SURGERY

## 2025-01-22 PROCEDURE — 2709999900 HC NON-CHARGEABLE SUPPLY: Performed by: ORTHOPAEDIC SURGERY

## 2025-01-22 PROCEDURE — 85025 COMPLETE CBC W/AUTO DIFF WBC: CPT

## 2025-01-22 PROCEDURE — 87102 FUNGUS ISOLATION CULTURE: CPT

## 2025-01-22 PROCEDURE — 2500000003 HC RX 250 WO HCPCS: Performed by: PHYSICIAN ASSISTANT

## 2025-01-22 PROCEDURE — 87206 SMEAR FLUORESCENT/ACID STAI: CPT

## 2025-01-22 PROCEDURE — 36415 COLL VENOUS BLD VENIPUNCTURE: CPT

## 2025-01-22 PROCEDURE — 7100000001 HC PACU RECOVERY - ADDTL 15 MIN: Performed by: ORTHOPAEDIC SURGERY

## 2025-01-22 PROCEDURE — 3600000012 HC SURGERY LEVEL 2 ADDTL 15MIN: Performed by: ORTHOPAEDIC SURGERY

## 2025-01-22 PROCEDURE — 6360000002 HC RX W HCPCS: Performed by: ORTHOPAEDIC SURGERY

## 2025-01-22 PROCEDURE — 82962 GLUCOSE BLOOD TEST: CPT

## 2025-01-22 PROCEDURE — 3700000000 HC ANESTHESIA ATTENDED CARE: Performed by: ORTHOPAEDIC SURGERY

## 2025-01-22 PROCEDURE — 2580000003 HC RX 258: Performed by: INTERNAL MEDICINE

## 2025-01-22 PROCEDURE — 6370000000 HC RX 637 (ALT 250 FOR IP): Performed by: INTERNAL MEDICINE

## 2025-01-22 PROCEDURE — 0QBP0ZZ EXCISION OF LEFT METATARSAL, OPEN APPROACH: ICD-10-PCS | Performed by: ORTHOPAEDIC SURGERY

## 2025-01-22 PROCEDURE — 87070 CULTURE OTHR SPECIMN AEROBIC: CPT

## 2025-01-22 PROCEDURE — 1100000000 HC RM PRIVATE

## 2025-01-22 PROCEDURE — 6360000002 HC RX W HCPCS: Performed by: ANESTHESIOLOGY

## 2025-01-22 PROCEDURE — 87186 SC STD MICRODIL/AGAR DIL: CPT

## 2025-01-22 PROCEDURE — 2500000003 HC RX 250 WO HCPCS: Performed by: NURSE ANESTHETIST, CERTIFIED REGISTERED

## 2025-01-22 PROCEDURE — 6360000002 HC RX W HCPCS: Performed by: PHYSICIAN ASSISTANT

## 2025-01-22 PROCEDURE — 87205 SMEAR GRAM STAIN: CPT

## 2025-01-22 PROCEDURE — 87075 CULTR BACTERIA EXCEPT BLOOD: CPT

## 2025-01-22 PROCEDURE — 64445 NJX AA&/STRD SCIATIC NRV IMG: CPT | Performed by: ANESTHESIOLOGY

## 2025-01-22 PROCEDURE — 80053 COMPREHEN METABOLIC PANEL: CPT

## 2025-01-22 PROCEDURE — 3700000001 HC ADD 15 MINUTES (ANESTHESIA): Performed by: ORTHOPAEDIC SURGERY

## 2025-01-22 PROCEDURE — 3E0T3BZ INTRODUCTION OF ANESTHETIC AGENT INTO PERIPHERAL NERVES AND PLEXI, PERCUTANEOUS APPROACH: ICD-10-PCS | Performed by: ANESTHESIOLOGY

## 2025-01-22 PROCEDURE — 87040 BLOOD CULTURE FOR BACTERIA: CPT

## 2025-01-22 RX ORDER — ENOXAPARIN SODIUM 100 MG/ML
40 INJECTION SUBCUTANEOUS 2 TIMES DAILY
Status: DISCONTINUED | OUTPATIENT
Start: 2025-01-23 | End: 2025-01-25 | Stop reason: HOSPADM

## 2025-01-22 RX ORDER — ROSUVASTATIN CALCIUM 20 MG/1
40 TABLET, COATED ORAL EVERY EVENING
Status: DISCONTINUED | OUTPATIENT
Start: 2025-01-22 | End: 2025-01-25 | Stop reason: HOSPADM

## 2025-01-22 RX ORDER — SODIUM CHLORIDE 0.9 % (FLUSH) 0.9 %
5-40 SYRINGE (ML) INJECTION EVERY 12 HOURS SCHEDULED
Status: DISCONTINUED | OUTPATIENT
Start: 2025-01-22 | End: 2025-01-25 | Stop reason: HOSPADM

## 2025-01-22 RX ORDER — PROCHLORPERAZINE EDISYLATE 5 MG/ML
5 INJECTION INTRAMUSCULAR; INTRAVENOUS
Status: DISCONTINUED | OUTPATIENT
Start: 2025-01-22 | End: 2025-01-22 | Stop reason: HOSPADM

## 2025-01-22 RX ORDER — CETIRIZINE HYDROCHLORIDE 10 MG/1
10 TABLET ORAL NIGHTLY
Status: DISCONTINUED | OUTPATIENT
Start: 2025-01-22 | End: 2025-01-25 | Stop reason: HOSPADM

## 2025-01-22 RX ORDER — SODIUM CHLORIDE 0.9 % (FLUSH) 0.9 %
5-40 SYRINGE (ML) INJECTION PRN
Status: DISCONTINUED | OUTPATIENT
Start: 2025-01-22 | End: 2025-01-25 | Stop reason: HOSPADM

## 2025-01-22 RX ORDER — SODIUM CHLORIDE 0.9 % (FLUSH) 0.9 %
5-40 SYRINGE (ML) INJECTION EVERY 12 HOURS SCHEDULED
Status: DISCONTINUED | OUTPATIENT
Start: 2025-01-22 | End: 2025-01-22 | Stop reason: HOSPADM

## 2025-01-22 RX ORDER — OXYCODONE HYDROCHLORIDE 5 MG/1
5 TABLET ORAL
Status: DISCONTINUED | OUTPATIENT
Start: 2025-01-22 | End: 2025-01-22 | Stop reason: HOSPADM

## 2025-01-22 RX ORDER — MIDAZOLAM HYDROCHLORIDE 2 MG/2ML
2 INJECTION, SOLUTION INTRAMUSCULAR; INTRAVENOUS
Status: COMPLETED | OUTPATIENT
Start: 2025-01-22 | End: 2025-01-22

## 2025-01-22 RX ORDER — MONTELUKAST SODIUM 10 MG/1
10 TABLET ORAL NIGHTLY
Status: DISCONTINUED | OUTPATIENT
Start: 2025-01-22 | End: 2025-01-25 | Stop reason: HOSPADM

## 2025-01-22 RX ORDER — LIDOCAINE HYDROCHLORIDE 10 MG/ML
1 INJECTION, SOLUTION INFILTRATION; PERINEURAL
Status: DISCONTINUED | OUTPATIENT
Start: 2025-01-22 | End: 2025-01-22 | Stop reason: HOSPADM

## 2025-01-22 RX ORDER — SODIUM CHLORIDE 9 MG/ML
INJECTION, SOLUTION INTRAVENOUS PRN
Status: DISCONTINUED | OUTPATIENT
Start: 2025-01-22 | End: 2025-01-22 | Stop reason: HOSPADM

## 2025-01-22 RX ORDER — POTASSIUM CHLORIDE 1500 MG/1
40 TABLET, EXTENDED RELEASE ORAL PRN
Status: DISCONTINUED | OUTPATIENT
Start: 2025-01-22 | End: 2025-01-25 | Stop reason: HOSPADM

## 2025-01-22 RX ORDER — NALOXONE HYDROCHLORIDE 0.4 MG/ML
INJECTION, SOLUTION INTRAMUSCULAR; INTRAVENOUS; SUBCUTANEOUS PRN
Status: DISCONTINUED | OUTPATIENT
Start: 2025-01-22 | End: 2025-01-22 | Stop reason: HOSPADM

## 2025-01-22 RX ORDER — INSULIN LISPRO 100 [IU]/ML
0-4 INJECTION, SOLUTION INTRAVENOUS; SUBCUTANEOUS
Status: DISCONTINUED | OUTPATIENT
Start: 2025-01-22 | End: 2025-01-25 | Stop reason: HOSPADM

## 2025-01-22 RX ORDER — ONDANSETRON 4 MG/1
4 TABLET, ORALLY DISINTEGRATING ORAL EVERY 8 HOURS PRN
Status: DISCONTINUED | OUTPATIENT
Start: 2025-01-22 | End: 2025-01-25 | Stop reason: HOSPADM

## 2025-01-22 RX ORDER — PROPOFOL 10 MG/ML
INJECTION, EMULSION INTRAVENOUS
Status: DISCONTINUED | OUTPATIENT
Start: 2025-01-22 | End: 2025-01-22 | Stop reason: SDUPTHER

## 2025-01-22 RX ORDER — LINEZOLID 2 MG/ML
600 INJECTION, SOLUTION INTRAVENOUS EVERY 12 HOURS
Status: DISCONTINUED | OUTPATIENT
Start: 2025-01-22 | End: 2025-01-25

## 2025-01-22 RX ORDER — IBUPROFEN 600 MG/1
1 TABLET ORAL PRN
Status: DISCONTINUED | OUTPATIENT
Start: 2025-01-22 | End: 2025-01-25 | Stop reason: HOSPADM

## 2025-01-22 RX ORDER — MAGNESIUM SULFATE IN WATER 40 MG/ML
2000 INJECTION, SOLUTION INTRAVENOUS PRN
Status: DISCONTINUED | OUTPATIENT
Start: 2025-01-22 | End: 2025-01-25 | Stop reason: HOSPADM

## 2025-01-22 RX ORDER — ONDANSETRON 2 MG/ML
4 INJECTION INTRAMUSCULAR; INTRAVENOUS EVERY 6 HOURS PRN
Status: DISCONTINUED | OUTPATIENT
Start: 2025-01-22 | End: 2025-01-25 | Stop reason: HOSPADM

## 2025-01-22 RX ORDER — ORAL SEMAGLUTIDE 3 MG/1
3 TABLET ORAL DAILY
COMMUNITY

## 2025-01-22 RX ORDER — SODIUM CHLORIDE 9 MG/ML
INJECTION, SOLUTION INTRAVENOUS PRN
Status: DISCONTINUED | OUTPATIENT
Start: 2025-01-22 | End: 2025-01-25 | Stop reason: HOSPADM

## 2025-01-22 RX ORDER — SODIUM CHLORIDE, SODIUM LACTATE, POTASSIUM CHLORIDE, CALCIUM CHLORIDE 600; 310; 30; 20 MG/100ML; MG/100ML; MG/100ML; MG/100ML
INJECTION, SOLUTION INTRAVENOUS CONTINUOUS
Status: DISCONTINUED | OUTPATIENT
Start: 2025-01-22 | End: 2025-01-22 | Stop reason: HOSPADM

## 2025-01-22 RX ORDER — FENTANYL CITRATE 50 UG/ML
100 INJECTION, SOLUTION INTRAMUSCULAR; INTRAVENOUS
Status: COMPLETED | OUTPATIENT
Start: 2025-01-22 | End: 2025-01-22

## 2025-01-22 RX ORDER — ACETAMINOPHEN 325 MG/1
650 TABLET ORAL EVERY 6 HOURS PRN
Status: DISCONTINUED | OUTPATIENT
Start: 2025-01-22 | End: 2025-01-25 | Stop reason: HOSPADM

## 2025-01-22 RX ORDER — HALOPERIDOL 5 MG/ML
1 INJECTION INTRAMUSCULAR
Status: DISCONTINUED | OUTPATIENT
Start: 2025-01-22 | End: 2025-01-22 | Stop reason: HOSPADM

## 2025-01-22 RX ORDER — DEXMEDETOMIDINE HYDROCHLORIDE 100 UG/ML
INJECTION, SOLUTION INTRAVENOUS
Status: DISCONTINUED | OUTPATIENT
Start: 2025-01-22 | End: 2025-01-22 | Stop reason: SDUPTHER

## 2025-01-22 RX ORDER — SODIUM CHLORIDE 0.9 % (FLUSH) 0.9 %
SYRINGE (ML) INJECTION
Status: DISCONTINUED | OUTPATIENT
Start: 2025-01-22 | End: 2025-01-22 | Stop reason: SDUPTHER

## 2025-01-22 RX ORDER — POTASSIUM CHLORIDE 7.45 MG/ML
10 INJECTION INTRAVENOUS PRN
Status: DISCONTINUED | OUTPATIENT
Start: 2025-01-22 | End: 2025-01-25 | Stop reason: HOSPADM

## 2025-01-22 RX ORDER — ROPIVACAINE HYDROCHLORIDE 5 MG/ML
INJECTION, SOLUTION EPIDURAL; INFILTRATION; PERINEURAL
Status: COMPLETED | OUTPATIENT
Start: 2025-01-22 | End: 2025-01-22

## 2025-01-22 RX ORDER — PANTOPRAZOLE SODIUM 40 MG/1
40 TABLET, DELAYED RELEASE ORAL
Status: DISCONTINUED | OUTPATIENT
Start: 2025-01-23 | End: 2025-01-25 | Stop reason: HOSPADM

## 2025-01-22 RX ORDER — ACETAMINOPHEN 500 MG
1000 TABLET ORAL ONCE
Status: COMPLETED | OUTPATIENT
Start: 2025-01-22 | End: 2025-01-22

## 2025-01-22 RX ORDER — SODIUM CHLORIDE 0.9 % (FLUSH) 0.9 %
5-40 SYRINGE (ML) INJECTION PRN
Status: DISCONTINUED | OUTPATIENT
Start: 2025-01-22 | End: 2025-01-22 | Stop reason: HOSPADM

## 2025-01-22 RX ORDER — ACETAMINOPHEN 650 MG/1
650 SUPPOSITORY RECTAL EVERY 6 HOURS PRN
Status: DISCONTINUED | OUTPATIENT
Start: 2025-01-22 | End: 2025-01-25 | Stop reason: HOSPADM

## 2025-01-22 RX ORDER — VANCOMYCIN HYDROCHLORIDE 1 G/20ML
INJECTION, POWDER, LYOPHILIZED, FOR SOLUTION INTRAVENOUS PRN
Status: DISCONTINUED | OUTPATIENT
Start: 2025-01-22 | End: 2025-01-22 | Stop reason: ALTCHOICE

## 2025-01-22 RX ORDER — DEXTROSE MONOHYDRATE 100 MG/ML
INJECTION, SOLUTION INTRAVENOUS CONTINUOUS PRN
Status: DISCONTINUED | OUTPATIENT
Start: 2025-01-22 | End: 2025-01-25 | Stop reason: HOSPADM

## 2025-01-22 RX ORDER — POLYETHYLENE GLYCOL 3350 17 G/17G
17 POWDER, FOR SOLUTION ORAL DAILY PRN
Status: DISCONTINUED | OUTPATIENT
Start: 2025-01-22 | End: 2025-01-25 | Stop reason: HOSPADM

## 2025-01-22 RX ADMIN — ROSUVASTATIN CALCIUM 40 MG: 20 TABLET, FILM COATED ORAL at 18:07

## 2025-01-22 RX ADMIN — LINEZOLID 600 MG: 600 INJECTION, SOLUTION INTRAVENOUS at 18:13

## 2025-01-22 RX ADMIN — DEXMEDETOMIDINE 12 MCG: 100 INJECTION, SOLUTION, CONCENTRATE INTRAVENOUS at 15:23

## 2025-01-22 RX ADMIN — PROPOFOL 140 MCG/KG/MIN: 10 INJECTION, EMULSION INTRAVENOUS at 15:24

## 2025-01-22 RX ADMIN — SODIUM CHLORIDE, PRESERVATIVE FREE 30 ML: 5 INJECTION INTRAVENOUS at 15:23

## 2025-01-22 RX ADMIN — CEFAZOLIN 3000 MG: 3 INJECTION, POWDER, FOR SOLUTION INTRAVENOUS at 15:34

## 2025-01-22 RX ADMIN — MONTELUKAST 10 MG: 10 TABLET, FILM COATED ORAL at 22:09

## 2025-01-22 RX ADMIN — MIDAZOLAM 2 MG: 1 INJECTION INTRAMUSCULAR; INTRAVENOUS at 13:35

## 2025-01-22 RX ADMIN — SODIUM CHLORIDE, PRESERVATIVE FREE 30 ML: 5 INJECTION INTRAVENOUS at 15:51

## 2025-01-22 RX ADMIN — ROPIVACAINE HYDROCHLORIDE 25 ML: 5 INJECTION, SOLUTION EPIDURAL; INFILTRATION; PERINEURAL at 13:35

## 2025-01-22 RX ADMIN — SODIUM CHLORIDE: 9 INJECTION, SOLUTION INTRAVENOUS at 18:11

## 2025-01-22 RX ADMIN — ACETAMINOPHEN 1000 MG: 500 TABLET, FILM COATED ORAL at 13:37

## 2025-01-22 RX ADMIN — PIPERACILLIN AND TAZOBACTAM 4500 MG: 4; .5 INJECTION, POWDER, LYOPHILIZED, FOR SOLUTION INTRAVENOUS at 18:12

## 2025-01-22 RX ADMIN — FENTANYL CITRATE 100 MCG: 50 INJECTION, SOLUTION INTRAMUSCULAR; INTRAVENOUS at 13:35

## 2025-01-22 RX ADMIN — ROPIVACAINE HYDROCHLORIDE 10 ML: 5 INJECTION, SOLUTION EPIDURAL; INFILTRATION; PERINEURAL at 13:38

## 2025-01-22 RX ADMIN — CETIRIZINE HYDROCHLORIDE 10 MG: 10 TABLET, FILM COATED ORAL at 22:09

## 2025-01-22 RX ADMIN — PROPOFOL 50 MG: 10 INJECTION, EMULSION INTRAVENOUS at 15:23

## 2025-01-22 ASSESSMENT — LIFESTYLE VARIABLES: SMOKING_STATUS: 1

## 2025-01-22 ASSESSMENT — PAIN SCALES - GENERAL: PAINLEVEL_OUTOF10: 0

## 2025-01-22 NOTE — OP NOTE
Operative Note      Patient: Nilay Whittaker  YOB: 1970  MRN: [id number]    Date of Procedure: 1/22/2025    Pre-Op Diagnosis Codes:      * Left foot diabetic infection    Post-Op Diagnosis: Same       Procedure(s):  Left foot wound debridement  Left fourth metatarsal bone excision for infection  Application of wound VAC to left foot    Surgeon(s):  Juan Luis Bravo MD    Assistant:   Surgical Assistant: Satish Zuñiga    Anesthesia: Regional    Estimated Blood Loss (mL): less than 50     Complications: None    Specimens:   ID Type Source Tests Collected by Time Destination   1 : LEFT FOOT WOUND Swab Foot CULTURE, ANAEROBIC, CULTURE, FUNGUS, CULTURE, WOUND (WITH GRAM STAIN) Juan Luis Bravo MD 1/22/2025 1531    2 : LEFT FOOT WOUND Swab Foot CULTURE, ANAEROBIC, CULTURE, FUNGUS, CULTURE, WOUND (WITH GRAM STAIN) Juan Luis Bravo MD 1/22/2025 1531        Implants:  * No implants in log *      Drains: * No LDAs found *    Findings:  Infection Present At Time Of Surgery (PATOS) (choose all levels that have infection present):  - Deep Infection (muscle/fascia) present as evidenced by abscess, pus, purulent fluid, and phlegmon underneath the fourth metatarsal head that extended down into the bone.  Significant mount of dead necrotic and smelly tissue  Other Findings: Abscess tunnel proximally into the webspace of the third and fourth toes.  After debridement the wound measured approximately 2 cm x 3 cm by 1 cm.  Wound VAC was applied.    Detailed Description of Procedure:     The patient was brought back to the operating room and prepped and draped in normal sterile fashion.  Timeout was taken to identify laterality and procedure being performed.  Antibiotics were initially held.    Large wound underneath the foot was identified at the level of fourth metatarsal head.  It measured approximately 2 cm x 2 cm and tracked all way down to the bone.  Cultures were taken and then antibiotics were  administered.    Using a 15 blade knife a curette and a rongeur I performed an excisional debridement of skin, subcutaneous tissue, deep tissue, fascia and muscle tendon and ligament.  There is a significant amount of infection and dead necrotic tissue with this rank smell.  After significant excisional debridement of all the soft tissue.  The bone was obviously infected with significant purulence and deep necrotic tissue.  Therefore using a rongeur and a bone cutter I excised the fourth metatarsal and debrided this bone as it was infected.    I then continued to copiously irrigate out the wound with normal saline and using a curette continue to form excision debridement of subcutaneous tissue as the wound tunneled into the webspace.  After full excision of all necrotic and unhealthy tissue was able get the bleeding edges.    A wound VAC was then applied.  The wound VAC measured 2 cm x 3 cm x 1 cm.  The sponge was inserted, good seal was obtained.  Dressing was then applied the patient was sent back to the recovery room    Orthopedic plan of care  Patient had signs and symptoms of sepsis and systemic SIRS.  Febrile, tachycardic and tachypneic.  Hospitalist will admit  Patient will be started on vancomycin and Rocephin.  Infectious disease to be consulted  Wound care nurse consulted to have wound VAC changed every 2 to 3 days.  This will be definitive wound closure.  He can heel weight-bear for transfers and short distances  No more planned surgery at this point.  However in a couple weeks we will have to reevaluate the wound to see how much more necrotic tissue needs to be debrided.      Electronically signed by Juan Luis Bravo MD on 1/22/2025 at 4:30 PM

## 2025-01-22 NOTE — PERIOP NOTE
TRANSFER - OUT REPORT:    Verbal report given to Corina ISLAS on Nilay Whittaker  being transferred to [facility] for routine post-op       Report consisted of patient's Situation, Background, Assessment and   Recommendations(SBAR).     Information from the following report(s) Nurse Handoff Report, Surgery Report, MAR, Recent Results, Cardiac Rhythm SR, and Event Log was reviewed with the receiving nurse.           Lines:   Peripheral IV 01/22/25 Right;Anterior Hand (Active)   Site Assessment Clean, dry & intact 01/22/25 1630   Phlebitis Assessment No symptoms 01/22/25 1630   Infiltration Assessment 0 01/22/25 1630        Opportunity for questions and clarification was provided.      Patient transported with:  O2 @ 4lpm

## 2025-01-22 NOTE — ANESTHESIA PRE PROCEDURE
Department of Anesthesiology  Preprocedure Note       Name:  Nilay Whittaker   Age:  54 y.o.  :  1970                                          MRN:  [id number]         Date:  2025      Surgeon: Surgeon(s):  Juan Luis Bravo MD    Procedure: Procedure(s):  LEFT FOOT DEBRIDEMENT INCISION AND DRAINAGE    Medications prior to admission:   Prior to Admission medications    Medication Sig Start Date End Date Taking? Authorizing Provider   oxyCODONE (ROXICODONE) 5 MG immediate release tablet Take 1 tablet by mouth every 6 hours as needed for Pain for up to 5 days. Intended supply: 5 days. Take lowest dose possible to manage pain Max Daily Amount: 20 mg 25  Griselda, SOLOMON Grace   ondansetron (ZOFRAN) 4 MG tablet Take 1 tablet by mouth daily as needed for Nausea or Vomiting 25   Stefania Villalobos PA   docusate sodium (COLACE) 100 MG capsule Take 1 capsule by mouth daily as needed for Constipation 25   Stefania Villalobos PA   ciprofloxacin (CIPRO) 500 MG tablet Take 1 tablet by mouth 2 times daily for 7 days 25  Lele Correia MD   clindamycin (CLEOCIN) 300 MG capsule Take 1 capsule by mouth 4 times daily for 7 days 25  Lele Correia MD   docusate sodium (COLACE) 100 MG capsule Take 1 capsule by mouth daily as needed for Constipation 24   Stefania Villalobos PA   ondansetron (ZOFRAN) 4 MG tablet Take 1 tablet by mouth daily as needed for Nausea or Vomiting 24   Griselda, SOLOMON Grace   Tirzepatide (MOUNJARO) 2.5 MG/0.5ML SOPN SC injection Inject 0.5 mLs into the skin once a week Hold for procedure.    Vi Bolanos MD   metoprolol succinate (TOPROL XL) 50 MG extended release tablet Take 1 tablet by mouth nightly    Vi Bolanos MD   metFORMIN (GLUCOPHAGE) 1000 MG tablet Take 1 tablet by mouth 2 times daily (with meals)    Vi Bolanos MD   omeprazole (PRILOSEC) 20 MG delayed release capsule Take 2

## 2025-01-22 NOTE — H&P
Hospitalist History and Physical   Admit Date:  2025 12:25 PM   Name:  Nilay Whittaker   Age:  54 y.o.  Sex:  male  :  1970   MRN:  [id number]   Room:  [facility]/    Presenting/Chief Complaint: No chief complaint on file.     Reason(s) for Admission: Left foot pain [M79.672]  S/P foot surgery [Z98.890]  Sepsis (HCC) [A41.9]     History of Present Illness:     Nilay Whittaker is a 54 y.o. male with medical history of DM2, ongoing tobacco use, HTN, left Dm2 foot wound and left 5th toe amputation, CAD, followed by ortho Dr. Bravo evaluated with concerns for sepsis after going to the OR 25 for washout. I personally spoke with Dr. Bravo and accepted transfer for admit from PACU.     Prior chronic cipro for foot wound   Followed by ID  Prior ECOLI, MRSA and pseudomonas     S/p 25 \"    Left foot wound debridement  Left fourth metatarsal bone excision for infection  Application of wound VAC to left foot\"      FULL CODE    Lives with wife Lissett     Assessment & Plan:     Principal Problem:    Sepsis (HCC)  Plan:     Left foot pain  Plan:     S/P foot surgery  Plan:   Left Dm2 foot wound   Admit to remote tele   D1 zyvox and zosyn   Followup blood cultures   ID consult  Ortho following   BC x 2  Lactate check  CBC, CMP now         Active Problems:    HTN (hypertension)  Plan:   Holding home meds due to lower Bps  Resume as needed           Diabetes mellitus (HCC)  Plan:   SSI           Tobacco use disorder  Plan:   Needs cessation           CAD (coronary artery disease)  Plan:   Crestor       PT/OT evals ordered?  Not ordered; patient not expected to need rehab  Diet: No diet orders on file  VTE prophylaxis: Lovenox  Code status: Full Code  Code status discussed: Yes  Blood consent obtained: No      Non-peripheral Lines and Tubes (if present):             Hospital Problems:  Principal Problem:    Sepsis (HCC)  Active Problems:    HTN (hypertension)    Diabetes mellitus (HCC)    Tobacco use

## 2025-01-22 NOTE — PROGRESS NOTES
4 Eyes Skin Assessment     NAME:  Nilay Whittaker  YOB: 1970  MEDICAL RECORD NUMBER:  [id number]    The patient is being assessed for  Admission    I agree that at least one RN has performed a thorough Head to Toe Skin Assessment on the patient. ALL assessment sites listed below have been assessed.      Areas assessed by both nurses:    Sacrum. Buttock, Coccyx, Ischium        Does the Patient have a Wound? No noted wound(s)       Marquis Prevention initiated by RN: Yes  Wound Care Orders initiated by RN: No    Pressure Injury (Stage 3,4, Unstageable, DTI, NWPT, and Complex wounds) if present, place Wound referral order by RN under : No    New Ostomies, if present place, Ostomy referral order under : No     Nurse 1 eSignature: Electronically signed by Corina Miguel RN on 1/22/25 at 6:21 PM EST    **SHARE this note so that the co-signing nurse can place an eSignature**    Nurse 2 eSignature: Electronically signed by NIGEL SCHULTE RN on 1/22/25 at 6:22 PM EST

## 2025-01-22 NOTE — ANESTHESIA PROCEDURE NOTES
Peripheral Block    Patient location during procedure: pre-op  Reason for block: post-op pain management and at surgeon's request  Start time: 1/22/2025 1:38 PM  End time: 1/22/2025 1:40 PM  Staffing  Performed: anesthesiologist   Anesthesiologist: Nilay Flores MD  Performed by: Nilay Flores MD  Authorized by: Nilay Flores MD    Preanesthetic Checklist  Completed: patient identified, IV checked, site marked, risks and benefits discussed, surgical/procedural consents, equipment checked, pre-op evaluation, timeout performed, anesthesia consent given, oxygen available, monitors applied/VS acknowledged, fire risk safety assessment completed and verbalized and blood product R/B/A discussed and consented  Peripheral Block   Patient position: supine  Prep: ChloraPrep  Provider prep: mask and sterile gloves  Patient monitoring: responsive to questions, oxygen, IV access, frequent blood pressure checks, continuous pulse ox and cardiac monitor  Block type: Femoral  Adductor canal  Laterality: left  Injection technique: single-shot  Guidance: ultrasound guided    Needle   Needle type: insulated echogenic nerve stimulator needle   Needle gauge: 21 G  Needle localization: ultrasound guidance  Needle length: 10 cm  Assessment   Injection assessment: negative aspiration for heme, no paresthesia on injection, local visualized surrounding nerve on ultrasound, no intravascular symptoms and low pressure verified by pressure monitor  Paresthesia pain: none  Slow fractionated injection: yes  Hemodynamics: stable  Outcomes: uncomplicated and patient tolerated procedure well    Medications Administered  ropivacaine (NAROPIN) injection 0.5% - Perineural   10 mL - 1/22/2025 1:38:00 PM

## 2025-01-22 NOTE — ANESTHESIA POSTPROCEDURE EVALUATION
Department of Anesthesiology  Postprocedure Note    Patient: Nilay Whittaker  MRN: [id number]  YOB: 1970  Date of evaluation: 1/22/2025    Procedure Summary       Date: 01/22/25 Room / Location: [facility] OP OR 01 / [facility]    Anesthesia Start: 1512 Anesthesia Stop: 1607    Procedure: LEFT FOOT DEBRIDEMENT INCISION AND DRAINAGE (Left: Foot) Diagnosis:       Left foot pain      (Left foot pain [M79.672])    Surgeons: Juan Luis Bravo MD Responsible Provider: Nilay Flores MD    Anesthesia Type: TIVA ASA Status: 3            Anesthesia Type: No value filed.    Galo Phase I: Galo Score: 7    Galo Phase II:      Anesthesia Post Evaluation    Patient location during evaluation: PACU  Patient participation: complete - patient participated  Level of consciousness: awake and alert  Pain score: 1  Airway patency: patent  Nausea & Vomiting: no nausea  Cardiovascular status: blood pressure returned to baseline and hemodynamically stable  Respiratory status: acceptable  Hydration status: euvolemic  Multimodal analgesia pain management approach  Pain management: adequate and satisfactory to patient    No notable events documented.

## 2025-01-22 NOTE — PROGRESS NOTES
TRANSFER - IN REPORT:    Verbal report received from Nathalie ISLAS on Nilay Whittaker  being received from PACU for routine progression of patient care      Report consisted of patient's Situation, Background, Assessment and   Recommendations(SBAR).     Information from the following report(s) Nurse Handoff Report was reviewed with the receiving nurse.    Opportunity for questions and clarification was provided.      Assessment will be completed upon patient's arrival to unit and care assumed.

## 2025-01-22 NOTE — ANESTHESIA PROCEDURE NOTES
Peripheral Block    Patient location during procedure: pre-op  Reason for block: post-op pain management and at surgeon's request  Start time: 1/22/2025 1:35 PM  End time: 1/22/2025 1:38 PM  Staffing  Performed: anesthesiologist   Anesthesiologist: Nilay Flores MD  Performed by: Nilay Flores MD  Authorized by: Nilay Flores MD    Preanesthetic Checklist  Completed: patient identified, IV checked, site marked, risks and benefits discussed, surgical/procedural consents, equipment checked, pre-op evaluation, timeout performed, anesthesia consent given, oxygen available, monitors applied/VS acknowledged, fire risk safety assessment completed and verbalized and blood product R/B/A discussed and consented  Peripheral Block   Patient position: supine  Prep: ChloraPrep  Provider prep: mask and sterile gloves  Patient monitoring: IV access, oxygen, responsive to questions, cardiac monitor, continuous pulse ox and frequent blood pressure checks  Block type: Sciatic  Popliteal  Laterality: left  Injection technique: single-shot  Guidance: ultrasound guided    Needle   Needle type: insulated echogenic nerve stimulator needle   Needle gauge: 21 G  Needle localization: ultrasound guidance  Needle length: 10 cm  Assessment   Injection assessment: no intravascular symptoms, low pressure verified by pressure monitor, no paresthesia on injection, negative aspiration for heme and local visualized surrounding nerve on ultrasound  Paresthesia pain: none  Slow fractionated injection: yes  Hemodynamics: stable  Outcomes: uncomplicated and patient tolerated procedure well    Medications Administered  ropivacaine (NAROPIN) injection 0.5% - Perineural   25 mL - 1/22/2025 1:35:00 PM

## 2025-01-23 LAB
EKG ATRIAL RATE: 84 BPM
EKG DIAGNOSIS: NORMAL
EKG P AXIS: 35 DEGREES
EKG P-R INTERVAL: 154 MS
EKG Q-T INTERVAL: 368 MS
EKG QRS DURATION: 94 MS
EKG QTC CALCULATION (BAZETT): 434 MS
EKG R AXIS: 62 DEGREES
EKG T AXIS: 58 DEGREES
EKG VENTRICULAR RATE: 84 BPM
EST. AVERAGE GLUCOSE BLD GHB EST-MCNC: 197 MG/DL
GLUCOSE BLD STRIP.AUTO-MCNC: 166 MG/DL (ref 65–100)
GLUCOSE BLD STRIP.AUTO-MCNC: 173 MG/DL (ref 65–100)
GLUCOSE BLD STRIP.AUTO-MCNC: 183 MG/DL (ref 65–100)
GLUCOSE BLD STRIP.AUTO-MCNC: 265 MG/DL (ref 65–100)
HBA1C MFR BLD: 8.5 % (ref 0–5.6)
SERVICE CMNT-IMP: ABNORMAL

## 2025-01-23 PROCEDURE — 2580000003 HC RX 258: Performed by: INTERNAL MEDICINE

## 2025-01-23 PROCEDURE — 83036 HEMOGLOBIN GLYCOSYLATED A1C: CPT

## 2025-01-23 PROCEDURE — 2500000003 HC RX 250 WO HCPCS: Performed by: INTERNAL MEDICINE

## 2025-01-23 PROCEDURE — 36415 COLL VENOUS BLD VENIPUNCTURE: CPT

## 2025-01-23 PROCEDURE — 6370000000 HC RX 637 (ALT 250 FOR IP): Performed by: INTERNAL MEDICINE

## 2025-01-23 PROCEDURE — 6370000000 HC RX 637 (ALT 250 FOR IP): Performed by: NURSE PRACTITIONER

## 2025-01-23 PROCEDURE — 93010 ELECTROCARDIOGRAM REPORT: CPT | Performed by: INTERNAL MEDICINE

## 2025-01-23 PROCEDURE — 1100000000 HC RM PRIVATE

## 2025-01-23 PROCEDURE — 82962 GLUCOSE BLOOD TEST: CPT

## 2025-01-23 PROCEDURE — 6360000002 HC RX W HCPCS: Performed by: INTERNAL MEDICINE

## 2025-01-23 RX ORDER — METHOCARBAMOL 750 MG/1
750 TABLET, FILM COATED ORAL 4 TIMES DAILY PRN
Status: DISCONTINUED | OUTPATIENT
Start: 2025-01-23 | End: 2025-01-25 | Stop reason: HOSPADM

## 2025-01-23 RX ORDER — INSULIN GLARGINE 100 [IU]/ML
5 INJECTION, SOLUTION SUBCUTANEOUS NIGHTLY
Status: DISCONTINUED | OUTPATIENT
Start: 2025-01-23 | End: 2025-01-25 | Stop reason: HOSPADM

## 2025-01-23 RX ORDER — NICOTINE 21 MG/24HR
1 PATCH, TRANSDERMAL 24 HOURS TRANSDERMAL DAILY PRN
Status: DISCONTINUED | OUTPATIENT
Start: 2025-01-23 | End: 2025-01-25 | Stop reason: HOSPADM

## 2025-01-23 RX ADMIN — DICLOFENAC SODIUM 4 G: 10 GEL TOPICAL at 21:55

## 2025-01-23 RX ADMIN — PANTOPRAZOLE SODIUM 40 MG: 40 TABLET, DELAYED RELEASE ORAL at 05:48

## 2025-01-23 RX ADMIN — ENOXAPARIN SODIUM 40 MG: 100 INJECTION SUBCUTANEOUS at 20:22

## 2025-01-23 RX ADMIN — INSULIN GLARGINE 5 UNITS: 100 INJECTION, SOLUTION SUBCUTANEOUS at 21:54

## 2025-01-23 RX ADMIN — SODIUM CHLORIDE, PRESERVATIVE FREE 10 ML: 5 INJECTION INTRAVENOUS at 07:56

## 2025-01-23 RX ADMIN — INSULIN LISPRO 2 UNITS: 100 INJECTION, SOLUTION INTRAVENOUS; SUBCUTANEOUS at 21:54

## 2025-01-23 RX ADMIN — ROSUVASTATIN CALCIUM 40 MG: 20 TABLET, FILM COATED ORAL at 18:10

## 2025-01-23 RX ADMIN — INSULIN LISPRO 1 UNITS: 100 INJECTION, SOLUTION INTRAVENOUS; SUBCUTANEOUS at 11:29

## 2025-01-23 RX ADMIN — CETIRIZINE HYDROCHLORIDE 10 MG: 10 TABLET, FILM COATED ORAL at 20:23

## 2025-01-23 RX ADMIN — LINEZOLID 600 MG: 600 INJECTION, SOLUTION INTRAVENOUS at 20:26

## 2025-01-23 RX ADMIN — PIPERACILLIN AND TAZOBACTAM 3375 MG: 3; .375 INJECTION, POWDER, LYOPHILIZED, FOR SOLUTION INTRAVENOUS at 16:00

## 2025-01-23 RX ADMIN — LINEZOLID 600 MG: 600 INJECTION, SOLUTION INTRAVENOUS at 05:45

## 2025-01-23 RX ADMIN — PIPERACILLIN AND TAZOBACTAM 3375 MG: 3; .375 INJECTION, POWDER, LYOPHILIZED, FOR SOLUTION INTRAVENOUS at 07:57

## 2025-01-23 RX ADMIN — METHOCARBAMOL TABLETS 750 MG: 750 TABLET, COATED ORAL at 18:10

## 2025-01-23 RX ADMIN — ENOXAPARIN SODIUM 40 MG: 100 INJECTION SUBCUTANEOUS at 07:55

## 2025-01-23 RX ADMIN — PIPERACILLIN AND TAZOBACTAM 3375 MG: 3; .375 INJECTION, POWDER, LYOPHILIZED, FOR SOLUTION INTRAVENOUS at 00:25

## 2025-01-23 RX ADMIN — MONTELUKAST 10 MG: 10 TABLET, FILM COATED ORAL at 20:23

## 2025-01-23 NOTE — PROGRESS NOTES
[facility]   Progress Note    Patient: Nilay Whittaker MRN: [id number]  SSN: [id number]    YOB: 1970  Age: 54 y.o.  Sex: male      Admit Date: 1/22/2025    LOS: 1 day     Plan of Care:   1: WB status - Heel WB  2: DVT px -  per primary (rec at least asa 81bid)  3: continue to follow cultures  4: wound care consulted for wvac changes q2-3 days.  Will need wvac to dc home with.    Once home health [facility] and ID makes final abx recs, patient can dc home.          Subjective:     Resting in bed  Still some chills    Objective:     Vitals:    01/22/25 1743 01/22/25 2239 01/23/25 0252 01/23/25 0724   BP: 114/68 (!) 106/58 136/63 109/64   Pulse: 72 82 85 84   Resp: 17 16 18 16   Temp: 97.7 °F (36.5 °C) 98.1 °F (36.7 °C) 99.1 °F (37.3 °C) 97.7 °F (36.5 °C)   TempSrc:  Oral Oral Oral   SpO2: 92% 91% 92% 91%   Weight:            Intake and Output:  Current Shift: No intake/output data recorded.  Last three shifts: 01/21 1901 - 01/23 0700  In: -   Out: 1335 [Urine:1325]    Physical Exam:   Wvac with good seal. No signs of spreading infection    Lab/Data Review:  Lab Results   Component Value Date    WBC 16.1 (H) 01/22/2025    HGB 13.5 (L) 01/22/2025    HCT 41.2 01/22/2025    MCV 85.5 01/22/2025     01/22/2025     Lab Results   Component Value Date/Time     01/22/2025 08:45 PM    K 3.9 01/22/2025 08:45 PM     01/22/2025 08:45 PM    CO2 22 01/22/2025 08:45 PM    BUN 17 01/22/2025 08:45 PM    CREATININE 0.95 01/22/2025 08:45 PM    GLUCOSE 139 01/22/2025 08:45 PM    CALCIUM 8.8 01/22/2025 08:45 PM      Results       Procedure Component Value Units Date/Time    Culture, Blood 2 [[id number]] Collected: 01/22/25 2131    Order Status: Completed Specimen: Blood Updated: 01/23/25 0746     Special Requests --        LEFT  Antecubital       Culture NO GROWTH AFTER 10 HOURS       Culture, Blood 1 [[id number]] Collected: 01/22/25 2054    Order Status: Completed

## 2025-01-23 NOTE — CARE COORDINATION
Chart reviewed by  for potential transition of care (KARTHIK) needs or concerns.  Pt is insured with pharmacy benefits and is established with a PCP.  Wound vac form received from wound care RN.  Form completed and awaiting ortho provider signature.  CM will submit to [employer] when signed.  HH SN referral submitted to [facility].  ID consulted for possible IV ABX. Preliminary referral submitted to [facility] to review for home infusion benefits.  CM following. Please notify/consult  if other KARTHIK needs arise.       01/23/25 0567   Service Assessment   Patient Orientation Alert and Oriented   Cognition Alert   History Provided By Medical Record   Primary Caregiver Self   Accompanied By/Relationship spouse   Support Systems Spouse/Significant Other   Patient's Healthcare Decision Maker is: Legal Next of Kin   PCP Verified by CM Yes   Last Visit to PCP Within last 3 months  (12/10/2024)   Prior Functional Level Independent in ADLs/IADLs   Current Functional Level Independent in ADLs/IADLs   Can patient return to prior living arrangement Yes   Ability to make needs known: Good   Family able to assist with home care needs: Yes   Would you like for me to discuss the discharge plan with any other family members/significant others, and if so, who? No   Financial Resources Other (Comment)  ([employer] commercial policy)   Community Resources None   Social/Functional History   Lives With Spouse   Type of Home House   Prior Level of Assist for ADLs Independent   Prior Level of Assist for Homemaking Independent   Homemaking Responsibilities Yes   Ambulation Assistance Independent   Prior Level of Assist for Transfers Independent   Active  Yes   Occupation Full time employment   Type of Occupation    Discharge Planning   Type of Residence House   Living Arrangements Spouse/Significant Other   Current Services Prior To Admission Durable Medical Equipment   Current DME Prior to Arrival

## 2025-01-23 NOTE — PROGRESS NOTES
Hospitalist Progress Note   Admit Date:  2025 12:25 PM   Name:  Nilay Whittaker   Age:  54 y.o.  Sex:  male  :  1970   MRN:  [id number]   Room:  [facility]/    Presenting/Chief Complaint: No chief complaint on file.     Reason(s) for Admission: Left foot pain [M79.672]  S/P foot surgery [Z98.890]  Sepsis (HCC) [A41.9]     Hospital Course:   Nilay Whittaker is a 54 y.o. male with DM2, smoking, HTN, and chronic left foot wound due to diabetes admitted on  s/p left foot abscess washout with fourth metatarsal excision and wound VAC placement.    Subjective & 24hr Events:   States he feels good today.  Left foot with minimal pain.  Complains of back pain and spasms.  Denies chest pain.  Denies shortness of breath.  Denies fevers.    Assessment & Plan:     Principal Problem:    Diabetic foot infection (HCC)    Left foot pain    S/P foot surgery  S/p I&D with fourth metatarsal incision on   Per primary team  Continue Zyvox 600 mg p.o. every  Continue Zosyn 3.375 mg every 8 hours  Wound care per wound nurse    Active Problems:    Sepsis (HCC)   WBC 16.1 + HR 93 + left foot infection  S/p I&D with fourth metatarsal incision on   Continue Zyvox 600 mg p.o. every  Continue Zosyn 3.375 mg every 8 hours  Infectious disease following  Wound care per wound nurse      HTN (hypertension)  Blood pressure currently stable      Type 2 diabetes mellitus (HCC)  Blood sugars slightly elevated today  Start Lantus 5 units nightly this evening  Continue Humalog SSI      Tobacco use disorder  And nicotine patch daily      CAD (coronary artery disease)  No acute chest pain  Continue Crestor 40 mg nightly      Anticipated Discharge Arrangements:   Home Health    PT/OT evals ordered?  Therapy evals ordered  Diet:  ADULT DIET; Regular; 3 carb choices (45 gm/meal)  VTE prophylaxis: Defer to primary team  Code status: Full Code      Non-peripheral Lines and Tubes (if present):          Telemetry (if

## 2025-01-23 NOTE — CONSULTS
Infectious Disease Consult    Today's Date: 1/23/2025   Admit Date: 1/22/2025 1970    ImpressionPlan:   Left foot 4th MT head OM: s/p (1/22) excisional debridement, op cxs + GNR, however patient was taking PO cipro/clindamycin      Known to ID for previous:  Right foot infection: s/p (10/9/24) I&D and right fifth toe metatarsal bone and phalangeal base excision-Op cx positive for E.coli, Pseudomonas (Fluoroquinolone-S), MRSA (Tetracycline & Bactrim-R), E.faecalis- s/p dalbavancin/cipro  left foot 5th MT head OM, previous cx from MT head resection (4/30/24) with MRSA  S/p (5/20/24) exc-debridement, 5th toe amputation and 5th MT shaft Superficial cx with MSSA and Enterococcus     DM2: A1C 8.5    Total time spent with the patient today was 45 minutes, including review of medical records, face-to-face consultation, and post-visit documentation.    Anti-infectives:   Zosyn  Zyvox    Subjective:   Date of Consultation:  January 23, 2025  Referring Physician: Dr. Yip    Patient is a 54 y.o. male who presented for planned I&D of left foot wound. He presented to ED for this wound on 1/18 and was discharged with Cipro and Clindamycin and was sent to Dr. Bravo for evaluation. He saw Radha Morejon on 1/21 and it was decided that he should be admitted for surgical intervention. He was taken to the OR on 1/22 where he underwent left foot debridement and left 4th MT bone excision, op cxs with GNRs. He is currently on Zosyn and Zyvox. ID has been consulted for assistance. Patient seen sitting up in bed with wife at bedside.    He is known to ID for bilateral DFI/OM from last year.     Patient Active Problem List   Diagnosis    HTN (hypertension)    Status post total knee replacement    HLD (hyperlipidemia)    Osteomyelitis of fifth toe of left foot    Amputation of fifth toe of left foot (HCC)    Wound dehiscence, surgical    Acute osteomyelitis of right foot    Diabetes mellitus (HCC)    Diabetic polyneuropathy

## 2025-01-23 NOTE — NURSE NAVIGATOR
This RN Navigator introduced self to patient and/or family at patient bedside. Patient informed that RN Navigator will be following patient for at least 30 days post discharge to act as a resource for any needs that may arise following discharge in relation to their sepsis diagnosis and treatment.     Patient verbalizes understanding of generalized education of sepsis disease process, s/s to monitor for and when to contact physician or visit Emergency Department.   Sepsis education sheet given to patient with this RN Navigator contact information. Patient informed they will be contacted 48-72 hours following discharge.   Contact information verified by patient and/or family member. RN Navigator will continue to follow.         Patient is resting comfortably in bed when introduced, wife at bedside. Generalized education reviewed with patient and spouse re: s/s of sepsis and when to contact physician. Patient is to be discharged home with spouse at a future date. Patient has PCP that he would like to continue with.

## 2025-01-23 NOTE — WOUND CARE
Noted patient has wound vac placed by Dr. Bravo yesterday. Papers for home machine started and given to case management. No alarms on vac at this time. Plan to change tomorrow.

## 2025-01-24 LAB
BACTERIA SPEC CULT: NORMAL
BACTERIA SPEC CULT: NORMAL
GLUCOSE BLD STRIP.AUTO-MCNC: 170 MG/DL (ref 65–100)
GLUCOSE BLD STRIP.AUTO-MCNC: 175 MG/DL (ref 65–100)
GLUCOSE BLD STRIP.AUTO-MCNC: 197 MG/DL (ref 65–100)
GLUCOSE BLD STRIP.AUTO-MCNC: 226 MG/DL (ref 65–100)
SERVICE CMNT-IMP: ABNORMAL
SERVICE CMNT-IMP: NORMAL
SERVICE CMNT-IMP: NORMAL

## 2025-01-24 PROCEDURE — 6370000000 HC RX 637 (ALT 250 FOR IP): Performed by: INTERNAL MEDICINE

## 2025-01-24 PROCEDURE — 82962 GLUCOSE BLOOD TEST: CPT

## 2025-01-24 PROCEDURE — 6360000002 HC RX W HCPCS: Performed by: INTERNAL MEDICINE

## 2025-01-24 PROCEDURE — 2500000003 HC RX 250 WO HCPCS: Performed by: INTERNAL MEDICINE

## 2025-01-24 PROCEDURE — 97597 DBRDMT OPN WND 1ST 20 CM/<: CPT

## 2025-01-24 PROCEDURE — 1100000000 HC RM PRIVATE

## 2025-01-24 PROCEDURE — 16020 DRESS/DEBRID P-THICK BURN S: CPT

## 2025-01-24 PROCEDURE — 97605 NEG PRS WND THER DME<=50SQCM: CPT

## 2025-01-24 PROCEDURE — 2580000003 HC RX 258: Performed by: INTERNAL MEDICINE

## 2025-01-24 RX ORDER — MORPHINE SULFATE 4 MG/ML
4 INJECTION, SOLUTION INTRAMUSCULAR; INTRAVENOUS ONCE
Status: COMPLETED | OUTPATIENT
Start: 2025-01-24 | End: 2025-01-24

## 2025-01-24 RX ADMIN — INSULIN LISPRO 1 UNITS: 100 INJECTION, SOLUTION INTRAVENOUS; SUBCUTANEOUS at 21:39

## 2025-01-24 RX ADMIN — ENOXAPARIN SODIUM 40 MG: 100 INJECTION SUBCUTANEOUS at 08:11

## 2025-01-24 RX ADMIN — PIPERACILLIN AND TAZOBACTAM 3375 MG: 3; .375 INJECTION, POWDER, LYOPHILIZED, FOR SOLUTION INTRAVENOUS at 15:36

## 2025-01-24 RX ADMIN — MONTELUKAST 10 MG: 10 TABLET, FILM COATED ORAL at 21:38

## 2025-01-24 RX ADMIN — LINEZOLID 600 MG: 600 INJECTION, SOLUTION INTRAVENOUS at 21:46

## 2025-01-24 RX ADMIN — PIPERACILLIN AND TAZOBACTAM 3375 MG: 3; .375 INJECTION, POWDER, LYOPHILIZED, FOR SOLUTION INTRAVENOUS at 09:12

## 2025-01-24 RX ADMIN — PANTOPRAZOLE SODIUM 40 MG: 40 TABLET, DELAYED RELEASE ORAL at 05:50

## 2025-01-24 RX ADMIN — PIPERACILLIN AND TAZOBACTAM 3375 MG: 3; .375 INJECTION, POWDER, LYOPHILIZED, FOR SOLUTION INTRAVENOUS at 00:47

## 2025-01-24 RX ADMIN — SODIUM CHLORIDE, PRESERVATIVE FREE 5 ML: 5 INJECTION INTRAVENOUS at 08:11

## 2025-01-24 RX ADMIN — DICLOFENAC SODIUM 4 G: 10 GEL TOPICAL at 08:10

## 2025-01-24 RX ADMIN — SODIUM CHLORIDE, PRESERVATIVE FREE 10 ML: 5 INJECTION INTRAVENOUS at 21:39

## 2025-01-24 RX ADMIN — MORPHINE SULFATE 4 MG: 4 INJECTION INTRAVENOUS at 09:59

## 2025-01-24 RX ADMIN — INSULIN LISPRO 1 UNITS: 100 INJECTION, SOLUTION INTRAVENOUS; SUBCUTANEOUS at 16:51

## 2025-01-24 RX ADMIN — ROSUVASTATIN CALCIUM 40 MG: 20 TABLET, FILM COATED ORAL at 16:51

## 2025-01-24 RX ADMIN — METHOCARBAMOL TABLETS 750 MG: 750 TABLET, COATED ORAL at 09:36

## 2025-01-24 RX ADMIN — LINEZOLID 600 MG: 600 INJECTION, SOLUTION INTRAVENOUS at 08:08

## 2025-01-24 RX ADMIN — CETIRIZINE HYDROCHLORIDE 10 MG: 10 TABLET, FILM COATED ORAL at 21:38

## 2025-01-24 RX ADMIN — INSULIN GLARGINE 5 UNITS: 100 INJECTION, SOLUTION SUBCUTANEOUS at 21:38

## 2025-01-24 RX ADMIN — ENOXAPARIN SODIUM 40 MG: 100 INJECTION SUBCUTANEOUS at 21:39

## 2025-01-24 ASSESSMENT — PAIN SCALES - GENERAL: PAINLEVEL_OUTOF10: 0

## 2025-01-24 NOTE — PROGRESS NOTES
Hospitalist Progress Note   Admit Date:  2025 12:25 PM   Name:  Nilay Whittaker   Age:  54 y.o.  Sex:  male  :  1970   MRN:  [id number]   Room:  4/    Presenting/Chief Complaint: No chief complaint on file.     Reason(s) for Admission: Left foot pain [M79.672]  S/P foot surgery [Z98.890]  Sepsis (HCC) [A41.9]     Hospital Course:   Nilay Whittaker is a 54 y.o. male with DM2, smoking, HTN, and chronic left foot wound due to diabetes admitted on  s/p left foot abscess washout with fourth metatarsal excision and wound VAC placement.    Subjective & 24hr Events:   Asking to go home.  Left foot pain 8/10 with wound dressing changes, but 4/10 after dressing changes.  Denies fevers.  Denies shortness of breath.  Denies N/V    Assessment & Plan:     Principal Problem:    Diabetic foot infection (HCC)    Left foot pain    S/P foot surgery  S/p I&D with fourth metatarsal incision on   Per primary team  Continue Zyvox 600 mg p.o. every  Continue Zosyn 3.375 mg every 8 hours  Awaiting final infectious disease recommendations since wound culture appears polymicrobial  Wound VAC in place  Wound care per wound nurse    Active Problems:    Sepsis (HCC)   WBC 16.1 + HR 93 + left foot infection  S/p I&D with fourth metatarsal incision on  resolved  Continue Zyvox 600 mg p.o. every  Continue Zosyn 3.375 mg every 8 hours  Infectious disease following  Wound care per wound nurse      HTN (hypertension)  Blood pressure currently stable      Type 2 diabetes mellitus (HCC)  Blood sugars slightly elevated today  Start Lantus 5 units nightly this evening  Continue Humalog SSI      Tobacco use disorder  And nicotine patch daily      CAD (coronary artery disease)  No acute chest pain  Continue Crestor 40 mg nightly      Anticipated Discharge Arrangements:   Home Health    PT/OT evals ordered?  Therapy evals ordered  Diet:  ADULT DIET; Regular; 3 carb choices (45 gm/meal)  VTE

## 2025-01-24 NOTE — CARE COORDINATION
Signed wound vac form received and faxed, along with face sheet and supporting clinical information, to [facility].  They have confirmed receipt of the order and it is in process.  Awaiting OPAT orders.  [facility] confirmed pt has home infusion benefits and will have no upfront costs.  CM to continue to follow.

## 2025-01-24 NOTE — PROGRESS NOTES
Infectious Disease Progress Note    Today's Date: 1/24/2025   Admit Date: 1/22/2025 1970    ImpressionPlan:   Left foot 4th MT head OM: s/p (1/22) excisional debridement, op cxs with E.coli, S.aureus, presumptive enterococcus species, GNR.  Was taking PO cipro/clindamycin.    Continue Linezolid and Zosyn for now.     Follow final Op cultures  Final ID plan TBD pending culture results.         Known to ID for previous:  Right foot infection: s/p (10/9/24) I&D and right fifth toe metatarsal bone and phalangeal base excision-Op cx positive for E.coli, Pseudomonas (Fluoroquinolone-S), MRSA (Tetracycline & Bactrim-R), E.faecalis- s/p dalbavancin/cipro  left foot 5th MT head OM, previous cx from MT head resection (4/30/24) with MRSA  S/p (5/20/24) exc-debridement, 5th toe amputation and 5th MT shaft Superficial cx with MSSA and Enterococcus     DM2: A1C 8.5, contributing to above.    Recommend optimal blood glucose control to promote healing.     Hx bilatateral TKA       Anti-infectives:   Zosyn  Zyvox    Subjective:   Interval Events:  Afebrile.  Reports doing okay today/feeling better and was hopeful to be able to go home soon/today.        Patient is a 54 y.o. male who presented for planned I&D of left foot wound. He presented to ED for this wound on 1/18 and was discharged with Cipro and Clindamycin and was sent to Dr. Bravo for evaluation. He saw Radha Morejon on 1/21 and it was decided that he should be admitted for surgical intervention. He was taken to the OR on 1/22 where he underwent left foot debridement and left 4th MT bone excision, op cxs with GNRs. He is currently on Zosyn and Zyvox. ID has been consulted for assistance. Patient seen sitting up in bed with wife at bedside.    He is known to ID for bilateral DFI/OM from last year.     Patient Active Problem List   Diagnosis    HTN (hypertension)    Status post total knee replacement    HLD (hyperlipidemia)    Osteomyelitis of fifth toe of left foot     Amputation of fifth toe of left foot (HCC)    Wound dehiscence, surgical    Acute osteomyelitis of right foot    Type 2 diabetes mellitus (HCC)    Diabetic polyneuropathy associated with type 2 diabetes mellitus (HCC)    Callus of foot    Tobacco use disorder    Tobacco abuse counseling    Left foot pain    S/P foot surgery    Sepsis (HCC)    CAD (coronary artery disease)    Diabetic foot infection (HCC)     Past Medical History:   Diagnosis Date    Acid reflux     daily medication    Diabetes (HCC)     Mounjaro (last dose 9/22/24 ) and oral medication- avg fasting 140-150- no hypo sx- last A1C 8.1 9/25/24    H/O heart artery stent     3 stents in 2020    History of MI (myocardial infarction) 2020    History of MRSA infection     Hypertension     managed with medication    Smoker       History reviewed. No pertinent family history.   Social History     Tobacco Use    Smoking status: Every Day     Current packs/day: 1.50     Types: Cigarettes    Smokeless tobacco: Never   Substance Use Topics    Alcohol use: Not Currently     Past Surgical History:   Procedure Laterality Date    APPENDECTOMY      CHOLECYSTECTOMY      CORONARY STENT PLACEMENT      FOOT DEBRIDEMENT Left 5/20/2024    LEFT FOOT DEBRIDEMENT INCISION AND DRAINAGE, POSSIBLE LEFT FIFTH TOE AMPUTATION performed by Garrett Sanchez MD at [facility] MAIN OR    FOOT DEBRIDEMENT Left 1/22/2025    LEFT FOOT DEBRIDEMENT INCISION AND DRAINAGE performed by Juan Luis Bravo MD at [facility]    FOOT SURGERY Left 4/30/2024    LEFT FOOT 5TH METATARSAL  HEAD EXCISION performed by Lza Fierro MD at [facility]    FOOT SURGERY Right 10/9/2024    RIGHT 5th metatarsal head excision performed by Juan Luis Bravo MD at [facility]    NOSE SURGERY      TOTAL KNEE ARTHROPLASTY Bilateral       Prior to Admission medications    Medication Sig Start Date End Date Taking? Authorizing Provider   Semaglutide (RYBELSUS) 3 MG TABS Take 3 mg by mouth daily   Yes Provider, MD Vi

## 2025-01-24 NOTE — PROGRESS NOTES
[facility]   Progress Note    Patient: Nilay Whittaker MRN: [id number]  SSN: [id number]    YOB: 1970  Age: 54 y.o.  Sex: male      Admit Date: 1/22/2025    LOS: 2 days     Plan of Care:   1: WB status - Heel WB  2: DVT px -  per primary (rec at least asa 81bid)  3: continue to follow cultures  4: wound care consulted for wvac changes q2-3 days.  Will need wvac to dc home with.    Once home health [facility] and ID makes final abx recs, patient can dc home.   Plan to f/u with Dr. Bravo in 2-3 weeks for wound check.          Subjective:     Doing well  Cultures starting to result  Wound care nurse has come by    Objective:     Vitals:    01/23/25 1924 01/24/25 0726 01/24/25 1029 01/24/25 1145   BP: (!) 142/76 (!) 141/69     Pulse: 78 60     Resp: 16 17 16    Temp: 98.2 °F (36.8 °C) 97.5 °F (36.4 °C)     TempSrc: Oral Oral     SpO2: 93% 92%     Weight:    (!) 158.8 kg (350 lb)   Height:    2.032 m (6' 8\")        Intake and Output:  Current Shift: No intake/output data recorded.  Last three shifts: 01/22 1901 - 01/24 0700  In: -   Out: 2075 [Urine:2075]    Physical Exam:   Wvac with good seal. No signs of spreading infection      Lab/Data Review:  Lab Results   Component Value Date    WBC 16.1 (H) 01/22/2025    HGB 13.5 (L) 01/22/2025    HCT 41.2 01/22/2025    MCV 85.5 01/22/2025     01/22/2025     Lab Results   Component Value Date/Time     01/22/2025 08:45 PM    K 3.9 01/22/2025 08:45 PM     01/22/2025 08:45 PM    CO2 22 01/22/2025 08:45 PM    BUN 17 01/22/2025 08:45 PM    CREATININE 0.95 01/22/2025 08:45 PM    GLUCOSE 139 01/22/2025 08:45 PM    CALCIUM 8.8 01/22/2025 08:45 PM      Results       Procedure Component Value Units Date/Time    Culture, Blood 2 [[id number]] Collected: 01/22/25 2131    Order Status: Completed Specimen: Blood Updated: 01/24/25 0813     Special Requests --        LEFT  Antecubital       Culture NO GROWTH 2 DAYS       Culture,

## 2025-01-24 NOTE — WOUND CARE
Wound vac dressing removed, noted blister extending from wound up to 3rd toe and webbed space between 3 and 4th toe. Call to  to notify of blister, order to unroof blister.  Permission from patient to remove the blister skin. Once blister removed, no ulcer wound found, full thickness skin loss from blister.       Wound vac applied seal  difficult, exuderm applied to webbed space between 3rd and 4th toe to improve seal seal achieved and machine working well.

## 2025-01-25 VITALS
RESPIRATION RATE: 16 BRPM | HEIGHT: 78 IN | WEIGHT: 315 LBS | HEART RATE: 67 BPM | BODY MASS INDEX: 36.45 KG/M2 | OXYGEN SATURATION: 96 % | TEMPERATURE: 98 F | SYSTOLIC BLOOD PRESSURE: 130 MMHG | DIASTOLIC BLOOD PRESSURE: 78 MMHG

## 2025-01-25 DIAGNOSIS — G89.18 POST-OP PAIN: Primary | ICD-10-CM

## 2025-01-25 PROBLEM — A41.9 SEPSIS (HCC): Status: RESOLVED | Noted: 2025-01-22 | Resolved: 2025-01-25

## 2025-01-25 LAB
ANION GAP SERPL CALC-SCNC: 10 MMOL/L (ref 7–16)
BACTERIA SPEC CULT: ABNORMAL
BACTERIA SPEC CULT: ABNORMAL
BUN SERPL-MCNC: 14 MG/DL (ref 6–23)
CALCIUM SERPL-MCNC: 8.8 MG/DL (ref 8.8–10.2)
CHLORIDE SERPL-SCNC: 104 MMOL/L (ref 98–107)
CK SERPL-CCNC: 63 U/L (ref 21–215)
CO2 SERPL-SCNC: 27 MMOL/L (ref 20–29)
CREAT SERPL-MCNC: 0.86 MG/DL (ref 0.8–1.3)
ERYTHROCYTE [DISTWIDTH] IN BLOOD BY AUTOMATED COUNT: 14 % (ref 11.9–14.6)
FUNGAL CULT/SMEAR: NORMAL
FUNGAL CULT/SMEAR: NORMAL
FUNGUS SMEAR: NORMAL
FUNGUS SMEAR: NORMAL
GLUCOSE BLD STRIP.AUTO-MCNC: 160 MG/DL (ref 65–100)
GLUCOSE BLD STRIP.AUTO-MCNC: 208 MG/DL (ref 65–100)
GLUCOSE BLD STRIP.AUTO-MCNC: 256 MG/DL (ref 65–100)
GLUCOSE SERPL-MCNC: 173 MG/DL (ref 70–99)
GRAM STN SPEC: ABNORMAL
HCT VFR BLD AUTO: 41.8 % (ref 41.1–50.3)
HGB BLD-MCNC: 13.6 G/DL (ref 13.6–17.2)
MCH RBC QN AUTO: 28 PG (ref 26.1–32.9)
MCHC RBC AUTO-ENTMCNC: 32.5 G/DL (ref 31.4–35)
MCV RBC AUTO: 86.2 FL (ref 82–102)
NRBC # BLD: 0 K/UL (ref 0–0.2)
PLATELET # BLD AUTO: 287 K/UL (ref 150–450)
PMV BLD AUTO: 9.5 FL (ref 9.4–12.3)
POTASSIUM SERPL-SCNC: 4.5 MMOL/L (ref 3.5–5.1)
RBC # BLD AUTO: 4.85 M/UL (ref 4.23–5.6)
SERVICE CMNT-IMP: ABNORMAL
SODIUM SERPL-SCNC: 141 MMOL/L (ref 136–145)
SPECIMEN PROCESSING: NORMAL
SPECIMEN PROCESSING: NORMAL
SPECIMEN SOURCE: NORMAL
WBC # BLD AUTO: 8.4 K/UL (ref 4.3–11.1)

## 2025-01-25 PROCEDURE — 02HV33Z INSERTION OF INFUSION DEVICE INTO SUPERIOR VENA CAVA, PERCUTANEOUS APPROACH: ICD-10-PCS | Performed by: INTERNAL MEDICINE

## 2025-01-25 PROCEDURE — 82962 GLUCOSE BLOOD TEST: CPT

## 2025-01-25 PROCEDURE — 6360000002 HC RX W HCPCS: Performed by: INTERNAL MEDICINE

## 2025-01-25 PROCEDURE — 2580000003 HC RX 258: Performed by: INTERNAL MEDICINE

## 2025-01-25 PROCEDURE — C1751 CATH, INF, PER/CENT/MIDLINE: HCPCS

## 2025-01-25 PROCEDURE — 36415 COLL VENOUS BLD VENIPUNCTURE: CPT

## 2025-01-25 PROCEDURE — 2500000003 HC RX 250 WO HCPCS: Performed by: INTERNAL MEDICINE

## 2025-01-25 PROCEDURE — 85027 COMPLETE CBC AUTOMATED: CPT

## 2025-01-25 PROCEDURE — 36569 INSJ PICC 5 YR+ W/O IMAGING: CPT

## 2025-01-25 PROCEDURE — 80048 BASIC METABOLIC PNL TOTAL CA: CPT

## 2025-01-25 PROCEDURE — 82550 ASSAY OF CK (CPK): CPT

## 2025-01-25 PROCEDURE — 6370000000 HC RX 637 (ALT 250 FOR IP): Performed by: INTERNAL MEDICINE

## 2025-01-25 RX ORDER — SODIUM CHLORIDE 0.9 % (FLUSH) 0.9 %
5-40 SYRINGE (ML) INJECTION EVERY 12 HOURS SCHEDULED
Status: DISCONTINUED | OUTPATIENT
Start: 2025-01-25 | End: 2025-01-25 | Stop reason: HOSPADM

## 2025-01-25 RX ORDER — OXYCODONE HYDROCHLORIDE 5 MG/1
5 TABLET ORAL
Qty: 20 TABLET | Refills: 0 | Status: SHIPPED | OUTPATIENT
Start: 2025-01-25 | End: 2025-01-28

## 2025-01-25 RX ORDER — SODIUM CHLORIDE 9 MG/ML
INJECTION, SOLUTION INTRAVENOUS PRN
Status: DISCONTINUED | OUTPATIENT
Start: 2025-01-25 | End: 2025-01-25 | Stop reason: HOSPADM

## 2025-01-25 RX ORDER — SODIUM CHLORIDE 0.9 % (FLUSH) 0.9 %
5-40 SYRINGE (ML) INJECTION PRN
Status: DISCONTINUED | OUTPATIENT
Start: 2025-01-25 | End: 2025-01-25 | Stop reason: HOSPADM

## 2025-01-25 RX ADMIN — ENOXAPARIN SODIUM 40 MG: 100 INJECTION SUBCUTANEOUS at 08:40

## 2025-01-25 RX ADMIN — INSULIN LISPRO 2 UNITS: 100 INJECTION, SOLUTION INTRAVENOUS; SUBCUTANEOUS at 17:56

## 2025-01-25 RX ADMIN — INSULIN LISPRO 1 UNITS: 100 INJECTION, SOLUTION INTRAVENOUS; SUBCUTANEOUS at 11:47

## 2025-01-25 RX ADMIN — DAPTOMYCIN 950 MG: 500 INJECTION, POWDER, LYOPHILIZED, FOR SOLUTION INTRAVENOUS at 17:12

## 2025-01-25 RX ADMIN — ERTAPENEM SODIUM 1000 MG: 1 INJECTION INTRAMUSCULAR; INTRAVENOUS at 17:11

## 2025-01-25 RX ADMIN — LINEZOLID 600 MG: 600 INJECTION, SOLUTION INTRAVENOUS at 08:40

## 2025-01-25 RX ADMIN — SODIUM CHLORIDE, PRESERVATIVE FREE 10 ML: 5 INJECTION INTRAVENOUS at 08:41

## 2025-01-25 RX ADMIN — PIPERACILLIN AND TAZOBACTAM 3375 MG: 3; .375 INJECTION, POWDER, LYOPHILIZED, FOR SOLUTION INTRAVENOUS at 08:37

## 2025-01-25 RX ADMIN — PANTOPRAZOLE SODIUM 40 MG: 40 TABLET, DELAYED RELEASE ORAL at 06:28

## 2025-01-25 RX ADMIN — PIPERACILLIN AND TAZOBACTAM 3375 MG: 3; .375 INJECTION, POWDER, LYOPHILIZED, FOR SOLUTION INTRAVENOUS at 00:15

## 2025-01-25 NOTE — PROGRESS NOTES
Infectious Disease Progress Note    Today's Date: 1/25/2025   Admit Date: 1/22/2025 1970    ImpressionPlan:   Left foot 4th MT head OM: s/p (1/22) excisional debridement, op cxs with E.coli, S.aureus, presumptive enterococcus species, GNR.  Was taking PO cipro/clindamycin.    GNR was an ESBL, and it was R to quinolones, so we need to go with ertapenem.   Given the MRSA, enterococcus, and the DOT we have to use, I am changing from linezolid to daptomycin. He will need to hold crestor while on the daptomycin, so I placed this on hold.   We will plan on going through 2/24/25 to give him time to see Dr. Bravo and then ID in the outpatient setting.   ID asked the lab to do dapto sens on the MRSA  We discussed that we could change from ertapenem to meropenem to cover the previous pseudomonas as well, but that would be at least 3x a day dosing, and the patient did not want to do this especially since the pseudomonas was missing from the current cultures.  PICC line ordered.  C/s sent to   He can go from an ID standpoint when all the above is set up.  1) ertapenem 1 gram IV daily with EOT 2/24/25 AND  Daptomycin 8 mg/kg  IV every day with EOT 2/24/25  2) Routine PICC Care  3) Labs:  Every Monday: CRP, CBC with Differential, Creatinine, LFT, and CPK    Known to ID for previous:  Right foot infection: s/p (10/9/24) I&D and right fifth toe metatarsal bone and phalangeal base excision-Op cx positive for E.coli, Pseudomonas (Fluoroquinolone-S), MRSA (Tetracycline & Bactrim-R), E.faecalis- s/p dalbavancin/cipro  left foot 5th MT head OM, previous cx from MT head resection (4/30/24) with MRSA  S/p (5/20/24) exc-debridement, 5th toe amputation and 5th MT shaft Superficial cx with MSSA and Enterococcus     DM2: A1C 8.5, contributing to above.    Recommend optimal blood glucose control to promote healing.     Hx bilatateral TKA       Anti-infectives:   Zosyn  Zyvox    Subjective:   Interval Events:  Called by Dr. Carvajal as

## 2025-01-25 NOTE — CONSULTS
PICC Placement Note    PRE-PROCEDURE VERIFICATION    Correct Procedure: yes  Time out completed with assistant Mariia Fernandez RN and all persons present in agreement with time out.  Risks and benefits reviewed with patient and informed consent obtained prior to assessment and procedure.     Correct Site: yes  Temperature: Temp: 98 °F (36.7 °C), Temperature Source:    Recent Labs     01/25/25  0416   BUN 14      WBC 8.4     Allergies: Patient has no known allergies.  Education materials for PICC Care given to patient or family.    PROCEDURE DETAIL  A single lumen PICC line was started for antibiotic therapy. The following documentation is in addition to the PICC properties in the lines/airways flowsheet:    Lidocaine used: Yes  Mid-Arm Circumference: 38 (cm)  Internal Catheter Length: 45 (cm)  External Catheter Length: 0 (cm)  Total Catheter Length: 45 (cm)  Vein Selection for PICC: right brachial  Central Line Insertion Bundle followed: Yes  Complication Related to Insertion: none    Both the insertion guidewire and ECG guidewire were removed intact all ports have positive blood return and were flush well with normal saline.    The location of the tip of the PICC is verified using ECG technology.  The tip is in the SVC per ECG reading.  See image below.     Line is okay to use: yes          Annabel Marrero RN

## 2025-01-25 NOTE — PROGRESS NOTES
2025         Post Op day: 3 Days Post-Op     Admit Date: 2025  Admit Diagnosis: Left foot pain [M79.672]  S/P foot surgery [Z98.890]  Sepsis (HCC) [A41.9]  RIGHT 5th metatarsal head excision - Right  10/9/2024    Subjective: Doing well, No complaints, No SOB, No Chest Pain, No Nausea or Vomitting.       Weight Bearing Status: heel WB  BMP:  Recent Labs     25  0416   BUN 17 14    141   K 3.9 4.5    104   CO2 22 27     Patient Vitals for the past 8 hrs:   BP Temp Temp src Pulse Resp SpO2   25 0310 130/78 98 °F (36.7 °C) Oral 67 16 96 %     Temp (24hrs), Av.1 °F (36.7 °C), Min:98 °F (36.7 °C), Max:98.2 °F (36.8 °C)    CBC:  Recent Labs     25  0416   WBC 16.1* 8.4   HGB 13.5* 13.6   HCT 41.2 41.8    287       Microbiology:     Recent Results (from the past 72 hour(s))   POCT Glucose    Collection Time: 25 12:42 PM   Result Value Ref Range    POC Glucose 163 (H) 65 - 100 mg/dL    Performed by: Stanley    Culture, Anaerobic    Collection Time: 25  3:31 PM    Specimen: Foot; Swab    1   Result Value Ref Range    Special Requests NO SPECIAL REQUESTS      Culture        SUBCULTURE IS NECESSARY TO DETERMINE PRESENCE OR ABSENCE OF ANAEROBIC BACTERIA IN THIS CULTURE.  FURTHER REPORT TO FOLLOW AFTER INCUBATION OF SUBCULTURE.   Culture, Anaerobic    Collection Time: 25  3:31 PM    Specimen: Foot; Swab    2   Result Value Ref Range    Special Requests NO SPECIAL REQUESTS      Culture        SUBCULTURE IS NECESSARY TO DETERMINE PRESENCE OR ABSENCE OF ANAEROBIC BACTERIA IN THIS CULTURE.  FURTHER REPORT TO FOLLOW AFTER INCUBATION OF SUBCULTURE.   Culture, Fungus    Collection Time: 25  3:31 PM    Specimen: Foot; Swab   Result Value Ref Range    Sample Site LEFT FOOT      Specimen Processing Direct Inoculation     Fungal Cult/Smear Other source received    Culture, Fungus    Collection Time: 25  3:31 PM     Time: 01/24/25  6:03 AM   Result Value Ref Range    POC Glucose 170 (H) 65 - 100 mg/dL    Performed by: Darrion    POCT Glucose    Collection Time: 01/24/25 11:12 AM   Result Value Ref Range    POC Glucose 175 (H) 65 - 100 mg/dL    Performed by: Douglas    POCT Glucose    Collection Time: 01/24/25  3:29 PM   Result Value Ref Range    POC Glucose 226 (H) 65 - 100 mg/dL    Performed by: Troy    POCT Glucose    Collection Time: 01/24/25  8:52 PM   Result Value Ref Range    POC Glucose 197 (H) 65 - 100 mg/dL    Performed by: Darrion    CBC    Collection Time: 01/25/25  4:16 AM   Result Value Ref Range    WBC 8.4 4.3 - 11.1 K/uL    RBC 4.85 4.23 - 5.6 M/uL    Hemoglobin 13.6 13.6 - 17.2 g/dL    Hematocrit 41.8 41.1 - 50.3 %    MCV 86.2 82 - 102 FL    MCH 28.0 26.1 - 32.9 PG    MCHC 32.5 31.4 - 35.0 g/dL    RDW 14.0 11.9 - 14.6 %    Platelets 287 150 - 450 K/uL    MPV 9.5 9.4 - 12.3 FL    nRBC 0.00 0.0 - 0.2 K/uL   Basic Metabolic Panel w/ Reflex to MG    Collection Time: 01/25/25  4:16 AM   Result Value Ref Range    Sodium 141 136 - 145 mmol/L    Potassium 4.5 3.5 - 5.1 mmol/L    Chloride 104 98 - 107 mmol/L    CO2 27 20 - 29 mmol/L    Anion Gap 10 7 - 16 mmol/L    Glucose 173 (H) 70 - 99 mg/dL    BUN 14 6 - 23 MG/DL    Creatinine 0.86 0.80 - 1.30 MG/DL    Est, Glom Filt Rate >90 >60 ml/min/1.73m2    Calcium 8.8 8.8 - 10.2 MG/DL   POCT Glucose    Collection Time: 01/25/25  6:01 AM   Result Value Ref Range    POC Glucose 160 (H) 65 - 100 mg/dL    Performed by: Darrion        Objective: Vital Signs are Stable, No Acute Distress, Alert and Oriented  Dressing is Dry,  Neurovascular exam is normal.    Assessment:  Patient Active Problem List   Diagnosis    HTN (hypertension)    Status post total knee replacement    HLD (hyperlipidemia)    Osteomyelitis of fifth toe of left foot    Amputation of fifth toe of left foot (HCC)    Wound dehiscence, surgical    Acute osteomyelitis of right foot

## 2025-01-25 NOTE — CARE COORDINATION
Pt is for discharge home today with family.  Referral has been called/sent to [facility]  for follow up home care as ordered.  [facility] wound vac.  No additional CM orders received or supportive care needs expressed at this time.     01/25/25 2695   Service Assessment   Patient's Healthcare Decision Maker is: Legal Next of Kin   Social/Functional History   Lives With Spouse   Type of Home House   Prior Level of Assist for ADLs Independent   Prior Level of Assist for Homemaking Independent   Homemaking Responsibilities Yes   Ambulation Assistance Independent   Prior Level of Assist for Transfers Independent   Active  Yes   Occupation Full time employment   Type of Occupation    Services At/After Discharge   Transition of Care Consult (CM Consult) Home Health;DME/Supply Assistance   Internal Home Health No   Reason Outside Agency Chosen Script used patient chose alternate agency  ([facility] )   Services At/After Discharge Home Health;DME;Nursing services;IV Therapy  ([facility] , [facility] wound vac and [facility] for home IV abx)    Resource Information Provided? No   Mode of Transport at Discharge Other (see comment)  (family)   Confirm Follow Up Transport Family   Condition of Participation: Discharge Planning   The Plan for Transition of Care is related to the following treatment goals: Home health SN for wound vac dressing changes and home infusion.   The Patient and/or Patient Representative was provided with a Choice of Provider? Patient   The Patient and/Or Patient Representative agree with the Discharge Plan? Yes   Freedom of Choice list was provided with basic dialogue that supports the patient's individualized plan of care/goals, treatment preferences, and shares the quality data associated with the providers?  Yes

## 2025-01-25 NOTE — DISCHARGE SUMMARY
Patient discharged with wife and all belongings in hand. AVS reviewed and patient had no further questions or concerns. Patient went home with PICC for antibiotics and his own wound vac.

## 2025-01-25 NOTE — DISCHARGE SUMMARY
Hospitalist Discharge Summary   Admit Date:  2025 12:25 PM   DC Note date: 2025  Name:  Nilay Whittaker   Age:  54 y.o.  Sex:  male  :  1970   MRN:  [id number]   Room:  [facility]  PCP:  Aly Doshi APRN - NP    Presenting Complaint: No chief complaint on file.     Initial Admission Diagnosis: Left foot pain [M79.672]  S/P foot surgery [Z98.890]  Sepsis (HCC) [A41.9]     Problem List for this Hospitalization (present on admission):    Principal Problem:    Diabetic foot infection (HCC)  Active Problems:    Left foot pain    S/P foot surgery    HTN (hypertension)    Type 2 diabetes mellitus (HCC)    Tobacco use disorder    CAD (coronary artery disease)  Resolved Problems:    Sepsis (HCC)      Hospital Course:  54 y.o. male with DM2, smoking, HTN, and chronic left foot wound due to diabetes admitted on  s/p left foot abscess washout with fourth metatarsal excision and wound VAC placement.  He was started on Zosyn and Zyvox and infectious disease was consulted.  Wound VAC was monitored and adjusted by wound care nurses.  Cultures grew ESBL E. coli, MRSA, and Enterococcus so infectious disease switched him to ertapenem and daptomycin daily-EOT .  The patient remained stable and was discharged home with home health care for further IV antibiotics and wound care.    Infectious Disease Plan:   Left foot 4th MT head OM: s/p () excisional debridement, op cxs with E.coli, S.aureus, presumptive enterococcus species, GNR.  Was taking PO cipro/clindamycin.    GNR was an ESBL, and it was R to quinolones, so we need to go with ertapenem.   Given the MRSA, enterococcus, and the DOT we have to use, I am changing from linezolid to daptomycin. He will need to hold crestor while on the daptomycin, so I placed this on hold.   We will plan on going through 25 to give him time to see Dr. Bravo and then ID in the outpatient setting.   ID asked the lab to do dapto sens on the MRSA  We        Some of the medications may be marked as \"stop taking\" by the system; but in reality patient or family reported already being off these meds; defer to outpatient/prescribing providers.      Procedures done this admission:  Procedure(s) with comments:  LEFT FOOT DEBRIDEMENT INCISION AND DRAINAGE - POP SAPH BLOCK WITH MAC    Consults this admission:  IP CONSULT TO HOSPITALIST  IP CONSULT TO INFECTIOUS DISEASES  IP CONSULT HOME HEALTH  IP CONSULT TO VASCULAR ACCESS TEAM  IP CONSULT TO CASE MANAGEMENT  IP CONSULT TO PHARMACY  IP CONSULT TO VASCULAR ACCESS TEAM    Consultants will arrange their own follow ups, if applicable.    Echocardiogram results:  No results found for this or any previous visit.      Diagnostic Imaging/Tests:   XR FOOT LEFT (MIN 3 VIEWS)    Result Date: 1/18/2025  1.  No acute osseous abnormality. 2.  Chronic transmetatarsal amputation of the fifth ray. Electronically signed by Isabella Gomez       Labs: Results:       BMP, Mg, Phos Recent Labs     01/22/25 2045 01/25/25  0416    141   K 3.9 4.5    104   CO2 22 27   ANIONGAP 14 10   BUN 17 14   CREATININE 0.95 0.86   LABGLOM >90 >90   CALCIUM 8.8 8.8   GLUCOSE 139* 173*      CBC Recent Labs     01/22/25 2045 01/25/25  0416   WBC 16.1* 8.4   RBC 4.82 4.85   HGB 13.5* 13.6   HCT 41.2 41.8   MCV 85.5 86.2   MCH 28.0 28.0   MCHC 32.8 32.5   RDW 14.4 14.0    287   MPV 9.9 9.5   NRBC 0.00 0.00   LYMPHOPCT 10.8*  --    MONOPCT 9.8  --    BASOPCT 0.4  --    IMMGRAN 0.7  --    LYMPHSABS 1.74  --    EOSABS 0.03  --    MONOSABS 1.58*  --    BASOSABS 0.07  --    ABSIMMGRAN 0.12  --       LFT Recent Labs     01/22/25 2045   BILITOT 0.5   ALKPHOS 81   AST 12*   ALT 10   PROTEIN 6.8   ALBUMIN 2.8*   GLOB 4.0*      Cardiac  No results found for: \"NTPROBNP\", \"TROPHS\"   Coags No results found for: \"PROTIME\", \"INR\", \"APTT\"   A1c Lab Results   Component Value Date/Time    LABA1C 8.5 01/23/2025 05:29 AM    LABA1C 8.8 05/17/2024 07:06 PM

## 2025-01-25 NOTE — PROGRESS NOTES
Hospitalist Progress Note   Admit Date:  2025 12:25 PM   Name:  Nilay Whittaker   Age:  54 y.o.  Sex:  male  :  1970   MRN:  [id number]   Room:  [facility]/    Presenting/Chief Complaint: No chief complaint on file.     Reason(s) for Admission: Left foot pain [M79.672]  S/P foot surgery [Z98.890]  Sepsis (HCC) [A41.9]     Hospital Course:   Nilay Whittaker is a 54 y.o. male with DM2, smoking, HTN, and chronic left foot wound due to diabetes admitted on  s/p left foot abscess washout with fourth metatarsal excision and wound VAC placement.    Subjective & 24hr Events:   Asking to go home.  Left foot pain 4/10 currently.  Denies fevers.  Denies shortness of breath.  Denies N/V    Assessment & Plan:     Principal Problem:    Diabetic foot infection (HCC)    Left foot pain    S/P foot surgery  S/p I&D with fourth metatarsal incision on   Per primary team  Continue Zyvox 600 mg p.o. every  Continue Zosyn 3.375 mg every 8 hours  Awaiting final infectious disease recommendations since wound culture appears polymicrobial  Wound VAC in place  Wound care per wound nurse    Active Problems:    Sepsis (HCC)   WBC 16.1 + HR 93 + left foot infection  S/p I&D with fourth metatarsal incision on  resolved  Continue Zyvox 600 mg p.o. every  Continue Zosyn 3.375 mg every 8 hours  Infectious disease following  Wound care per wound nurse      HTN (hypertension)  Blood pressure currently stable      Type 2 diabetes mellitus (HCC)  Blood sugars slightly elevated today  Start Lantus 5 units nightly this evening  Continue Humalog SSI      Tobacco use disorder  And nicotine patch daily      CAD (coronary artery disease)  No acute chest pain  Continue Crestor 40 mg nightly      Anticipated Discharge Arrangements:   Home Health    PT/OT evals ordered?  Therapy evals ordered  Diet:  ADULT DIET; Regular; 3 carb choices (45 gm/meal)  VTE prophylaxis: Defer to primary team  Code status: Full

## 2025-01-26 LAB
BACTERIA SPEC CULT: ABNORMAL
BACTERIA SPEC CULT: NORMAL
BACTERIA SPEC CULT: NORMAL
GRAM STN SPEC: ABNORMAL
SERVICE CMNT-IMP: ABNORMAL
SERVICE CMNT-IMP: NORMAL
SERVICE CMNT-IMP: NORMAL

## 2025-01-27 ENCOUNTER — FOLLOWUP TELEPHONE ENCOUNTER (OUTPATIENT)
Dept: CASE MANAGEMENT | Age: 55
End: 2025-01-27

## 2025-01-27 LAB
BACTERIA SPEC CULT: NORMAL
BACTERIA SPEC CULT: NORMAL
FUNGUS SMEAR: NORMAL
FUNGUS SMEAR: NORMAL
SERVICE CMNT-IMP: NORMAL
SERVICE CMNT-IMP: NORMAL
SPECIMEN SOURCE: NORMAL
SPECIMEN SOURCE: NORMAL

## 2025-01-27 NOTE — PROGRESS NOTES
RN navigator contacted patient for the first time following hospital discharge on 1/25/25. Appointment for PCP made 2/5 at 1pm; Dr Bravo (ortho) 2/13 @ 8:20am.   Patient stated he has not heard from [facility] as of phone call. Medication from [facility] delivered to pt home on 1/25/25. Patient spouse has been administering IV ABT recently.   Patient expressed no needs at this time. RN to continue to follow.

## 2025-01-28 LAB
BACTERIA SPEC CULT: NORMAL
SERVICE CMNT-IMP: NORMAL

## 2025-01-29 ENCOUNTER — TELEPHONE (OUTPATIENT)
Dept: ORTHOPEDIC SURGERY | Age: 55
End: 2025-01-29

## 2025-02-04 ENCOUNTER — CLINICAL DOCUMENTATION (OUTPATIENT)
Dept: ORTHOPEDIC SURGERY | Age: 55
End: 2025-02-04

## 2025-02-10 ENCOUNTER — TELEPHONE (OUTPATIENT)
Dept: ORTHOPEDIC SURGERY | Age: 55
End: 2025-02-10

## 2025-02-10 NOTE — TELEPHONE ENCOUNTER
She does his wound care 3 x week. He is c/o pain where they removed the pinky toe. He rates it a 2 and it is worse when he flexes it upward. She wanted to know if she needed to do anything different.     His middle toe on the same foot is red, swollen, warm to the touch. She doesn't see any opening and he does not complain of it hurting. He is still getting IV antibiotics.

## 2025-02-13 ENCOUNTER — OFFICE VISIT (OUTPATIENT)
Dept: ORTHOPEDIC SURGERY | Age: 55
End: 2025-02-13

## 2025-02-13 DIAGNOSIS — L03.032 CELLULITIS OF TOE OF LEFT FOOT: ICD-10-CM

## 2025-02-13 DIAGNOSIS — M79.671 PAIN IN RIGHT FOOT: Primary | ICD-10-CM

## 2025-02-13 DIAGNOSIS — M86.9 OSTEOMYELITIS OF FIFTH TOE OF LEFT FOOT: ICD-10-CM

## 2025-02-13 PROCEDURE — 99024 POSTOP FOLLOW-UP VISIT: CPT | Performed by: ORTHOPAEDIC SURGERY

## 2025-02-13 NOTE — PROGRESS NOTES
Name: Nilay Whittaker  YOB: 1970  Gender: male  MRN: [id number]    Plan:    Sitting work only until April 13, 2025.  At that point we will revisit his return to work status.  It may be extended at that point.  Continue wound VAC changes every 2 to 3 days by home health wound care  Continue antibiotics underneath the recommendation of infectious the specialist-ertapenem and daptomycin.  He will see them on 2/24/2025 and decided on future antibiotics.    From my standpoint there is no surgical options other than a transmetatarsal amputation.    Follow up in 2 month(s)with XR and for wound check         Procedure: Left Foot Debridement Incision And Drainage - Left    Surgery Date: 1/22/2025    1) ertapenem 1 gram IV daily with EOT 2/24/25 AND  Daptomycin 8 mg/kg  IV every day with EOT 2/24/25  2) Routine PICC Care  3) Labs:  Every Monday: CRP, CBC with Differential, Creatinine, LFT, and CPK      Subjective: Denies fevers chills or infection.  Denies any signs of spreading erythema.  He is seeing infectious disease.  He still has a wound VAC.  Wound VAC has a good seal.  His infectious disease appointment is 2/24/2025.  Currently he is taking the antibiotics that are prescribed by the infectious the specialist.  The wound is getting better and the wound care he was coming out to 3 times a week.    ROS: Patient Denies fever/chills, headache, visual changes, chest pain, shortness of breath, and nausea/vomiting/diarrhea     Exam:     Wounds: No retained sutures.  Wound VAC with good seal.  The wound VAC was removed.  The ulcer underneath the fourth MTP joint still tracked deep.  It measured about 2 cm in diameter circularly and tracked down to centimeters in the deep fascia.  No surrounding erythema.  The erythema and superficial skin layers is completely healed.    Vascular: BLE: 2+ DP pulse, toes wwp w/ BCR<2s    Imaging:   No imaging reviewed and Interpretation of imaging    Assessment and

## 2025-02-17 ENCOUNTER — TELEPHONE (OUTPATIENT)
Dept: ORTHOPEDIC SURGERY | Age: 55
End: 2025-02-17

## 2025-02-17 ENCOUNTER — CLINICAL DOCUMENTATION (OUTPATIENT)
Dept: ORTHOPEDIC SURGERY | Age: 55
End: 2025-02-17

## 2025-02-17 NOTE — TELEPHONE ENCOUNTER
Patient left msg checking on his form sent to [facility] 2-6-25.

## 2025-02-19 ENCOUNTER — TELEPHONE (OUTPATIENT)
Dept: ORTHOPEDIC SURGERY | Age: 55
End: 2025-02-19

## 2025-02-19 ENCOUNTER — HOSPITAL ENCOUNTER (INPATIENT)
Age: 55
LOS: 1 days | Discharge: HOME HEALTH | DRG: 638 | End: 2025-02-20
Attending: EMERGENCY MEDICINE | Admitting: FAMILY MEDICINE
Payer: COMMERCIAL

## 2025-02-19 ENCOUNTER — APPOINTMENT (OUTPATIENT)
Dept: GENERAL RADIOLOGY | Age: 55
DRG: 638 | End: 2025-02-19
Payer: COMMERCIAL

## 2025-02-19 DIAGNOSIS — M86.9 OSTEOMYELITIS OF FOURTH TOE OF LEFT FOOT (HCC): Primary | ICD-10-CM

## 2025-02-19 DIAGNOSIS — Z86.39 HISTORY OF TYPE 2 DIABETES MELLITUS: ICD-10-CM

## 2025-02-19 DIAGNOSIS — M86.9 OSTEOMYELITIS OF THIRD TOE OF LEFT FOOT (HCC): ICD-10-CM

## 2025-02-19 PROBLEM — M86.172 OSTEOMYELITIS OF FOOT, LEFT, ACUTE (HCC): Status: ACTIVE | Noted: 2025-02-19

## 2025-02-19 LAB
ALBUMIN SERPL-MCNC: 3.5 G/DL (ref 3.5–5)
ALBUMIN/GLOB SERPL: 0.8 (ref 1–1.9)
ALP SERPL-CCNC: 111 U/L (ref 40–129)
ALT SERPL-CCNC: 29 U/L (ref 8–55)
ANION GAP SERPL CALC-SCNC: 12 MMOL/L (ref 7–16)
AST SERPL-CCNC: 29 U/L (ref 15–37)
BASOPHILS # BLD: 0.1 K/UL (ref 0–0.2)
BASOPHILS NFR BLD: 0.9 % (ref 0–2)
BILIRUB SERPL-MCNC: <0.2 MG/DL (ref 0–1.2)
BUN SERPL-MCNC: 18 MG/DL (ref 6–23)
CALCIUM SERPL-MCNC: 9.4 MG/DL (ref 8.8–10.2)
CHLORIDE SERPL-SCNC: 102 MMOL/L (ref 98–107)
CO2 SERPL-SCNC: 24 MMOL/L (ref 20–29)
CREAT SERPL-MCNC: 0.92 MG/DL (ref 0.8–1.3)
DIFFERENTIAL METHOD BLD: ABNORMAL
EOSINOPHIL # BLD: 0.36 K/UL (ref 0–0.8)
EOSINOPHIL NFR BLD: 3.3 % (ref 0.5–7.8)
ERYTHROCYTE [DISTWIDTH] IN BLOOD BY AUTOMATED COUNT: 15.5 % (ref 11.9–14.6)
ERYTHROCYTE [SEDIMENTATION RATE] IN BLOOD: 18 MM/HR
GLOBULIN SER CALC-MCNC: 4.3 G/DL (ref 2.3–3.5)
GLUCOSE BLD STRIP.AUTO-MCNC: 158 MG/DL (ref 65–100)
GLUCOSE SERPL-MCNC: 184 MG/DL (ref 70–99)
HCT VFR BLD AUTO: 51.1 % (ref 41.1–50.3)
HGB BLD-MCNC: 17.1 G/DL (ref 13.6–17.2)
IMM GRANULOCYTES # BLD AUTO: 0.13 K/UL (ref 0–0.5)
IMM GRANULOCYTES NFR BLD AUTO: 1.2 % (ref 0–5)
LACTATE SERPL-SCNC: 1.7 MMOL/L (ref 0.5–2)
LYMPHOCYTES # BLD: 2.93 K/UL (ref 0.5–4.6)
LYMPHOCYTES NFR BLD: 26.6 % (ref 13–44)
MCH RBC QN AUTO: 28.2 PG (ref 26.1–32.9)
MCHC RBC AUTO-ENTMCNC: 33.5 G/DL (ref 31.4–35)
MCV RBC AUTO: 84.2 FL (ref 82–102)
MONOCYTES # BLD: 0.7 K/UL (ref 0.1–1.3)
MONOCYTES NFR BLD: 6.4 % (ref 4–12)
NEUTS SEG # BLD: 6.78 K/UL (ref 1.7–8.2)
NEUTS SEG NFR BLD: 61.6 % (ref 43–78)
NRBC # BLD: 0 K/UL (ref 0–0.2)
PLATELET # BLD AUTO: 228 K/UL (ref 150–450)
PMV BLD AUTO: 10.8 FL (ref 9.4–12.3)
POTASSIUM SERPL-SCNC: 4.8 MMOL/L (ref 3.5–5.1)
PROT SERPL-MCNC: 7.7 G/DL (ref 6.3–8.2)
RBC # BLD AUTO: 6.07 M/UL (ref 4.23–5.6)
SERVICE CMNT-IMP: ABNORMAL
SODIUM SERPL-SCNC: 138 MMOL/L (ref 136–145)
WBC # BLD AUTO: 11 K/UL (ref 4.3–11.1)

## 2025-02-19 PROCEDURE — 6360000002 HC RX W HCPCS

## 2025-02-19 PROCEDURE — 2580000003 HC RX 258: Performed by: FAMILY MEDICINE

## 2025-02-19 PROCEDURE — 6370000000 HC RX 637 (ALT 250 FOR IP): Performed by: FAMILY MEDICINE

## 2025-02-19 PROCEDURE — 2500000003 HC RX 250 WO HCPCS: Performed by: FAMILY MEDICINE

## 2025-02-19 PROCEDURE — 85652 RBC SED RATE AUTOMATED: CPT

## 2025-02-19 PROCEDURE — 85025 COMPLETE CBC W/AUTO DIFF WBC: CPT

## 2025-02-19 PROCEDURE — 80053 COMPREHEN METABOLIC PANEL: CPT

## 2025-02-19 PROCEDURE — 1100000000 HC RM PRIVATE

## 2025-02-19 PROCEDURE — 83605 ASSAY OF LACTIC ACID: CPT

## 2025-02-19 PROCEDURE — 82962 GLUCOSE BLOOD TEST: CPT

## 2025-02-19 PROCEDURE — 6360000002 HC RX W HCPCS: Performed by: FAMILY MEDICINE

## 2025-02-19 PROCEDURE — 73660 X-RAY EXAM OF TOE(S): CPT

## 2025-02-19 PROCEDURE — 2580000003 HC RX 258

## 2025-02-19 PROCEDURE — 99285 EMERGENCY DEPT VISIT HI MDM: CPT

## 2025-02-19 RX ORDER — PANTOPRAZOLE SODIUM 40 MG/1
40 TABLET, DELAYED RELEASE ORAL NIGHTLY
Status: DISCONTINUED | OUTPATIENT
Start: 2025-02-19 | End: 2025-02-20 | Stop reason: HOSPADM

## 2025-02-19 RX ORDER — CETIRIZINE HYDROCHLORIDE 10 MG/1
10 TABLET ORAL NIGHTLY
Status: DISCONTINUED | OUTPATIENT
Start: 2025-02-19 | End: 2025-02-20 | Stop reason: HOSPADM

## 2025-02-19 RX ORDER — POTASSIUM CHLORIDE 7.45 MG/ML
10 INJECTION INTRAVENOUS PRN
Status: DISCONTINUED | OUTPATIENT
Start: 2025-02-19 | End: 2025-02-20 | Stop reason: HOSPADM

## 2025-02-19 RX ORDER — DEXTROSE MONOHYDRATE 100 MG/ML
INJECTION, SOLUTION INTRAVENOUS CONTINUOUS PRN
Status: DISCONTINUED | OUTPATIENT
Start: 2025-02-19 | End: 2025-02-20 | Stop reason: HOSPADM

## 2025-02-19 RX ORDER — MONTELUKAST SODIUM 10 MG/1
10 TABLET ORAL NIGHTLY
Status: DISCONTINUED | OUTPATIENT
Start: 2025-02-19 | End: 2025-02-20 | Stop reason: HOSPADM

## 2025-02-19 RX ORDER — SODIUM CHLORIDE 9 MG/ML
INJECTION, SOLUTION INTRAVENOUS PRN
Status: DISCONTINUED | OUTPATIENT
Start: 2025-02-19 | End: 2025-02-20 | Stop reason: HOSPADM

## 2025-02-19 RX ORDER — IBUPROFEN 600 MG/1
1 TABLET ORAL PRN
Status: DISCONTINUED | OUTPATIENT
Start: 2025-02-19 | End: 2025-02-20 | Stop reason: HOSPADM

## 2025-02-19 RX ORDER — PREGABALIN 75 MG/1
300 CAPSULE ORAL 2 TIMES DAILY
Status: DISCONTINUED | OUTPATIENT
Start: 2025-02-19 | End: 2025-02-20 | Stop reason: HOSPADM

## 2025-02-19 RX ORDER — ACETAMINOPHEN 650 MG/1
650 SUPPOSITORY RECTAL EVERY 6 HOURS PRN
Status: DISCONTINUED | OUTPATIENT
Start: 2025-02-19 | End: 2025-02-20 | Stop reason: HOSPADM

## 2025-02-19 RX ORDER — FAMOTIDINE 20 MG/1
10 TABLET, FILM COATED ORAL DAILY PRN
Status: DISCONTINUED | OUTPATIENT
Start: 2025-02-19 | End: 2025-02-20 | Stop reason: HOSPADM

## 2025-02-19 RX ORDER — SODIUM CHLORIDE 0.9 % (FLUSH) 0.9 %
5-40 SYRINGE (ML) INJECTION EVERY 12 HOURS SCHEDULED
Status: DISCONTINUED | OUTPATIENT
Start: 2025-02-19 | End: 2025-02-20 | Stop reason: HOSPADM

## 2025-02-19 RX ORDER — AMLODIPINE BESYLATE 10 MG/1
10 TABLET ORAL
Status: DISCONTINUED | OUTPATIENT
Start: 2025-02-19 | End: 2025-02-20 | Stop reason: HOSPADM

## 2025-02-19 RX ORDER — SODIUM CHLORIDE 0.9 % (FLUSH) 0.9 %
5-40 SYRINGE (ML) INJECTION PRN
Status: DISCONTINUED | OUTPATIENT
Start: 2025-02-19 | End: 2025-02-20 | Stop reason: HOSPADM

## 2025-02-19 RX ORDER — METOPROLOL SUCCINATE 50 MG/1
50 TABLET, EXTENDED RELEASE ORAL
Status: DISCONTINUED | OUTPATIENT
Start: 2025-02-19 | End: 2025-02-20 | Stop reason: HOSPADM

## 2025-02-19 RX ORDER — BISACODYL 10 MG
10 SUPPOSITORY, RECTAL RECTAL DAILY PRN
Status: DISCONTINUED | OUTPATIENT
Start: 2025-02-19 | End: 2025-02-20 | Stop reason: HOSPADM

## 2025-02-19 RX ORDER — INSULIN LISPRO 100 [IU]/ML
0-8 INJECTION, SOLUTION INTRAVENOUS; SUBCUTANEOUS
Status: DISCONTINUED | OUTPATIENT
Start: 2025-02-19 | End: 2025-02-20 | Stop reason: HOSPADM

## 2025-02-19 RX ORDER — METRONIDAZOLE 500 MG/100ML
500 INJECTION, SOLUTION INTRAVENOUS EVERY 8 HOURS
Status: DISCONTINUED | OUTPATIENT
Start: 2025-02-19 | End: 2025-02-20

## 2025-02-19 RX ORDER — NICOTINE 21 MG/24HR
1 PATCH, TRANSDERMAL 24 HOURS TRANSDERMAL DAILY
Status: DISCONTINUED | OUTPATIENT
Start: 2025-02-19 | End: 2025-02-20 | Stop reason: HOSPADM

## 2025-02-19 RX ORDER — MAGNESIUM HYDROXIDE/ALUMINUM HYDROXICE/SIMETHICONE 120; 1200; 1200 MG/30ML; MG/30ML; MG/30ML
30 SUSPENSION ORAL EVERY 6 HOURS PRN
Status: DISCONTINUED | OUTPATIENT
Start: 2025-02-19 | End: 2025-02-20 | Stop reason: HOSPADM

## 2025-02-19 RX ORDER — LISINOPRIL 20 MG/1
40 TABLET ORAL
Status: DISCONTINUED | OUTPATIENT
Start: 2025-02-19 | End: 2025-02-20 | Stop reason: HOSPADM

## 2025-02-19 RX ORDER — MAGNESIUM SULFATE IN WATER 40 MG/ML
2000 INJECTION, SOLUTION INTRAVENOUS PRN
Status: DISCONTINUED | OUTPATIENT
Start: 2025-02-19 | End: 2025-02-20 | Stop reason: HOSPADM

## 2025-02-19 RX ORDER — ROSUVASTATIN CALCIUM 20 MG/1
40 TABLET, COATED ORAL EVERY EVENING
Status: DISCONTINUED | OUTPATIENT
Start: 2025-02-19 | End: 2025-02-20 | Stop reason: HOSPADM

## 2025-02-19 RX ORDER — ACETAMINOPHEN 325 MG/1
650 TABLET ORAL EVERY 6 HOURS PRN
Status: DISCONTINUED | OUTPATIENT
Start: 2025-02-19 | End: 2025-02-20 | Stop reason: HOSPADM

## 2025-02-19 RX ORDER — POTASSIUM CHLORIDE 1500 MG/1
40 TABLET, EXTENDED RELEASE ORAL PRN
Status: DISCONTINUED | OUTPATIENT
Start: 2025-02-19 | End: 2025-02-20 | Stop reason: HOSPADM

## 2025-02-19 RX ORDER — POLYETHYLENE GLYCOL 3350 17 G/17G
17 POWDER, FOR SOLUTION ORAL DAILY PRN
Status: DISCONTINUED | OUTPATIENT
Start: 2025-02-19 | End: 2025-02-20 | Stop reason: HOSPADM

## 2025-02-19 RX ADMIN — DAPTOMYCIN 950 MG: 500 INJECTION, POWDER, LYOPHILIZED, FOR SOLUTION INTRAVENOUS at 20:57

## 2025-02-19 RX ADMIN — SODIUM CHLORIDE, PRESERVATIVE FREE 10 ML: 5 INJECTION INTRAVENOUS at 20:57

## 2025-02-19 RX ADMIN — LISINOPRIL 40 MG: 20 TABLET ORAL at 22:40

## 2025-02-19 RX ADMIN — AMLODIPINE BESYLATE 10 MG: 10 TABLET ORAL at 21:14

## 2025-02-19 RX ADMIN — CETIRIZINE HYDROCHLORIDE 10 MG: 10 TABLET, FILM COATED ORAL at 21:15

## 2025-02-19 RX ADMIN — CEFTRIAXONE SODIUM 2000 MG: 2 INJECTION, POWDER, FOR SOLUTION INTRAMUSCULAR; INTRAVENOUS at 21:07

## 2025-02-19 RX ADMIN — METOPROLOL SUCCINATE 50 MG: 50 TABLET, EXTENDED RELEASE ORAL at 21:14

## 2025-02-19 RX ADMIN — PANTOPRAZOLE SODIUM 40 MG: 40 TABLET, DELAYED RELEASE ORAL at 21:14

## 2025-02-19 RX ADMIN — METRONIDAZOLE 500 MG: 500 INJECTION, SOLUTION INTRAVENOUS at 22:43

## 2025-02-19 RX ADMIN — MONTELUKAST 10 MG: 10 TABLET, FILM COATED ORAL at 21:15

## 2025-02-19 RX ADMIN — SODIUM CHLORIDE: 9 INJECTION, SOLUTION INTRAVENOUS at 21:05

## 2025-02-19 ASSESSMENT — ENCOUNTER SYMPTOMS
ALLERGIC/IMMUNOLOGIC NEGATIVE: 1
GASTROINTESTINAL NEGATIVE: 1
COLOR CHANGE: 1
RESPIRATORY NEGATIVE: 1
EYES NEGATIVE: 1

## 2025-02-19 ASSESSMENT — PAIN - FUNCTIONAL ASSESSMENT: PAIN_FUNCTIONAL_ASSESSMENT: NONE - DENIES PAIN

## 2025-02-19 NOTE — TELEPHONE ENCOUNTER
He was having some redness on his left middle toe about a month ago. It is still warm to touch and per the patient it was purple last night. She is asking for a call to discuss.

## 2025-02-19 NOTE — ED PROVIDER NOTES
Emergency Department Provider Note       PCP: Aly Doshi, APRN - NP   Age: 54 y.o.   Sex: male     DISPOSITION Decision To Admit 02/19/2025 07:05:19 PM    ICD-10-CM    1. Osteomyelitis of fourth toe of left foot (HCC)  M86.9       2. Osteomyelitis of third toe of left foot (HCC)  M86.9       3. History of type 2 diabetes mellitus  Z86.39           Medical Decision Making     In summary, this is a generally well-appearing 54-year-old male with history of osteomyelitis to fifth toe requiring amputation, chronic wound to left foot, type 2 diabetes, diabetic nephropathy, tobacco use, CAD, hypertension coming into the emergency department at the recommendation of home health for concerns for new infection.  Patient and home health report erythema to the superior aspect of the left third toe.  Patient is not had any fevers.  Patient on outpatient ertapenem and daptomycin at the recommendation of infectious disease from most recent admission.  Patient with PICC line in place.  On exam, there is mild erythema to the above area.  Workup was obtained which was grossly unremarkable from a lab work standpoint with no leukocytosis or associated left shift.  Lactic normal.  Glucose elevated at 184 without anion gap acidosis.  X-ray was obtained which showed new soft tissue gas projecting about the proximal aspects of the proximal phalanx of the fourth digit extending to the base of the proximal phalanx of the third digit questionable for osteomyelitis.  Given history and findings here today, patient would benefit from inpatient admission, MRI, repeat ID consultation.  Daptomycin ordered.  Cultures ordered.  Findings and plan were discussed at length with patient in which he is in agreement with.  I have reached out to hospitalist, Dr. Roberto, who is in agreement for admission of this patient.    This patient was seen in consultation with ER attending, Dr. Akers, who is in agreement with the above plan.  ED Course as of

## 2025-02-19 NOTE — ED TRIAGE NOTES
Pt ambulatory to triage for wound check on left foot toe. pt had surgery on one of his toes on left foot about a month ago with Dr Bravo. Pt's home health nurse with concerns of infection. Pt was advised to be seen in ED

## 2025-02-19 NOTE — TELEPHONE ENCOUNTER
Patient sent pictures of his toe   Redness and swelling in foot , he states he is worried this is getting worse. He is on Antibiotics from ID.Denies chills or fever.   Advised to go to ED for treatment.

## 2025-02-20 VITALS
HEART RATE: 61 BPM | WEIGHT: 315 LBS | OXYGEN SATURATION: 92 % | SYSTOLIC BLOOD PRESSURE: 130 MMHG | BODY MASS INDEX: 36.45 KG/M2 | RESPIRATION RATE: 17 BRPM | TEMPERATURE: 97.3 F | HEIGHT: 78 IN | DIASTOLIC BLOOD PRESSURE: 73 MMHG

## 2025-02-20 LAB
ALBUMIN SERPL-MCNC: 3 G/DL (ref 3.5–5)
ALBUMIN/GLOB SERPL: 0.9 (ref 1–1.9)
ALP SERPL-CCNC: 95 U/L (ref 40–129)
ALT SERPL-CCNC: 24 U/L (ref 8–55)
ANION GAP SERPL CALC-SCNC: 16 MMOL/L (ref 7–16)
AST SERPL-CCNC: 25 U/L (ref 15–37)
BASOPHILS # BLD: 0.09 K/UL (ref 0–0.2)
BASOPHILS NFR BLD: 1.2 % (ref 0–2)
BILIRUB DIRECT SERPL-MCNC: <0.2 MG/DL (ref 0–0.4)
BILIRUB SERPL-MCNC: <0.2 MG/DL (ref 0–1.2)
BUN SERPL-MCNC: 19 MG/DL (ref 6–23)
CALCIUM SERPL-MCNC: 8.6 MG/DL (ref 8.8–10.2)
CHLORIDE SERPL-SCNC: 102 MMOL/L (ref 98–107)
CO2 SERPL-SCNC: 21 MMOL/L (ref 20–29)
CREAT SERPL-MCNC: 0.93 MG/DL (ref 0.8–1.3)
CRP SERPL-MCNC: 0.7 MG/DL (ref 0–0.4)
DIFFERENTIAL METHOD BLD: ABNORMAL
EOSINOPHIL # BLD: 0.34 K/UL (ref 0–0.8)
EOSINOPHIL NFR BLD: 4.6 % (ref 0.5–7.8)
ERYTHROCYTE [DISTWIDTH] IN BLOOD BY AUTOMATED COUNT: 15.1 % (ref 11.9–14.6)
GLOBULIN SER CALC-MCNC: 3.5 G/DL (ref 2.3–3.5)
GLUCOSE BLD STRIP.AUTO-MCNC: 161 MG/DL (ref 65–100)
GLUCOSE BLD STRIP.AUTO-MCNC: 162 MG/DL (ref 65–100)
GLUCOSE SERPL-MCNC: 167 MG/DL (ref 70–99)
HCT VFR BLD AUTO: 44.6 % (ref 41.1–50.3)
HGB BLD-MCNC: 15.4 G/DL (ref 13.6–17.2)
IMM GRANULOCYTES # BLD AUTO: 0.09 K/UL (ref 0–0.5)
IMM GRANULOCYTES NFR BLD AUTO: 1.2 % (ref 0–5)
LYMPHOCYTES # BLD: 3.03 K/UL (ref 0.5–4.6)
LYMPHOCYTES NFR BLD: 40.9 % (ref 13–44)
MCH RBC QN AUTO: 28.8 PG (ref 26.1–32.9)
MCHC RBC AUTO-ENTMCNC: 34.5 G/DL (ref 31.4–35)
MCV RBC AUTO: 83.4 FL (ref 82–102)
MONOCYTES # BLD: 0.64 K/UL (ref 0.1–1.3)
MONOCYTES NFR BLD: 8.7 % (ref 4–12)
NEUTS SEG # BLD: 3.21 K/UL (ref 1.7–8.2)
NEUTS SEG NFR BLD: 43.4 % (ref 43–78)
NRBC # BLD: 0 K/UL (ref 0–0.2)
PLATELET # BLD AUTO: 196 K/UL (ref 150–450)
PLATELET COMMENT: ADEQUATE
PMV BLD AUTO: 10.6 FL (ref 9.4–12.3)
POTASSIUM SERPL-SCNC: 4.1 MMOL/L (ref 3.5–5.1)
PREALB SERPL-MCNC: <3 MG/DL (ref 18–36)
PROT SERPL-MCNC: 6.5 G/DL (ref 6.3–8.2)
RBC # BLD AUTO: 5.35 M/UL (ref 4.23–5.6)
RBC MORPH BLD: ABNORMAL
SERVICE CMNT-IMP: ABNORMAL
SERVICE CMNT-IMP: ABNORMAL
SODIUM SERPL-SCNC: 139 MMOL/L (ref 136–145)
WBC # BLD AUTO: 7.4 K/UL (ref 4.3–11.1)
WBC MORPH BLD: ABNORMAL

## 2025-02-20 PROCEDURE — 6360000002 HC RX W HCPCS: Performed by: FAMILY MEDICINE

## 2025-02-20 PROCEDURE — 84134 ASSAY OF PREALBUMIN: CPT

## 2025-02-20 PROCEDURE — 86140 C-REACTIVE PROTEIN: CPT

## 2025-02-20 PROCEDURE — 87040 BLOOD CULTURE FOR BACTERIA: CPT

## 2025-02-20 PROCEDURE — 36415 COLL VENOUS BLD VENIPUNCTURE: CPT

## 2025-02-20 PROCEDURE — 85025 COMPLETE CBC W/AUTO DIFF WBC: CPT

## 2025-02-20 PROCEDURE — 80048 BASIC METABOLIC PNL TOTAL CA: CPT

## 2025-02-20 PROCEDURE — 6370000000 HC RX 637 (ALT 250 FOR IP): Performed by: FAMILY MEDICINE

## 2025-02-20 PROCEDURE — 2580000003 HC RX 258: Performed by: NURSE PRACTITIONER

## 2025-02-20 PROCEDURE — 6360000002 HC RX W HCPCS: Performed by: NURSE PRACTITIONER

## 2025-02-20 PROCEDURE — 2500000003 HC RX 250 WO HCPCS: Performed by: FAMILY MEDICINE

## 2025-02-20 PROCEDURE — 82962 GLUCOSE BLOOD TEST: CPT

## 2025-02-20 PROCEDURE — 80076 HEPATIC FUNCTION PANEL: CPT

## 2025-02-20 PROCEDURE — 2580000003 HC RX 258: Performed by: FAMILY MEDICINE

## 2025-02-20 RX ADMIN — SODIUM CHLORIDE, PRESERVATIVE FREE 10 ML: 5 INJECTION INTRAVENOUS at 09:03

## 2025-02-20 RX ADMIN — MEROPENEM 1000 MG: 1 INJECTION INTRAVENOUS at 12:03

## 2025-02-20 RX ADMIN — PREGABALIN 300 MG: 75 CAPSULE ORAL at 08:51

## 2025-02-20 RX ADMIN — METRONIDAZOLE 500 MG: 500 INJECTION, SOLUTION INTRAVENOUS at 05:16

## 2025-02-20 RX ADMIN — DAPTOMYCIN 950 MG: 500 INJECTION, POWDER, LYOPHILIZED, FOR SOLUTION INTRAVENOUS at 16:34

## 2025-02-20 NOTE — MANAGEMENT PLAN
Mr. Whittaker's note was accessed to help with completion of discharge orders.  Nancy, RN/charge nurse was beside me and Elizabeth CARBAJAL was on the phone while I did this.  Even though it states that we are stopping the Daptomycin and Merrem on the order set, this is actually to be continued until March 20, 2025 and a prescription has been sent in and will be provided by [facility].  Please see case management notes.  Only other correction was that he will also continue on his home omeprazole.  Prescription was not needed as this is a home medication.

## 2025-02-20 NOTE — CONSULTS
Orthopaedic Trauma Service  Consultation Note    Patient ID:  Nilay Whittaker  54 y.o.  male  1970   [id number]    Presenting/Chief Complaint: Concern for Left third toe infection    Date of Admission: 2/19/2025  5:06 PM   Reason(s) for Admission:   Osteomyelitis of foot, left, acute (HCC) [M86.172]  History of type 2 diabetes mellitus [Z86.39]  Osteomyelitis of third toe of left foot (HCC) [M86.9]  Osteomyelitis of fourth toe of left foot (HCC) [M86.9]     Date of Consultation: February 20, 2025  Referring Physician: Bob Hilario MD    Hospital Problems:  Principal Problem:    Osteomyelitis of foot, left, acute (HCC)  Active Problems:    HTN (hypertension)    HLD (hyperlipidemia)    Type 2 diabetes mellitus (HCC)    Diabetic polyneuropathy associated with type 2 diabetes mellitus (HCC)    Tobacco use disorder    CAD (coronary artery disease)  Resolved Problems:    * No resolved hospital problems. *    Assessment & Plan     Will order CRP, Prealbumin    On physical exam:  - there does appear to be slight swelling and erythema in the third toe. Patient likely has soft tissue infection of the third toe.  - plantar wound underlying fourth metatarsal head appears to be healing well.  Patient states this is slowly filling in.  No evidence of pus, purulence, odor.    Reviewed imaging w/ pt   -No evidence of osteomyelitis left toe/foot x-rays. Would not recommend further surgery of the toe to obtain biopsy  -Findings of gas on radiographs consistent with prior wound underlying fourth metatarsal    -Patient already on intravenous antibiotics per infectious disease covering polymicrobial infection    -Would not recommend further imaging with MRI as this would not  recommendations.    I had a long conversation with the patient explaining management of diabetic foot infections.    At this point, he has had appropriate surgical debridement of his diabetic foot ulcer on the plantar aspect of

## 2025-02-20 NOTE — PROGRESS NOTES
4 Eyes Skin Assessment     NAME:  Nilay Whittaker  YOB: 1970  MEDICAL RECORD NUMBER:  [id number]    The patient is being assessed for  Admission    I agree that at least one RN has performed a thorough Head to Toe Skin Assessment on the patient. ALL assessment sites listed below have been assessed.      Areas assessed by both nurses:    Head, Face, Ears, Shoulders, Back, Chest, Arms, Elbows, Hands, Sacrum. Buttock, Coccyx, Ischium, Legs. Feet and Heels, and Under Medical Devices         Does the Patient have a Wound? No noted wound(s)       Marquis Prevention initiated by RN: Yes  Wound Care Orders initiated by RN: No    Pressure Injury (Stage 3,4, Unstageable, DTI, NWPT, and Complex wounds) if present, place Wound referral order by RN under : No    New Ostomies, if present place, Ostomy referral order under : No     Nurse 1 eSignature: Electronically signed by Annabel Walker RN on 2/20/25 at 12:09 AM EST    **SHARE this note so that the co-signing nurse can place an eSignature**    Nurse 2 eSignature: Electronically signed by Kate Rodríguez RN on 2/20/25 at 7:31 AM EST

## 2025-02-20 NOTE — ACP (ADVANCE CARE PLANNING)
Advance Care Planning   Healthcare Decision Maker:    Primary Decision Maker: Avinash Whittaker - [facility] - [phone]    Click here to complete Healthcare Decision Makers including selection of the Healthcare Decision Maker Relationship (ie \"Primary\").

## 2025-02-20 NOTE — H&P
Hospitalist History and Physical   Admit Date:  2025  5:06 PM   Name:  Nilay Whittaker   Age:  54 y.o.  Sex:  male  :  1970   MRN:  [id number]   Room:  [facility]    Presenting/Chief Complaint: Wound Check     Reason(s) for Admission: Osteomyelitis of foot, left, acute (HCC) [M86.172]     History of Present Illness:   Nilay Whittaker is a 54 y.o. male who presented from home due to wound care nurse being concerned of possible infection to his 3rd left toe.     Left foot x ray today showed -  Prior postsurgical changes of amputation at the level of the fifth proximal metatarsal bone as well as resection of the fifth metatarsal head. There is soft tissue gas projecting about the proximal aspect of the proximal phalanx of the fourth digit extending to the base of the proximal  phalanx of the third digit questionable area of cortical irregularity involving  the proximal aspect of the proximal phalanx of the third digit. Soft tissue  swelling about the lateral aspect of the forefoot. The findings are concerning  for soft tissue infection as well as possible osteomyelitis.    He has had a recent discharge on  for left foot abscess s/p washout with 4th metatarsal excision and wound VAC placement. Wound cultures grew ESBL e coli, MRSA, enterococcus. ID placed him on ertapenem and daptomycin daily with EOT .     Hx of HTN, DM type II, HLD, toe amputation  Assessment & Plan:     Active Problems:    Left foot wound - Continue ertapenem (which is ceftriaxone and flagyl substitue in hospital), daptomycin. Consult ID, wound care, and ortho. NPO past midnight. He has no active pain. Blood cultures ordered.     DM type II - SS    HTN - BB, amlodipine, ACE    HLD - statin I will hold since on daptomycin    Neuropathy - lyrica    Nicotine patch      PT/OT evals ordered?  Not ordered; patient not expected to need rehab  Diet: ADULT DIET; Regular; 4 carb choices (60 gm/meal); Low Fat/Low Chol/High

## 2025-02-20 NOTE — PROGRESS NOTES
TRANSFER - IN REPORT:    Verbal report received from ROSHAN Tristan on Nilay Whittaker  being received from ED for routine progression of patient care      Report consisted of patient's Situation, Background, Assessment and   Recommendations(SBAR).     Information from the following report(s) Nurse Handoff Report and Adult Overview was reviewed with the receiving nurse.    Opportunity for questions and clarification was provided.      Assessment completed upon patient's arrival to unit and care assumed.

## 2025-02-20 NOTE — DISCHARGE SUMMARY
Hospitalist Discharge Summary   Admit Date:  2025  5:06 PM   DC Note date: 2025  Name:  Nilay Whittaker   Age:  54 y.o.  Sex:  male  :  1970   MRN:  [id number]   Room:    PCP:  Aly Doshi APRN - NP    Presenting Complaint: Wound Check     Initial Admission Diagnosis: Osteomyelitis of foot, left, acute (HCC) [M86.172]  History of type 2 diabetes mellitus [Z86.39]  Osteomyelitis of third toe of left foot (HCC) [M86.9]  Osteomyelitis of fourth toe of left foot (HCC) [M86.9]     Problem List for this Hospitalization (present on admission):    Principal Problem:    Osteomyelitis of foot, left, acute (HCC)  Active Problems:    HTN (hypertension)    HLD (hyperlipidemia)    Type 2 diabetes mellitus (HCC)    Diabetic polyneuropathy associated with type 2 diabetes mellitus (HCC)    Tobacco use disorder    CAD (coronary artery disease)  Resolved Problems:    * No resolved hospital problems. *      Hospital Course:    Nilay Whittaker is a 54 y.o. male with pmh DM 2, smoking, HTN, chronic left foot wound who presented to [facility] ED 25 from home due to wound care nurse being concerned for progression of infection to his 3rd left toe. He was d/db from our hospital on 25 with ertapenem and daptomycin for E Coli, MRSA, enterococcos wound on left foot.  He was compliant with abx regimen but reports wound looking progressively worse since he left hospital on .  Imaging done on this admission shows concern for osteomyelitis of 3rd and 4th digits.  Seen by ortho who offered surgery but declined at this time with goal to preserve foot as much as possible.  ID consulted with recommendations to change abx to Meropenem and Daptomycin with new eot 25.  Pt is already established with home health and does home infusions.  He has wound care supplies and can get more from his home health nurse.  His vitals are stable and he is ready for d/c.      Disposition: Home with Home

## 2025-02-20 NOTE — DISCHARGE INSTRUCTIONS
DISCHARGE SUMMARY from Nurse    PATIENT INSTRUCTIONS:    After general anesthesia or intravenous sedation, for 24 hours or while taking prescription Narcotics:  Limit your activities  Do not drive and operate hazardous machinery  Do not make important personal or business decisions  Do  not drink alcoholic beverages  If you have not urinated within 8 hours after discharge, please contact your surgeon on call.    Report the following to your surgeon:  Excessive pain, swelling, redness or odor of or around the surgical area  Temperature over 100.5  Nausea and vomiting lasting longer than 4 hours or if unable to take medications  Any signs of decreased circulation or nerve impairment to extremity: change in color, persistent  numbness, tingling, coldness or increase pain  Any questions    What to do at Home:  Recommended activity: per md instructions    If you experience any of the following symptoms fever, chest pain, shortness of breath, increased odor or drainage from wound, redness, swelling, drainage or streakin at picc line insertion site please follow up with PCP pr return to ER immediately.    *  Please give a list of your current medications to your Primary Care Provider.    *  Please update this list whenever your medications are discontinued, doses are      changed, or new medications (including over-the-counter products) are added.    *  Please carry medication information at all times in case of emergency situations.    These are general instructions for a healthy lifestyle:    No smoking/ No tobacco products/ Avoid exposure to second hand smoke  Surgeon General's Warning:  Quitting smoking now greatly reduces serious risk to your health.    Obesity, smoking, and sedentary lifestyle greatly increases your risk for illness    A healthy diet, regular physical exercise & weight monitoring are important for maintaining a healthy lifestyle    You may be retaining fluid if you have a history of heart failure

## 2025-02-20 NOTE — CONSULTS
Infectious Disease Consult    Today's Date: 2/20/2025   Admit Date: 2/19/2025 1970    Impression:   Left foot 4th MT head OM: s/p (1/22) excisional debridement, op cxs + ESBL E. Coli, enterococcus faecalis, and MRSA- was taking Cipro/Clinda PTA  Discharged on 1/27 to complete 6 weeks of Dapto/Ertapenem  Returned to ED with worsening left 3rd toe redness and wound tracking  No surgical intervention planned this admission, he follows up with Dr. Bravo on 3/13    Known to ID for previous:  Right foot infection: s/p (10/9/24) I&D and right fifth toe metatarsal bone and phalangeal base excision-Op cx positive for E.coli, Pseudomonas (Fluoroquinolone-S), MRSA (Tetracycline & Bactrim-R), E.faecalis- s/p dalbavancin/cipro  left foot 5th MT head OM, previous cx from MT head resection (4/30/24) with MRSA  S/p (5/20/24) exc-debridement, 5th toe amputation and 5th MT shaft Superficial cx with MSSA and Enterococcus     DM2: A1C 8.5     Total time spent with the patient today was 55 minutes, including review of medical records, face-to-face consultation, and post-visit documentation.    Plan:   Will transition Ertapenem to Meropenem for broader coverage given history of PsA and taking Cipro prior to surgery. Continue Daptomycin. Will extend IV antibiotics for another 4 weeks, EOT 3/20/25.  Orders sent to CM for discharge planning. [facility]   Continue   I will arrange for ID follow up at EOT. At that time would recommend PICC removal and cessation of antibiotics. He will have completed almost 10 weeks of IV antibiotics with attempt at foot salvage. Would then recommend pursuing surgical intervention if unable to heal this wound.     Anti-infectives:   Dapto  CTX/Flagyl    Subjective:   Date of Consultation:  February 20, 2025  Referring Physician: Dr. Ferrari    Patient is a 54 y.o. male who presented for planned I&D of left foot wound. He presented to ED for this wound

## 2025-02-20 NOTE — PROGRESS NOTES
Hospitalist Progress Note   Admit Date:  2025  5:06 PM   Name:  Nilay Whittaker   Age:  54 y.o.  Sex:  male  :  1970   MRN:  [id number]   Room:      Presenting/Chief Complaint: Wound Check     Reason(s) for Admission: Osteomyelitis of foot, left, acute (HCC) [M86.172]  History of type 2 diabetes mellitus [Z86.39]  Osteomyelitis of third toe of left foot (HCC) [M86.9]  Osteomyelitis of fourth toe of left foot (HCC) [M86.9]     Hospital Course:     Nilay Whittaker is a 54 y.o. male with pmh DM 2, smoking, HTN, chronic left foot wound who presented to [facility] ED 25 from home due to wound care nurse being concerned for progression of infection to his 3rd left toe. He was d/db from our hospital on 25 with ertapenem and daptomycin for E Coli, MRSA, enterococcos wound on left foot.  He was compliant with abx regimen but reports wound looking progressively worse since he left hospital on .  Imaging done on this admission shows concern for osteomyelitis of 3rd and 4th digits.  Vitals stable. No signs of sepsis.      Subjective & 24hr Events:     Pt is awake, alert, oriented.  Denies pain in foot.        Assessment & Plan:     Principal Problem:    Osteomyelitis of foot, left, acute (HCC)  - MRI for additional imaging   - Cont regimen of Dapto, Rocephin, Flagyl   - ID consult pending   - No new wound cultures obtained but repeat BC NGTD   - Ortho consult pending      DM 2  - Last A1C 8.5   - Humalog SSI while inpatient   - Home oral DM meds on hold while inpatient     HTN (hypertension)  - Cont home regimen Toprol, Norvasc, Lisinopril         Anticipated Discharge Arrangements:   Home Health    PT/OT evals ordered?  Not ordered; patient not expected to need rehab  Diet:  Diet NPO  VTE prophylaxis: SCD's   Code status: Full Code      Non-peripheral Lines and Tubes (if present):          Telemetry (if present):           Hospital Problems:  Principal Problem:

## 2025-02-20 NOTE — CARE COORDINATION
RNCM met with patient and spouse in room 211 to discuss discharge planning.    CM assessment completed and documented in flowsheet. Patient with discharge order for today. Patient discharging home with resumption of [facility] and IV antibiotics provided by [facility]. Patient has met all treatment goals and milestones for discharge. Patient/family in agreement with discharge plan. Family to provide transportation home. CM following until patient is discharged.      02/20/25 1530   Service Assessment   Patient Orientation Alert and Oriented   Cognition Alert   History Provided By Patient;Medical Record   Primary Caregiver Self   Accompanied By/Relationship Spouse   Support Systems Spouse/Significant Other   Patient's Healthcare Decision Maker is: Legal Next of Kin   PCP Verified by CM Yes   Last Visit to PCP Within last 3 months   Prior Functional Level Independent in ADLs/IADLs   Current Functional Level Independent in ADLs/IADLs   Can patient return to prior living arrangement Yes   Ability to make needs known: Good   Family able to assist with home care needs: Yes   Would you like for me to discuss the discharge plan with any other family members/significant others, and if so, who? Yes  (Spouse)   Financial Resources Other (Comment)  (commercial insurance)   Community Resources None   Social/Functional History   Lives With Spouse   Type of Home House   Home Equipment Cane;Crutches   Receives Help From Family   Prior Level of Assist for ADLs Independent   Ambulation Assistance Independent   Prior Level of Assist for Transfers Independent   Active  Yes   Occupation Full time employment   Discharge Planning   Type of Residence House   Living Arrangements Spouse/Significant Other   Current Services Prior To Admission Home Care;Durable Medical Equipment;Home Infusion   Current DME Prior to Arrival Wound Vac   Potential Assistance Purchasing Medications No   Type of Home Care Services Nursing Services

## 2025-02-20 NOTE — WOUND CARE
Left foot wound mostly granular with scattered slough, 2.5x2x2.2cm with 4cm tunnel (11 o'clock) with slough and possible tendon exposure, towards 4th toe that likely is inside the toe under the joint.  Seen with Dr. Duong. Patient declines TMA amputation today and this is what Dr. Duong can offer. Dr. Duong ordered warm soapy soaks, with Iodoform packing and dry dressing daily.  Spouse taught wound care today. Dr. Duong stated to follow up with Infectious disease, wound clinic and Dr. Bravo. The toe redness and pain, worsened with the wound vac, suggesting the tunnel was walled off by the vac instead of pulling fluid from the tunnel.                   Left foot

## 2025-02-21 LAB
FUNGAL CULT/SMEAR: NORMAL
FUNGAL CULT/SMEAR: NORMAL
FUNGUS (MYCOLOGY) CULTURE: NEGATIVE
FUNGUS (MYCOLOGY) CULTURE: NEGATIVE
FUNGUS SMEAR: NORMAL
FUNGUS SMEAR: NORMAL
REFLEX TO ID: NORMAL
REFLEX TO ID: NORMAL
SPECIMEN PROCESSING: NORMAL
SPECIMEN PROCESSING: NORMAL
SPECIMEN SOURCE: NORMAL

## 2025-03-11 ENCOUNTER — TELEPHONE (OUTPATIENT)
Dept: ORTHOPEDIC SURGERY | Age: 55
End: 2025-03-11

## 2025-03-11 NOTE — TELEPHONE ENCOUNTER
Patient will see Dr. Bravo on Thursday. The third toe, on the side, has a new wound. It is open and cracked. The toe is swollen and red. He is still ln IV antibiotics, no fever. Do you want them to do anything before his appt on Thursday? She made pics if you want to see, Please let her know.

## 2025-03-13 ENCOUNTER — OFFICE VISIT (OUTPATIENT)
Dept: ORTHOPEDIC SURGERY | Age: 55
End: 2025-03-13

## 2025-03-13 DIAGNOSIS — M86.9 OSTEOMYELITIS OF FIFTH TOE OF LEFT FOOT (HCC): Primary | ICD-10-CM

## 2025-03-13 PROCEDURE — 99024 POSTOP FOLLOW-UP VISIT: CPT | Performed by: ORTHOPAEDIC SURGERY

## 2025-03-13 NOTE — PROGRESS NOTES
Name: Nilay Whittaker  YOB: 1970  Gender: male  MRN: [id number]    Plan:    Sitting work only until April 13, 2025.  At that point we will revisit his return to work status.  It may be extended at that point.  Continue wound care    Wound has improved and more shallow.  We will get him into wound care.  We do not see improvement in his wound we must consider transmetatarsal amputation         Procedure: Left Foot Debridement Incision And Drainage - Left    Surgery Date: 1/22/2025    1) ertapenem 1 gram IV daily with EOT 2/24/25 AND  Daptomycin 8 mg/kg  IV every day with EOT 2/24/25  2) Routine PICC Care  3) Labs:  Every Monday: CRP, CBC with Differential, Creatinine, LFT, and CPK      Subjective: Denies fevers chills or infection.  Denies any signs of spreading erythema.  He is seeing infectious disease.  He still has a wound VAC.  Wound VAC has a good seal.  His infectious disease appointment is 2/24/2025.  Currently he is taking the antibiotics that are prescribed by the infectious the specialist.  The wound is getting better and the wound care he was coming out to 3 times a week.  3/13/25-he is having at home wound care continue debrided wound address his foot.  He has finished his antibiotics.  He is not improving.  Send at this time.    ROS: Patient Denies fever/chills, headache, visual changes, chest pain, shortness of breath, and nausea/vomiting/diarrhea     Exam:     Wounds: No retained sutures.  Ulcer still remains under the fourth MTP joint with decreasing depth and a much more healthy appearance.  No signs erythema or spreading infection wound still present.  2 symptoms by 2 cm x 2 cm  Vascular: BLE: 2+ DP pulse, toes wwp w/ BCR<2s    Imaging:   Interpretation of imaging and   X-Ray LEFT Foot 3 vw (AP/Lateral/Oblique) for foot pain   Findings: prior 5th ray amputation and 4th metatarsal head resection   Impression:  status post amputation   Signature: Juan Luis Bravo MD

## 2025-03-20 ENCOUNTER — CLINICAL DOCUMENTATION (OUTPATIENT)
Dept: ORTHOPEDIC SURGERY | Age: 55
End: 2025-03-20

## 2025-03-20 ENCOUNTER — HOSPITAL ENCOUNTER (OUTPATIENT)
Dept: WOUND CARE | Age: 55
Discharge: HOME | End: 2025-03-20
Payer: COMMERCIAL

## 2025-03-20 VITALS
BODY MASS INDEX: 36.45 KG/M2 | HEIGHT: 78 IN | SYSTOLIC BLOOD PRESSURE: 164 MMHG | RESPIRATION RATE: 18 BRPM | WEIGHT: 315 LBS | DIASTOLIC BLOOD PRESSURE: 94 MMHG | TEMPERATURE: 97.5 F | HEART RATE: 69 BPM | OXYGEN SATURATION: 100 %

## 2025-03-20 LAB
BACTERIA SPEC CULT: NORMAL
SERVICE CMNT-IMP: NORMAL

## 2025-03-20 PROCEDURE — 11042 DBRDMT SUBQ TIS 1ST 20SQCM/<: CPT

## 2025-03-20 PROCEDURE — 99213 OFFICE O/P EST LOW 20 MIN: CPT

## 2025-03-20 PROCEDURE — 11043 DBRDMT MUSC&/FSCA 1ST 20/<: CPT

## 2025-03-20 NOTE — DISCHARGE INSTRUCTIONS
Instructions:   Left plantar foot:  Cleanse with normal saline.  Vashe Wound Solution soak placed on wound bed for a minimum of 60 seconds prior to dressing application.   Apply silver alginate.  Cover with ABD.  Secure with roll gauze.  Change 3 times weekly.     Left 3rd toe:  Cleanse with normal saline.  Vashe Wound Solution soak placed on wound bed for a minimum of 60 seconds prior to dressing application.   Apply silver alginate.  Cover with ABD.  Secure with roll gauze.  Change 3 times weekly.     Right great toe:  Cleanse with normal saline.  Vashe Wound Solution soak placed on wound bed for a minimum of 60 seconds prior to dressing application.   Apply silver alginate.  Cover with ABD.  Secure with roll gauze.  Change 3 times weekly.     Home Health to change dressings 3 times weekly.     Wear compression stockings daily. Tubigrip applied bilaterally at today's visit.  Increase protein intake to 100 grams daily. Suggest two 30 gram shakes daily.  Vascular study ordered today Dr Ellison.  Total contact cast considered for next visit.  Order Darco shoe with PEG insert same size as shoe online for right foot.  HBO mentioned during visit.

## 2025-03-20 NOTE — FLOWSHEET NOTE
Assessment Eschar dry   Drainage Amount None (dry)   Odor None   Betty-wound Assessment Hyperkeratosis (callous)   Margins Undefined edges   Wound Thickness Description not for Pressure Injury Partial thickness   Pain Assessment   Pain Assessment None - Denies Pain

## 2025-03-20 NOTE — WOUND CARE
KEEP THE WOUND COVERED AT ALL TIMES.    Electronically signed Beka Evans RN on 3/20/2025 at 9:10 AM

## 2025-03-24 ENCOUNTER — TELEPHONE (OUTPATIENT)
Dept: ORTHOPEDIC SURGERY | Age: 55
End: 2025-03-24

## 2025-03-24 PROBLEM — E11.621 DIABETIC ULCER OF TOE OF RIGHT FOOT ASSOCIATED WITH TYPE 2 DIABETES MELLITUS, WITH FAT LAYER EXPOSED: Chronic | Status: ACTIVE | Noted: 2025-03-24

## 2025-03-24 PROBLEM — E11.621 DIABETIC ULCER OF TOE OF RIGHT FOOT ASSOCIATED WITH TYPE 2 DIABETES MELLITUS, WITH FAT LAYER EXPOSED: Status: ACTIVE | Noted: 2025-03-24

## 2025-03-24 PROBLEM — L97.426 DIABETIC ULCER OF LEFT MIDFOOT ASSOCIATED WITH TYPE 2 DIABETES MELLITUS, WITH BONE INVOLVEMENT WITHOUT EVIDENCE OF NECROSIS: Status: ACTIVE | Noted: 2025-03-24

## 2025-03-24 PROBLEM — L97.512 DIABETIC ULCER OF TOE OF RIGHT FOOT ASSOCIATED WITH TYPE 2 DIABETES MELLITUS, WITH FAT LAYER EXPOSED: Status: ACTIVE | Noted: 2025-03-24

## 2025-03-24 PROBLEM — L97.426 DIABETIC ULCER OF LEFT MIDFOOT ASSOCIATED WITH TYPE 2 DIABETES MELLITUS, WITH BONE INVOLVEMENT WITHOUT EVIDENCE OF NECROSIS: Chronic | Status: ACTIVE | Noted: 2025-03-24

## 2025-03-24 PROBLEM — E11.621 DIABETIC ULCER OF LEFT MIDFOOT ASSOCIATED WITH TYPE 2 DIABETES MELLITUS, WITH BONE INVOLVEMENT WITHOUT EVIDENCE OF NECROSIS: Status: ACTIVE | Noted: 2025-03-24

## 2025-03-24 PROBLEM — E11.621 DIABETIC ULCER OF LEFT MIDFOOT ASSOCIATED WITH TYPE 2 DIABETES MELLITUS, WITH BONE INVOLVEMENT WITHOUT EVIDENCE OF NECROSIS: Chronic | Status: ACTIVE | Noted: 2025-03-24

## 2025-03-24 PROBLEM — L97.512 DIABETIC ULCER OF TOE OF RIGHT FOOT ASSOCIATED WITH TYPE 2 DIABETES MELLITUS, WITH FAT LAYER EXPOSED: Chronic | Status: ACTIVE | Noted: 2025-03-24

## 2025-03-24 NOTE — TELEPHONE ENCOUNTER
Aly is calling about his wound care visit. He did see the doctor but has decided he is not going to follow through and go back to wound care. He is very non compliant but she is still doing the wound care orders that Dr. Bravo wanted. She also offered a different doctor and he said no he isn't going to do all that.

## 2025-03-25 ENCOUNTER — TELEPHONE (OUTPATIENT)
Dept: WOUND CARE | Age: 55
End: 2025-03-25

## 2025-03-25 NOTE — TELEPHONE ENCOUNTER
Aly JONES RN with [facility] called to inform the wound center that patient is refusing to come back to the wound center.  He also is not interested in HBO or a cast.

## 2025-04-07 ENCOUNTER — TELEPHONE (OUTPATIENT)
Dept: ORTHOPEDIC SURGERY | Age: 55
End: 2025-04-07

## 2025-04-07 NOTE — TELEPHONE ENCOUNTER
Pt scheduled to see [facility] 4/8 at 9am for Wound check; pt has not been to wound care since 3/24 2/2 frustration regarding Toe callus that was cut down and \"made bigger\" per pt report. Pt refuses to see wound care. Agrees to see MLS for worsening 3rd toe wound per [facility]  RN report

## 2025-04-07 NOTE — TELEPHONE ENCOUNTER
Constantino is calling to report some changes to the wounds on his left foot. His 3rd left toe is more purple and there is a pressure ulcer on that that is getting bigger.

## 2025-04-08 ENCOUNTER — OFFICE VISIT (OUTPATIENT)
Dept: ORTHOPEDIC SURGERY | Age: 55
End: 2025-04-08

## 2025-04-08 ENCOUNTER — HOSPITAL ENCOUNTER (OUTPATIENT)
Dept: ULTRASOUND IMAGING | Age: 55
Discharge: HOME | End: 2025-04-10
Attending: ORTHOPAEDIC SURGERY
Payer: COMMERCIAL

## 2025-04-08 DIAGNOSIS — M79.671 PAIN IN RIGHT FOOT: ICD-10-CM

## 2025-04-08 DIAGNOSIS — L03.032 CELLULITIS OF TOE OF LEFT FOOT: ICD-10-CM

## 2025-04-08 DIAGNOSIS — M86.9 OSTEOMYELITIS OF FIFTH TOE OF LEFT FOOT (HCC): ICD-10-CM

## 2025-04-08 DIAGNOSIS — M86.9 OSTEOMYELITIS OF FIFTH TOE OF LEFT FOOT (HCC): Primary | ICD-10-CM

## 2025-04-08 LAB
VAS LEFT ABI: 0.74
VAS LEFT ARM BP: 126 MMHG
VAS LEFT DORSALIS PEDIS BP: 85 MMHG
VAS LEFT PTA BP: 93 MMHG
VAS RIGHT ABI: 1.06
VAS RIGHT ARM BP: 107 MMHG
VAS RIGHT DORSALIS PEDIS BP: 119 MMHG
VAS RIGHT PTA BP: 134 MMHG
VAS RIGHT TBI: 0.75
VAS RIGHT TOE PRESSURE: 94 MMHG

## 2025-04-08 PROCEDURE — 93922 UPR/L XTREMITY ART 2 LEVELS: CPT

## 2025-04-08 PROCEDURE — 99024 POSTOP FOLLOW-UP VISIT: CPT | Performed by: ORTHOPAEDIC SURGERY

## 2025-04-08 PROCEDURE — 93925 LOWER EXTREMITY STUDY: CPT | Performed by: RADIOLOGY

## 2025-04-08 RX ORDER — OMEPRAZOLE 40 MG/1
CAPSULE, DELAYED RELEASE ORAL
COMMUNITY
Start: 2025-01-31

## 2025-04-08 RX ORDER — ORAL SEMAGLUTIDE 7 MG/1
TABLET ORAL
COMMUNITY
Start: 2025-02-05

## 2025-04-08 RX ORDER — NICOTINE 21 MG/24HR
1 PATCH, TRANSDERMAL 24 HOURS TRANSDERMAL DAILY
COMMUNITY
Start: 2024-12-20

## 2025-04-08 RX ORDER — ASPIRIN 81 MG/1
81 TABLET ORAL DAILY
COMMUNITY

## 2025-04-08 RX ORDER — GLIPIZIDE 10 MG/1
TABLET ORAL
COMMUNITY

## 2025-04-08 RX ORDER — LANCETS
EACH MISCELLANEOUS
COMMUNITY
Start: 2025-03-05

## 2025-04-08 RX ORDER — NAPROXEN 500 MG/1
500 TABLET ORAL EVERY 12 HOURS PRN
COMMUNITY
Start: 2024-12-08

## 2025-04-08 RX ORDER — TAMSULOSIN HYDROCHLORIDE 0.4 MG/1
CAPSULE ORAL
COMMUNITY
Start: 2025-02-16

## 2025-04-08 RX ORDER — ORAL SEMAGLUTIDE 7 MG/1
TABLET ORAL
COMMUNITY
Start: 2025-03-03

## 2025-04-08 NOTE — PROGRESS NOTES
erythema or spreading infection wound still present.  2 symptoms by 2 cm x 2 cm  Vascular: BLE: 2+ DP pulse, toes wwp w/ BCR<2s    Imaging:   Interpretation of imaging and   X-Ray LEFT Foot 3 vw (AP/Lateral/Oblique) for foot pain   Findings: prior 5th ray amputation and 4th metatarsal head resection   Impression:  status post amputation   Signature: Juan Luis Bravo MD     I reviewed the notes and the pictures taken on 3/20/2025 in the wound care center.  Out around a bush.  They describe the left foot wound, the left third toe ulceration and the right great toe wound.  He describes the offloading boot.    Assessment and plan    Still having issues with worsening wounds.  The great toe, third toe in the present line.  He is wanting a transmetatarsal amputation    We will get ABIs to make sure he can heal.  I will see him back afterwards and if ABIs are appropriate we will proceed with transmetatarsal amputation.

## 2025-04-09 DIAGNOSIS — L08.9 LEFT FOOT INFECTION: ICD-10-CM

## 2025-04-14 ENCOUNTER — TELEPHONE (OUTPATIENT)
Dept: ORTHOPEDIC SURGERY | Age: 55
End: 2025-04-14

## 2025-04-14 NOTE — TELEPHONE ENCOUNTER
Constantino is calling to let you know his wounds are not seeming to improve and he wants to increase to twice per week. I have given him verbal orders to increase the frequency.

## 2025-04-24 ENCOUNTER — CLINICAL DOCUMENTATION (OUTPATIENT)
Dept: ORTHOPEDIC SURGERY | Age: 55
End: 2025-04-24

## 2025-04-24 ENCOUNTER — OFFICE VISIT (OUTPATIENT)
Dept: ORTHOPEDIC SURGERY | Age: 55
End: 2025-04-24
Payer: COMMERCIAL

## 2025-04-24 DIAGNOSIS — L03.032 CELLULITIS OF TOE OF LEFT FOOT: ICD-10-CM

## 2025-04-24 DIAGNOSIS — M86.9 OSTEOMYELITIS OF FIFTH TOE OF LEFT FOOT (HCC): Primary | ICD-10-CM

## 2025-04-24 PROCEDURE — 99213 OFFICE O/P EST LOW 20 MIN: CPT | Performed by: ORTHOPAEDIC SURGERY

## 2025-04-24 NOTE — PROGRESS NOTES
Name: Nilay Whittaker  YOB: 1970  Gender: male  MRN: [id number]    Plan:    Nonhealing left foot wound after multiple I&D              Left toe pressure not detectable              will need vascular surgery involvement/evaluation  2.   Right second toe ulceration.  Will most likely need Blunt arthroplasty.  However will need left foot evaluated prior to any surgical involvement     Options at this point consider vascular surgery involvement to improve circulation in the left leg however to promote wound healing or transmetatarsal amputation if that is not possible.           Procedure: Left Foot Debridement Incision And Drainage - Left    Surgery Date: 1/22/2025    1) ertapenem 1 gram IV daily with EOT 2/24/25 AND  Daptomycin 8 mg/kg  IV every day with EOT 2/24/25  2) Routine PICC Care  3) Labs:  Every Monday: CRP, CBC with Differential, Creatinine, LFT, and CPK      Subjective: Denies fevers chills or infection.  Denies any signs of spreading erythema.  He is seeing infectious disease.  He still has a wound VAC.  Wound VAC has a good seal.  His infectious disease appointment is 2/24/2025.  Currently he is taking the antibiotics that are prescribed by the infectious the specialist.  The wound is getting better and the wound care he was coming out to 3 times a week.  3/13/25-he is having at home wound care continue debrided wound address his foot.  He has finished his antibiotics.  He is not improving.  Send at this time.  4/8/25-he has been frustrated with wound care.  He feels that they shaved down a callus or create a new wound.  Therefore he has not been going to wound care.  He has taken himself out of the wound care center.  He has a right great toe wound as well as a recurrent left foot wound.  Wound care nurses are still coming out of his house but he is not going to wound care center anymore.  The wound care center was placed into an offloading boot but he does not wear

## 2025-04-28 ENCOUNTER — PREP FOR PROCEDURE (OUTPATIENT)
Dept: VASCULAR SURGERY | Age: 55
End: 2025-04-28

## 2025-04-28 ENCOUNTER — OFFICE VISIT (OUTPATIENT)
Dept: VASCULAR SURGERY | Age: 55
End: 2025-04-28
Payer: COMMERCIAL

## 2025-04-28 VITALS
OXYGEN SATURATION: 96 % | SYSTOLIC BLOOD PRESSURE: 128 MMHG | HEART RATE: 65 BPM | DIASTOLIC BLOOD PRESSURE: 80 MMHG | WEIGHT: 315 LBS | HEIGHT: 78 IN | BODY MASS INDEX: 36.45 KG/M2

## 2025-04-28 DIAGNOSIS — L97.426 DIABETIC ULCER OF LEFT MIDFOOT ASSOCIATED WITH TYPE 2 DIABETES MELLITUS, WITH BONE INVOLVEMENT WITHOUT EVIDENCE OF NECROSIS (HCC): Chronic | ICD-10-CM

## 2025-04-28 DIAGNOSIS — E13.621 OTHER SPECIFIED DIABETES MELLITUS WITH FOOT ULCER, UNSPECIFIED WHETHER LONG TERM INSULIN USE (HCC): Primary | ICD-10-CM

## 2025-04-28 DIAGNOSIS — E11.621 DIABETIC ULCER OF LEFT MIDFOOT ASSOCIATED WITH TYPE 2 DIABETES MELLITUS, WITH BONE INVOLVEMENT WITHOUT EVIDENCE OF NECROSIS (HCC): Chronic | ICD-10-CM

## 2025-04-28 DIAGNOSIS — L97.509 OTHER SPECIFIED DIABETES MELLITUS WITH FOOT ULCER, UNSPECIFIED WHETHER LONG TERM INSULIN USE (HCC): Primary | ICD-10-CM

## 2025-04-28 DIAGNOSIS — E11.628 DIABETIC INFECTION OF LEFT FOOT (HCC): ICD-10-CM

## 2025-04-28 DIAGNOSIS — L08.9 DIABETIC INFECTION OF LEFT FOOT (HCC): ICD-10-CM

## 2025-04-28 DIAGNOSIS — I70.262 CRITICAL LIMB ISCHEMIA OF LEFT LOWER EXTREMITY WITH GANGRENE (HCC): ICD-10-CM

## 2025-04-28 PROCEDURE — 99205 OFFICE O/P NEW HI 60 MIN: CPT | Performed by: STUDENT IN AN ORGANIZED HEALTH CARE EDUCATION/TRAINING PROGRAM

## 2025-04-28 PROCEDURE — 3074F SYST BP LT 130 MM HG: CPT | Performed by: STUDENT IN AN ORGANIZED HEALTH CARE EDUCATION/TRAINING PROGRAM

## 2025-04-28 PROCEDURE — 3078F DIAST BP <80 MM HG: CPT | Performed by: STUDENT IN AN ORGANIZED HEALTH CARE EDUCATION/TRAINING PROGRAM

## 2025-04-28 PROCEDURE — 3052F HG A1C>EQUAL 8.0%<EQUAL 9.0%: CPT | Performed by: STUDENT IN AN ORGANIZED HEALTH CARE EDUCATION/TRAINING PROGRAM

## 2025-04-28 NOTE — PROGRESS NOTES
VASCULAR SURGERY   [address] [address]  [phone] FAX: [phone]    Nilay Whittaker  : 1970    Reason for visit: Preop evaluation left foot nonhealing wound    Chief Complaint: 54 y.o. male presents for preop evaluation for left foot nonhealing wound.  Patient originally treated by Dr. Bravo with multiple left foot debridements with left fifth metatarsal head excision and nonhealing wounds.  Duplex ultrasound with short segment occlusion of the left distal SFA as well as 50 to 75% stenosis of the popliteal artery with monophasic tibial flow.  Toe pressure on the left is 22 mmHg.     Plan:   Non-healing left foot wound with infection: Ultrasound indicates left SFA occlusion and popliteal stenosis with tibial flow.  Patient has reduced ABIs and adequate for wound healing left lower extremity for any type of foot or ankle surgery.  Would recommend left lower extremity angiogram with possible intervention of SFA occlusion.  If unable to perform endovascular treatment patient would likely require left lower extremity bypass for adequate arterial flow to the foot for wound healing.  Additionally discussed with patient that even with inline flow to the foot patient may have significant small vessel disease that may limit wound healing of the foot.  Patient informed risk benefits and returns and wishes to proceed with surgery.  Patient high risk for perioperative complication given active infection of left foot wound.    Physical Examination:   Height: 2.032 m (6' 8\"), Weight - Scale: (!) 155.1 kg (342 lb), BP: 128/80    General: No acute distress  HENT: AT  CV: Regular rhythm.    LUNG: No respiratory distress on RA  Abdominal: No distension.  Extremities: Left foot wounds of the left plantar surface and 2nd and 4th toe ulcerations, dry eschar to the left first toe  Vascular:   Radial:  L    2+     R    2+    Femoral: L    2+     R    2+  DP:   L    dop     R

## 2025-04-28 NOTE — H&P (VIEW-ONLY)
VASCULAR SURGERY   [address] [address]  [phone] FAX: [phone]    Nilay Whittaker  : 1970    Reason for visit: Preop evaluation left foot nonhealing wound    Chief Complaint: 54 y.o. male presents for preop evaluation for left foot nonhealing wound.  Patient originally treated by Dr. Bravo with multiple left foot debridements with left fifth metatarsal head excision and nonhealing wounds.  Duplex ultrasound with short segment occlusion of the left distal SFA as well as 50 to 75% stenosis of the popliteal artery with monophasic tibial flow.  Toe pressure on the left is 22 mmHg.     Plan:   Non-healing left foot wound with infection: Ultrasound indicates left SFA occlusion and popliteal stenosis with tibial flow.  Patient has reduced ABIs and adequate for wound healing left lower extremity for any type of foot or ankle surgery.  Would recommend left lower extremity angiogram with possible intervention of SFA occlusion.  If unable to perform endovascular treatment patient would likely require left lower extremity bypass for adequate arterial flow to the foot for wound healing.  Additionally discussed with patient that even with inline flow to the foot patient may have significant small vessel disease that may limit wound healing of the foot.  Patient informed risk benefits and returns and wishes to proceed with surgery.  Patient high risk for perioperative complication given active infection of left foot wound.    Physical Examination:   Height: 2.032 m (6' 8\"), Weight - Scale: (!) 155.1 kg (342 lb), BP: 128/80    General: No acute distress  HENT: AT  CV: Regular rhythm.    LUNG: No respiratory distress on RA  Abdominal: No distension.  Extremities: Left foot wounds of the left plantar surface and 2nd and 4th toe ulcerations, dry eschar to the left first toe  Vascular:   Radial:  L    2+     R    2+    Femoral: L    2+     R    2+  DP:   L    dop     R

## 2025-04-30 NOTE — PERIOP NOTE
Patient verified name and .  Order for consent NOT found in EHR at time of PAT visit. Unable to verify case posting against order; surgery verified by patient.    Type 1B surgery, phone assessment complete.  Orders not received.  Labs per surgeon: none  Labs per anesthesia protocol: poc glucose    Patient answered medical/surgical history questions at their best of ability. All prior to admission medications documented in EPIC.    Patient instructed to continue taking all prescription medications up to the day of surgery but to take only the following medications the day of surgery according to anesthesia guidelines with a small sip of water: rybelsus Also, patient is requested to take 2 Tylenol in the morning and then again before bed on the day before surgery. Regular or extra strength may be used.       Patient informed that all vitamins and supplements should be held 7 days prior to surgery and NSAIDS 5 days prior to surgery. Prescription meds to hold:Empaglifloxin (Jardiance) hold for 3 days prior to surgery    Per anesthesia protocol for rybelsus. Patient instructed to be on a clear liquid diet the day prior to the procedure and no food after midnight, patient may drink clear liquids up until 4 hours prior to arrival. No gum, candy, mints.     The GLP-1 agonists are associated with adverse gastrointestinal effects such as nausea, vomiting, and delayed gastric emptying. Delayed gastric emptying from GLP-1 agonists can increase the risk of regurgitation and pulmonary aspiration of gastric contents during general anesthesia and deep sedation.     The recommendation for patients taking Glucagon-Like Peptide-1 (GLP-1) Receptor Agonists is to keep a clear liquid diet the day before your surgery and up to 4 hours prior to your hospital arrival time. The accepted clear liquids are included below.     CLEAR LIQUID DIET    Things included in a clear liquid diet:     Water  Black Coffee (may have sugar but no milk

## 2025-05-02 ENCOUNTER — HOSPITAL ENCOUNTER (OUTPATIENT)
Age: 55
Setting detail: OUTPATIENT SURGERY
Discharge: HOME | End: 2025-05-02
Attending: STUDENT IN AN ORGANIZED HEALTH CARE EDUCATION/TRAINING PROGRAM | Admitting: STUDENT IN AN ORGANIZED HEALTH CARE EDUCATION/TRAINING PROGRAM
Payer: COMMERCIAL

## 2025-05-02 ENCOUNTER — ANESTHESIA EVENT (OUTPATIENT)
Dept: SURGERY | Age: 55
End: 2025-05-02
Payer: COMMERCIAL

## 2025-05-02 ENCOUNTER — ANESTHESIA (OUTPATIENT)
Dept: SURGERY | Age: 55
End: 2025-05-02
Payer: COMMERCIAL

## 2025-05-02 ENCOUNTER — APPOINTMENT (OUTPATIENT)
Dept: INTERVENTIONAL RADIOLOGY/VASCULAR | Age: 55
End: 2025-05-02
Attending: STUDENT IN AN ORGANIZED HEALTH CARE EDUCATION/TRAINING PROGRAM
Payer: COMMERCIAL

## 2025-05-02 VITALS
HEART RATE: 84 BPM | OXYGEN SATURATION: 92 % | WEIGHT: 315 LBS | TEMPERATURE: 97.7 F | BODY MASS INDEX: 36.45 KG/M2 | RESPIRATION RATE: 16 BRPM | DIASTOLIC BLOOD PRESSURE: 56 MMHG | SYSTOLIC BLOOD PRESSURE: 105 MMHG | HEIGHT: 78 IN

## 2025-05-02 LAB
GLUCOSE BLD STRIP.AUTO-MCNC: 188 MG/DL (ref 65–100)
SERVICE CMNT-IMP: ABNORMAL

## 2025-05-02 PROCEDURE — 2500000003 HC RX 250 WO HCPCS: Performed by: NURSE ANESTHETIST, CERTIFIED REGISTERED

## 2025-05-02 PROCEDURE — 7100000000 HC PACU RECOVERY - FIRST 15 MIN: Performed by: STUDENT IN AN ORGANIZED HEALTH CARE EDUCATION/TRAINING PROGRAM

## 2025-05-02 PROCEDURE — 6360000002 HC RX W HCPCS: Performed by: NURSE ANESTHETIST, CERTIFIED REGISTERED

## 2025-05-02 PROCEDURE — 3700000000 HC ANESTHESIA ATTENDED CARE: Performed by: STUDENT IN AN ORGANIZED HEALTH CARE EDUCATION/TRAINING PROGRAM

## 2025-05-02 PROCEDURE — 3600000007 HC SURGERY HYBRID BASE: Performed by: STUDENT IN AN ORGANIZED HEALTH CARE EDUCATION/TRAINING PROGRAM

## 2025-05-02 PROCEDURE — 2580000003 HC RX 258: Performed by: ANESTHESIOLOGY

## 2025-05-02 PROCEDURE — 82962 GLUCOSE BLOOD TEST: CPT

## 2025-05-02 PROCEDURE — 6370000000 HC RX 637 (ALT 250 FOR IP): Performed by: STUDENT IN AN ORGANIZED HEALTH CARE EDUCATION/TRAINING PROGRAM

## 2025-05-02 PROCEDURE — 7100000011 HC PHASE II RECOVERY - ADDTL 15 MIN: Performed by: STUDENT IN AN ORGANIZED HEALTH CARE EDUCATION/TRAINING PROGRAM

## 2025-05-02 PROCEDURE — 6360000002 HC RX W HCPCS

## 2025-05-02 PROCEDURE — 75710 ARTERY X-RAYS ARM/LEG: CPT

## 2025-05-02 PROCEDURE — 6370000000 HC RX 637 (ALT 250 FOR IP): Performed by: ANESTHESIOLOGY

## 2025-05-02 PROCEDURE — 6360000002 HC RX W HCPCS: Performed by: STUDENT IN AN ORGANIZED HEALTH CARE EDUCATION/TRAINING PROGRAM

## 2025-05-02 PROCEDURE — 3600000017 HC SURGERY HYBRID ADDL 15MIN: Performed by: STUDENT IN AN ORGANIZED HEALTH CARE EDUCATION/TRAINING PROGRAM

## 2025-05-02 PROCEDURE — C1874 STENT, COATED/COV W/DEL SYS: HCPCS | Performed by: STUDENT IN AN ORGANIZED HEALTH CARE EDUCATION/TRAINING PROGRAM

## 2025-05-02 PROCEDURE — 6370000000 HC RX 637 (ALT 250 FOR IP)

## 2025-05-02 PROCEDURE — 3700000001 HC ADD 15 MINUTES (ANESTHESIA): Performed by: STUDENT IN AN ORGANIZED HEALTH CARE EDUCATION/TRAINING PROGRAM

## 2025-05-02 PROCEDURE — C1769 GUIDE WIRE: HCPCS

## 2025-05-02 PROCEDURE — 2500000003 HC RX 250 WO HCPCS

## 2025-05-02 PROCEDURE — 6360000004 HC RX CONTRAST MEDICATION: Performed by: STUDENT IN AN ORGANIZED HEALTH CARE EDUCATION/TRAINING PROGRAM

## 2025-05-02 PROCEDURE — 7100000001 HC PACU RECOVERY - ADDTL 15 MIN: Performed by: STUDENT IN AN ORGANIZED HEALTH CARE EDUCATION/TRAINING PROGRAM

## 2025-05-02 PROCEDURE — 7100000010 HC PHASE II RECOVERY - FIRST 15 MIN: Performed by: STUDENT IN AN ORGANIZED HEALTH CARE EDUCATION/TRAINING PROGRAM

## 2025-05-02 DEVICE — ZILVER PTX, DRUG-ELUTING PERIPHERAL STENT
Type: IMPLANTABLE DEVICE | Site: GROIN | Status: FUNCTIONAL
Brand: ZILVER PTX

## 2025-05-02 RX ORDER — ALBUTEROL SULFATE 90 UG/1
INHALANT RESPIRATORY (INHALATION)
Status: DISCONTINUED | OUTPATIENT
Start: 2025-05-02 | End: 2025-05-02 | Stop reason: SDUPTHER

## 2025-05-02 RX ORDER — PHENYLEPHRINE HYDROCHLORIDE 10 MG/ML
INJECTION INTRAVENOUS
Status: DISCONTINUED | OUTPATIENT
Start: 2025-05-02 | End: 2025-05-02 | Stop reason: SDUPTHER

## 2025-05-02 RX ORDER — SUCCINYLCHOLINE/SOD CL,ISO/PF 200MG/10ML
SYRINGE (ML) INTRAVENOUS
Status: DISCONTINUED | OUTPATIENT
Start: 2025-05-02 | End: 2025-05-02 | Stop reason: SDUPTHER

## 2025-05-02 RX ORDER — CLOPIDOGREL BISULFATE 75 MG/1
75 TABLET ORAL DAILY
Qty: 90 TABLET | Refills: 0 | Status: SHIPPED | OUTPATIENT
Start: 2025-05-02 | End: 2025-07-31

## 2025-05-02 RX ORDER — LIDOCAINE HYDROCHLORIDE 10 MG/ML
INJECTION, SOLUTION INFILTRATION; PERINEURAL PRN
Status: DISCONTINUED | OUTPATIENT
Start: 2025-05-02 | End: 2025-05-02 | Stop reason: ALTCHOICE

## 2025-05-02 RX ORDER — FENTANYL CITRATE 50 UG/ML
INJECTION, SOLUTION INTRAMUSCULAR; INTRAVENOUS
Status: DISCONTINUED | OUTPATIENT
Start: 2025-05-02 | End: 2025-05-02 | Stop reason: SDUPTHER

## 2025-05-02 RX ORDER — IOPAMIDOL 612 MG/ML
INJECTION, SOLUTION INTRAVASCULAR PRN
Status: DISCONTINUED | OUTPATIENT
Start: 2025-05-02 | End: 2025-05-02 | Stop reason: ALTCHOICE

## 2025-05-02 RX ORDER — SODIUM CHLORIDE 9 MG/ML
INJECTION, SOLUTION INTRAVENOUS PRN
Status: DISCONTINUED | OUTPATIENT
Start: 2025-05-02 | End: 2025-05-02 | Stop reason: HOSPADM

## 2025-05-02 RX ORDER — SODIUM CHLORIDE, SODIUM LACTATE, POTASSIUM CHLORIDE, CALCIUM CHLORIDE 600; 310; 30; 20 MG/100ML; MG/100ML; MG/100ML; MG/100ML
INJECTION, SOLUTION INTRAVENOUS CONTINUOUS
Status: DISCONTINUED | OUTPATIENT
Start: 2025-05-02 | End: 2025-05-02 | Stop reason: HOSPADM

## 2025-05-02 RX ORDER — ONDANSETRON 2 MG/ML
4 INJECTION INTRAMUSCULAR; INTRAVENOUS
Status: DISCONTINUED | OUTPATIENT
Start: 2025-05-02 | End: 2025-05-02 | Stop reason: HOSPADM

## 2025-05-02 RX ORDER — NEOSTIGMINE METHYLSULFATE 1 MG/ML
INJECTION, SOLUTION INTRAVENOUS
Status: DISCONTINUED | OUTPATIENT
Start: 2025-05-02 | End: 2025-05-02 | Stop reason: SDUPTHER

## 2025-05-02 RX ORDER — SODIUM CHLORIDE 0.9 % (FLUSH) 0.9 %
5-40 SYRINGE (ML) INJECTION PRN
Status: DISCONTINUED | OUTPATIENT
Start: 2025-05-02 | End: 2025-05-02 | Stop reason: HOSPADM

## 2025-05-02 RX ORDER — ONDANSETRON 2 MG/ML
INJECTION INTRAMUSCULAR; INTRAVENOUS
Status: DISCONTINUED | OUTPATIENT
Start: 2025-05-02 | End: 2025-05-02 | Stop reason: SDUPTHER

## 2025-05-02 RX ORDER — LIDOCAINE HCL/PF 100 MG/5ML
SYRINGE (ML) INJECTION
Status: DISCONTINUED | OUTPATIENT
Start: 2025-05-02 | End: 2025-05-02 | Stop reason: SDUPTHER

## 2025-05-02 RX ORDER — PROCHLORPERAZINE EDISYLATE 5 MG/ML
5 INJECTION INTRAMUSCULAR; INTRAVENOUS
Status: DISCONTINUED | OUTPATIENT
Start: 2025-05-02 | End: 2025-05-02 | Stop reason: HOSPADM

## 2025-05-02 RX ORDER — SODIUM CHLORIDE 0.9 % (FLUSH) 0.9 %
5-40 SYRINGE (ML) INJECTION EVERY 12 HOURS SCHEDULED
Status: DISCONTINUED | OUTPATIENT
Start: 2025-05-02 | End: 2025-05-02 | Stop reason: HOSPADM

## 2025-05-02 RX ORDER — FAMOTIDINE 20 MG/1
20 TABLET, FILM COATED ORAL 2 TIMES DAILY
Qty: 60 TABLET | Refills: 3 | Status: SHIPPED | OUTPATIENT
Start: 2025-05-02

## 2025-05-02 RX ORDER — OXYCODONE HYDROCHLORIDE 5 MG/1
5 TABLET ORAL
Status: DISCONTINUED | OUTPATIENT
Start: 2025-05-02 | End: 2025-05-02 | Stop reason: HOSPADM

## 2025-05-02 RX ORDER — GLYCOPYRROLATE 0.2 MG/ML
INJECTION INTRAMUSCULAR; INTRAVENOUS
Status: DISCONTINUED | OUTPATIENT
Start: 2025-05-02 | End: 2025-05-02 | Stop reason: SDUPTHER

## 2025-05-02 RX ORDER — HEPARIN SODIUM 1000 [USP'U]/ML
INJECTION, SOLUTION INTRAVENOUS; SUBCUTANEOUS
Status: DISCONTINUED | OUTPATIENT
Start: 2025-05-02 | End: 2025-05-02 | Stop reason: SDUPTHER

## 2025-05-02 RX ORDER — ASPIRIN 325 MG
325 TABLET ORAL DAILY
Status: DISCONTINUED | OUTPATIENT
Start: 2025-05-02 | End: 2025-05-02 | Stop reason: HOSPADM

## 2025-05-02 RX ORDER — ROCURONIUM BROMIDE 10 MG/ML
INJECTION, SOLUTION INTRAVENOUS
Status: DISCONTINUED | OUTPATIENT
Start: 2025-05-02 | End: 2025-05-02 | Stop reason: SDUPTHER

## 2025-05-02 RX ORDER — HEPARIN SODIUM 200 [USP'U]/100ML
INJECTION, SOLUTION INTRAVENOUS CONTINUOUS PRN
Status: COMPLETED | OUTPATIENT
Start: 2025-05-02 | End: 2025-05-02

## 2025-05-02 RX ORDER — CLOPIDOGREL BISULFATE 75 MG/1
300 TABLET ORAL ONCE
Status: COMPLETED | OUTPATIENT
Start: 2025-05-02 | End: 2025-05-02

## 2025-05-02 RX ORDER — PROPOFOL 10 MG/ML
INJECTION, EMULSION INTRAVENOUS
Status: DISCONTINUED | OUTPATIENT
Start: 2025-05-02 | End: 2025-05-02 | Stop reason: SDUPTHER

## 2025-05-02 RX ORDER — LIDOCAINE HYDROCHLORIDE 10 MG/ML
1 INJECTION, SOLUTION INFILTRATION; PERINEURAL
Status: DISCONTINUED | OUTPATIENT
Start: 2025-05-02 | End: 2025-05-02 | Stop reason: HOSPADM

## 2025-05-02 RX ORDER — NALOXONE HYDROCHLORIDE 0.4 MG/ML
INJECTION, SOLUTION INTRAMUSCULAR; INTRAVENOUS; SUBCUTANEOUS PRN
Status: DISCONTINUED | OUTPATIENT
Start: 2025-05-02 | End: 2025-05-02 | Stop reason: HOSPADM

## 2025-05-02 RX ORDER — ACETAMINOPHEN 500 MG
1000 TABLET ORAL ONCE
Status: COMPLETED | OUTPATIENT
Start: 2025-05-02 | End: 2025-05-02

## 2025-05-02 RX ADMIN — ROCURONIUM BROMIDE 30 MG: 50 INJECTION INTRAVENOUS at 12:13

## 2025-05-02 RX ADMIN — EPHEDRINE SULFATE 10 MG: 5 INJECTION INTRAVENOUS at 12:38

## 2025-05-02 RX ADMIN — HEPARIN SODIUM 10000 UNITS: 1000 INJECTION, SOLUTION INTRAVENOUS; SUBCUTANEOUS at 12:37

## 2025-05-02 RX ADMIN — PHENYLEPHRINE HYDROCHLORIDE 100 MCG: 10 INJECTION INTRAVENOUS at 12:30

## 2025-05-02 RX ADMIN — PHENYLEPHRINE HYDROCHLORIDE 200 MCG: 10 INJECTION INTRAVENOUS at 12:59

## 2025-05-02 RX ADMIN — GLYCOPYRROLATE 0.8 MG: 0.2 INJECTION INTRAMUSCULAR; INTRAVENOUS at 13:09

## 2025-05-02 RX ADMIN — ASPIRIN 325 MG: 325 TABLET, FILM COATED ORAL at 14:15

## 2025-05-02 RX ADMIN — PHENYLEPHRINE HYDROCHLORIDE 100 MCG: 10 INJECTION INTRAVENOUS at 12:29

## 2025-05-02 RX ADMIN — FENTANYL CITRATE 100 MCG: 50 INJECTION, SOLUTION INTRAMUSCULAR; INTRAVENOUS at 12:04

## 2025-05-02 RX ADMIN — EPHEDRINE SULFATE 5 MG: 5 INJECTION INTRAVENOUS at 12:59

## 2025-05-02 RX ADMIN — PHENYLEPHRINE HYDROCHLORIDE 200 MCG: 10 INJECTION INTRAVENOUS at 12:47

## 2025-05-02 RX ADMIN — Medication 5 MG: at 13:09

## 2025-05-02 RX ADMIN — PROPOFOL 100 MG: 10 INJECTION, EMULSION INTRAVENOUS at 12:10

## 2025-05-02 RX ADMIN — ALBUTEROL SULFATE 8 PUFF: 90 AEROSOL, METERED RESPIRATORY (INHALATION) at 13:14

## 2025-05-02 RX ADMIN — EPHEDRINE SULFATE 10 MG: 5 INJECTION INTRAVENOUS at 12:32

## 2025-05-02 RX ADMIN — EPHEDRINE SULFATE 10 MG: 5 INJECTION INTRAVENOUS at 12:47

## 2025-05-02 RX ADMIN — PHENYLEPHRINE HYDROCHLORIDE 200 MCG: 10 INJECTION INTRAVENOUS at 12:38

## 2025-05-02 RX ADMIN — ONDANSETRON 4 MG: 2 INJECTION, SOLUTION INTRAMUSCULAR; INTRAVENOUS at 12:23

## 2025-05-02 RX ADMIN — Medication 170 MG: at 12:04

## 2025-05-02 RX ADMIN — LIDOCAINE HYDROCHLORIDE 80 MG: 20 INJECTION INTRAVENOUS at 12:04

## 2025-05-02 RX ADMIN — Medication 3000 MG: at 12:13

## 2025-05-02 RX ADMIN — ACETAMINOPHEN 1000 MG: 500 TABLET, FILM COATED ORAL at 11:20

## 2025-05-02 RX ADMIN — HEPARIN SODIUM 5000 UNITS: 1000 INJECTION, SOLUTION INTRAVENOUS; SUBCUTANEOUS at 12:24

## 2025-05-02 RX ADMIN — SODIUM CHLORIDE, SODIUM LACTATE, POTASSIUM CHLORIDE, AND CALCIUM CHLORIDE: .6; .31; .03; .02 INJECTION, SOLUTION INTRAVENOUS at 11:21

## 2025-05-02 RX ADMIN — ROCURONIUM BROMIDE 5 MG: 50 INJECTION INTRAVENOUS at 12:04

## 2025-05-02 RX ADMIN — CLOPIDOGREL 300 MG: 75 TABLET, FILM COATED ORAL at 14:15

## 2025-05-02 RX ADMIN — PROPOFOL 200 MG: 10 INJECTION, EMULSION INTRAVENOUS at 12:04

## 2025-05-02 ASSESSMENT — LIFESTYLE VARIABLES: SMOKING_STATUS: 1

## 2025-05-02 ASSESSMENT — PAIN - FUNCTIONAL ASSESSMENT: PAIN_FUNCTIONAL_ASSESSMENT: 0-10

## 2025-05-02 NOTE — ANESTHESIA PRE PROCEDURE
Department of Anesthesiology  Preprocedure Note       Name:  Nilay Whittaker   Age:  54 y.o.  :  1970                                          MRN:  [id number]         Date:  2025      Surgeon: Surgeon(s):  Trevor Ellison MD    Procedure: Procedure(s):  LEFT LEG ARTERIOGRAM WITH POSSIBLE INTERVENTION    Medications prior to admission:   Prior to Admission medications    Medication Sig Start Date End Date Taking? Authorizing Provider   aspirin 81 MG EC tablet Take 1 tablet by mouth nightly   Yes ProviderVi MD   naproxen (NAPROSYN) 500 MG tablet Take 1 tablet by mouth every 12 hours as needed 24  Yes Provider, MD Vi   omeprazole (PRILOSEC) 40 MG delayed release capsule nightly 25  Yes ProviderVi MD   RYBELSUS 7 MG TABS TAKE 1 TABLET BY MOUTH ONCE DAILY ON AN EMPTY STOMACH 30 MINTUES BEFORE MORNING MEAL 25  Yes ProviderVi MD   tamsulosin (FLOMAX) 0.4 MG capsule as needed  Patient taking differently: nightly 25  Yes Provider, MD Vi   metoprolol succinate (TOPROL XL) 50 MG extended release tablet Take 1 tablet by mouth nightly   Yes Provider, MD Vi   metFORMIN (GLUCOPHAGE) 1000 MG tablet Take 2 tablets by mouth at bedtime   Yes Provider, MD Vi   amLODIPine (NORVASC) 10 MG tablet Take 1 tablet by mouth nightly   Yes Provider, MD Vi   lisinopril (PRINIVIL;ZESTRIL) 40 MG tablet Take 1 tablet by mouth nightly   Yes Provider, MD Vi   cetirizine (ZYRTEC) 10 MG tablet Take 1 tablet by mouth at bedtime   Yes Provider, MD Vi   empagliflozin (JARDIANCE) 25 MG tablet Take 1 tablet by mouth at bedtime   Yes Provider, MD Vi   montelukast (SINGULAIR) 10 MG tablet Take 1 tablet by mouth nightly   Yes ProviderVi MD   pregabalin (LYRICA) 300 MG capsule Take 1 capsule by mouth 2 times daily.   Yes ProviderVi MD   Accu-Chek Softclix Lancets [facility]  3/5/25   Provider

## 2025-05-02 NOTE — INTERVAL H&P NOTE
Update History & Physical    The patient's History and Physical of April 28, 2025 was reviewed with the patient and I examined the patient. There was no change. The surgical site was confirmed by the patient and me.     Plan: The risks, benefits, expected outcome, and alternative to the recommended procedure have been discussed with the patient. Patient understands and wants to proceed with the procedure.     Electronically signed by Trevor Ellison MD on 5/2/2025 at 11:29 AM

## 2025-05-02 NOTE — PROGRESS NOTES
SPIRITUAL HEALTH  Note for Med/Surg Visit                  Room # MOR/PL    Name: Nilay Whittaker           Age: 54 y.o.    Gender: male          MRN: [id number]  Zoroastrian: Orthodox       Preferred Language: English    Date: 05/02/25  Visit Time: Begin Time: (P) 1330 End Time : (P) 1340 Complexity of Encounter: (P) Low      Visit Summary:  prayed with patient in anticipation of their procedure as per previous request. Patient expressed gratitude.  offered further support at patient's request.      Referral/Consult From: (P) Rounding   Encounter Overview/Reason: (P) Spiritual/Emotional Needs, Post-Procedural  Encounter Code:     Crisis (if applicable):    Service Provided For: (P) Patient     Patient was available.    Denisse, Belief, Meaning:   Patient identifies as spiritual  Family/Friends   Rituals (if applicable)      Importance and Influence:  Patient does not have spiritual/personal beliefs that influence decisions regarding their health  Family/Friends     Community:  Patient   unable to assess at this time   Family/Friends       Assessment and Plan of Care:   Emotions Expressed by Patient:   Assessment: (P) Calm    Interventions by :   Intervention: (P) Active listening, Prayer (assurance of)/[place], Sustaining Presence/Ministry of presence     Result/ Response by Patient:   Outcome: (P) Acceptance, Receptive    Patient Plan of Care:   Plan and Referrals  Plan/Referrals: (P) Continue Support (comment)     Electronically signed by Chaplain ALLIE, on 5/2/2025 at 1:43 PM.  [facility]  [phone]    [de-identified] : 8yr old m c/o tire,stomach ache,nausea [FreeTextEntry6] : mild congestion and cough\par no fever\par sister came home for weekend from college with virus

## 2025-05-02 NOTE — DISCHARGE INSTRUCTIONS
Arteriogram: What to Expect at Home  Your Recovery  The doctor inserted a thin, flexible tube (catheter) into a blood vessel in your groin. In some cases, the catheter is placed in a blood vessel in the arm.  After an arteriogram, your groin or arm may have a bruise and feel sore for a day or two. You can do light activities around the house but nothing strenuous for several days.  Your doctor may give you specific instructions on when you can do your normal activities again, such as driving and going back to work.  This care sheet gives you a general idea about how long it will take for you to recover. But each person recovers at a different pace. Follow the steps below to feel better as quickly as possible.  How can you care for yourself at home?  Activity  Do not do strenuous exercise and do not lift, pull, or push anything heavy until your doctor says it is okay. This may be for a day or two. You can walk around the house and do light activity, such as cooking.  You may shower 24 to 48 hours after the procedure, if your doctor okays it. Pat the incision dry. Do not take a bath for 1 week, or until your doctor tells you it is okay.  If the catheter was placed in your groin, try not to walk up stairs for the first couple of days.  If your doctor recommends it, get more exercise. Walking is a good choice. Bit by bit, increase the amount you walk every day. Try for at least 30 minutes on most days of the week.  Diet  Drink plenty of fluids to help your body flush out the dye. If you have kidney, heart, or liver disease and have to limit fluids, talk with your doctor before you increase the amount of fluids you drink.  You can eat your normal diet. If your stomach is upset, try bland, low-fat foods like plain rice, broiled chicken, toast, and yogurt.  Medicines  Be safe with medicines. Read and follow all instructions on the label.  If the doctor gave you a prescription medicine for pain, take it as prescribed.  If  you are not taking a prescription pain medicine, ask your doctor if you can take an over-the-counter medicine.  If you take blood thinners, such as warfarin (Coumadin), clopidogrel (Plavix), or aspirin, be sure to talk to your doctor. He or she will tell you if and when to start taking those medicines again. Make sure that you understand exactly what your doctor wants you to do.  Your doctor will tell you if and when you can restart your medicines. He or she will also give you instructions about taking any new medicines.  Care of the catheter site  You will have a dressing over the cut (incision). A dressing helps the incision heal and protects it. Your doctor will tell you how to take care of this.  Put ice or a cold pack on the area for 10 to 20 minutes at a time to help with soreness or swelling. Put a thin cloth between the ice and your skin.  Follow-up care is a key part of your treatment and safety. Be sure to make and go to all appointments, and call your doctor if you are having problems. It's also a good idea to know your test results and keep a list of the medicines you take.  When should you call for help?  Call 911 anytime you think you may need emergency care. For example, call if:  You passed out (lost consciousness).  You have severe trouble breathing.  You have sudden chest pain and shortness of breath, or you cough up blood.  Call your doctor now or seek immediate medical care if:  You are bleeding from the area where the catheter was put in your artery.  You have a fast-growing, painful lump at the catheter site.  You have signs of infection, such as:  Increased pain, swelling, warmth, or redness.  Red streaks leading from the incision.  Pus draining from the incision.  A fever.  After general anesthesia or intravenous sedation, for 24 hours or while taking prescription Narcotics:  Limit your activities  A responsible adult needs to be with you for the next 24 hours  Do not drive and operate

## 2025-05-02 NOTE — ANESTHESIA POSTPROCEDURE EVALUATION
Department of Anesthesiology  Postprocedure Note    Patient: Nilay Whittaker  MRN: [id number]  YOB: 1970  Date of evaluation: 5/2/2025    Procedure Summary       Date: 05/02/25 Room / Location: [facility] MAIN OR [address] [place] [address] MAIN OR    Anesthesia Start: 1154 Anesthesia Stop: 1326    Procedure: LEFT LEG ARTERIOGRAM WITH INTERVENTION (Left: Groin) Diagnosis:       Diabetic infection of left foot (HCC)      (Diabetic infection of left foot (HCC) [E11.628, L08.9])    Providers: Trevor Ellison MD Responsible Provider: Nilay Flores MD    Anesthesia Type: General ASA Status: 3            Anesthesia Type: General    Galo Phase I: Galo Score: 7    Galo Phase II:      Anesthesia Post Evaluation    Patient location during evaluation: PACU  Patient participation: complete - patient participated  Level of consciousness: awake and alert  Pain score: 2  Airway patency: patent  Nausea & Vomiting: no nausea  Cardiovascular status: blood pressure returned to baseline and hemodynamically stable  Respiratory status: acceptable  Hydration status: euvolemic  Multimodal analgesia pain management approach  Pain management: adequate and satisfactory to patient    No notable events documented.

## 2025-05-03 NOTE — OP NOTE
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     Surgeon: Trevor Ellison MD     Procedure:   Retrograde right common femoral access under ultrasound guidance with 6/7 Mynx closure  Aortogram  Pelvic angiogram  Selective catheter placement left superficial femoral artery with DSA to left foot  Left SFA Zilver 7mm x 60mm stent placement  Left P2 popliteal balloon angioplasty 6mm x 40mm     Complications: None     EBL: 20cc     Anesthesia: General     Radiographic findings:   Aortogram: Widely patent infrarenal aorta without ectasia or stenosis   Pelvic angiogram: Widely patent common iliac, internal iliac and external iliacs without significant stenosis or ectasia  Left lower extremity angiogram;     Implants:  Implant Name Type Inv. Item Serial No.  Lot No. LRB No. Used Action   STENT PERIPH L60MM DIA7MM DEL SYS L125CM SHTH 6FR 0.035IN - [id number] Peripheral stents STENT PERIPH L60MM DIA7MM DEL SYS L125CM SHTH 6FR 0.035IN  [employer] [id number] Left 1 Implanted     Description of procedure: After informed consent was obtained the patient was brought to the operating room and placed in supine position.  Preoperative antibiotics were given.  Appropriate anesthesia was administered.  Timeout was performed confirming patient identity and procedure.  Patient was then prepped and draped in sterile fashion. The right common femoral artery and femoral head was visualized under ultrasound and artery was accessed with a micropuncture needle, wire, and sheath. Sheath was upsized to a 6fr sheath over a J wire. Patient was systemically heparinized. Omniflush catheter and J wire were advanced to the juxtarenal aorta and aortogram was obtained. Catheter was  withdrawn to the pelvic bifurcation and pelvic angiogram was obtained. Catheter and wire were used to flex over the aortic bifurcation and placed at the level of the left common femoral artery. A left lower extremity DSA was obtained.

## 2025-05-06 ENCOUNTER — TELEPHONE (OUTPATIENT)
Dept: ORTHOPEDIC SURGERY | Age: 55
End: 2025-05-06

## 2025-05-06 NOTE — TELEPHONE ENCOUNTER
Constantino from [facility] is currently at the home of the patient and asks for a return call please.  He says it is in regards to the patient's foot wound with redness, swelling and pain.

## 2025-05-14 ENCOUNTER — TELEPHONE (OUTPATIENT)
Dept: ORTHOPEDIC SURGERY | Age: 55
End: 2025-05-14

## 2025-05-14 NOTE — TELEPHONE ENCOUNTER
Carmen w/?? (Provider of Wound Vac) left msg asking if you have received the LMN sent Friday and again yesterday, they need it signed and returned to know whether to continue with treatment or not ref# [id number] # [phone].

## 2025-05-15 ENCOUNTER — OFFICE VISIT (OUTPATIENT)
Dept: VASCULAR SURGERY | Age: 55
End: 2025-05-15
Payer: COMMERCIAL

## 2025-05-15 VITALS
HEART RATE: 68 BPM | HEIGHT: 78 IN | OXYGEN SATURATION: 93 % | SYSTOLIC BLOOD PRESSURE: 128 MMHG | WEIGHT: 315 LBS | DIASTOLIC BLOOD PRESSURE: 77 MMHG | BODY MASS INDEX: 36.45 KG/M2

## 2025-05-15 DIAGNOSIS — E11.621 DIABETIC ULCER OF LEFT MIDFOOT ASSOCIATED WITH TYPE 2 DIABETES MELLITUS, WITH BONE INVOLVEMENT WITHOUT EVIDENCE OF NECROSIS (HCC): Chronic | ICD-10-CM

## 2025-05-15 DIAGNOSIS — L97.426 DIABETIC ULCER OF LEFT MIDFOOT ASSOCIATED WITH TYPE 2 DIABETES MELLITUS, WITH BONE INVOLVEMENT WITHOUT EVIDENCE OF NECROSIS (HCC): Chronic | ICD-10-CM

## 2025-05-15 DIAGNOSIS — I73.9 PAD (PERIPHERAL ARTERY DISEASE): Primary | ICD-10-CM

## 2025-05-15 PROCEDURE — 3078F DIAST BP <80 MM HG: CPT | Performed by: STUDENT IN AN ORGANIZED HEALTH CARE EDUCATION/TRAINING PROGRAM

## 2025-05-15 PROCEDURE — 99213 OFFICE O/P EST LOW 20 MIN: CPT | Performed by: STUDENT IN AN ORGANIZED HEALTH CARE EDUCATION/TRAINING PROGRAM

## 2025-05-15 PROCEDURE — 3074F SYST BP LT 130 MM HG: CPT | Performed by: STUDENT IN AN ORGANIZED HEALTH CARE EDUCATION/TRAINING PROGRAM

## 2025-05-15 PROCEDURE — 3052F HG A1C>EQUAL 8.0%<EQUAL 9.0%: CPT | Performed by: STUDENT IN AN ORGANIZED HEALTH CARE EDUCATION/TRAINING PROGRAM

## 2025-05-20 ENCOUNTER — TELEPHONE (OUTPATIENT)
Dept: ORTHOPEDIC SURGERY | Age: 55
End: 2025-05-20

## 2025-05-20 NOTE — PROGRESS NOTES
Juan Luis Bravo MD at [facility]    NOSE SURGERY      TOTAL KNEE ARTHROPLASTY Bilateral     VASCULAR SURGERY Left 5/2/2025    LEFT LEG ARTERIOGRAM WITH INTERVENTION performed by Trevor Ellison MD at [facility] MAIN OR       Metal implants or AICD: no    Dye allergy: no     Smoker:  Tobacco Use      Smoking status: Every Day        Packs/day: 1.50        Types: Cigarettes      Smokeless tobacco: Never      Referred by: No ref. provider found    PCP:Aly Doshi, GRAY - NP     Trevor Ellison MD    Elements of this note have been dictated using speech recognition software. As a result, errors of speech recognition may have occurred.

## 2025-05-20 NOTE — TELEPHONE ENCOUNTER
They need to confirm if the wound vac is still being used since Jan. If so, they need the form completed and sent back. The form was faxed May 9. He is going to refax it today.

## 2025-06-04 ENCOUNTER — TELEPHONE (OUTPATIENT)
Dept: VASCULAR SURGERY | Age: 55
End: 2025-06-04

## 2025-06-04 NOTE — TELEPHONE ENCOUNTER
Called pt to notify he does not need to come in for his O.V. ayaz w/Dr. Ellison tomorrow bc he had the US on his 2 wk post op, so he's good for 3 months, which is already sched. In Aug. He expressed understanding and gratitude.

## (undated) DEVICE — GLOVE ORANGE PI 7 1/2   MSG9075

## (undated) DEVICE — SUTURE VICRYL + 3-0 L27IN ABSRB UD PS-2 L19MM 3/8 CIR PRIM VCP427H

## (undated) DEVICE — GOWN,SIRUS,NONRNF,SETINSLV,XL,20/CS: Brand: MEDLINE

## (undated) DEVICE — BNDG,ELSTC,MATRIX,STRL,4"X5YD,LF,HOOK&LP: Brand: MEDLINE

## (undated) DEVICE — SYRINGE MED 10ML LUERLOCK TIP W/O SFTY DISP

## (undated) DEVICE — PRECISION THIN (7.0 X 0.38 X 18.5MM)

## (undated) DEVICE — SUTURE VICRYL SZ 3-0 L27IN ABSRB UD L26MM CT-2 1/2 CIR J232H

## (undated) DEVICE — SUTURE PROL SZ 0 L30IN NONABSORBABLE BLU L26MM CT-2 1/2 CIR 8412H

## (undated) DEVICE — STRIP,CLOSURE,WOUND,MEDI-STRIP,1/2X4: Brand: MEDLINE

## (undated) DEVICE — PREMIUM WET SKIN PREP TRAY: Brand: MEDLINE INDUSTRIES, INC.

## (undated) DEVICE — FOOT ANKLE PACK: Brand: MEDLINE INDUSTRIES, INC.

## (undated) DEVICE — STERILE PVP: Brand: MEDLINE INDUSTRIES, INC.

## (undated) DEVICE — 7 DAY SILVER-COATED ANTIMICROBIAL BARRIER DRESSING: Brand: ACTICOAT 7  4" X 5"

## (undated) DEVICE — BANDAGE,ELASTIC,ESMARK,STERILE,4"X9',LF: Brand: MEDLINE

## (undated) DEVICE — GLOVE SURG SZ 85 L12IN FNGR ORTHO 126MIL CRM LTX FREE

## (undated) DEVICE — BASIC SINGLE BASIN-LF: Brand: MEDLINE INDUSTRIES, INC.

## (undated) DEVICE — FOOT & ANKLE SOFT DR WOMACK: Brand: MEDLINE INDUSTRIES, INC.

## (undated) DEVICE — BANDAGE,GAUZE,BULKEE II,4.5"X4.1YD,STRL: Brand: MEDLINE

## (undated) DEVICE — PAD,ABDOMINAL,5"X9",ST,LF,25/BX: Brand: MEDLINE INDUSTRIES, INC.

## (undated) DEVICE — HANDPIECE SET WITH COAXIAL HIGH FLOW TIP AND SUCTION TUBE: Brand: INTERPULSE

## (undated) DEVICE — NEEDLE HYPO 25GA L1.5IN BLU POLYPR HUB S STL REG BVL STR

## (undated) DEVICE — SOLUTION IRRIG 3000ML 0.9% SOD CHL USP UROMATIC PLAS CONT

## (undated) DEVICE — DRAIN SURG PENROSE 0.25X12 IN CLOSED WND DRAINAGE PREM SIL

## (undated) DEVICE — DRESSING PETRO W3XL8IN OIL EMUL N ADH GZ KNIT IMPREG CELOS

## (undated) DEVICE — GLOVE SURG SZ 8 L12IN FNGR THK79MIL GRN LTX FREE

## (undated) DEVICE — BANDAGE COMPR L5YDXW2IN FOAM CO FLX

## (undated) DEVICE — 4.0MM EGG BUR

## (undated) DEVICE — SUTURE MONOCRYL SZ 3-0 L27IN ABSRB UD L19MM PS-2 3/8 CIR PRIM Y427H

## (undated) DEVICE — 1010 S-DRAPE TOWEL DRAPE 10/BX: Brand: STERI-DRAPE™

## (undated) DEVICE — ZIMMER® STERILE DISPOSABLE TOURNIQUET CUFF, DUAL PORT, SINGLE BLADDER, 12 IN. (30 CM)

## (undated) DEVICE — DRAPE XR CASS L UNIV FIT ADH CLSR

## (undated) DEVICE — LOWER EXTREMITY: Brand: MEDLINE INDUSTRIES, INC.

## (undated) DEVICE — ZIMMER® STERILE DISPOSABLE TOURNIQUET CUFF WITH PLC, DUAL PORT, SINGLE BLADDER, 18 IN. (46 CM)

## (undated) DEVICE — SPONGE LAP W18XL18IN WHT COT 4 PLY FLD STRUNG RADPQ DISP ST 2 PER PACK

## (undated) DEVICE — SOLUTION IRRIG 1000ML 0.9% SOD CHL USP POUR PLAS BTL

## (undated) DEVICE — DRAPE C ARM W54XL84IN MINI FOR OEC 6800

## (undated) DEVICE — SUTURE ETHILON SZ 2-0 L18IN NONABSORBABLE BLK L19MM PS-2 PRIM 593H

## (undated) DEVICE — BANDAGE,GAUZE,CONFORMING,3"X75",STRL,LF: Brand: MEDLINE

## (undated) DEVICE — SUTURE PROL SZ 2-0 L36IN NONABSORBABLE BLU SH L26MM 1/2 CIR 8523H

## (undated) DEVICE — FOAM BUMP ROUND LARGE: Brand: MEDLINE INDUSTRIES, INC.

## (undated) DEVICE — SUTURE MONOCRYL STRATAFIX SPRL + SZ 4-0 L12IN ABSRB UD PS-2 SXMP1B117

## (undated) DEVICE — SOLUTION SCRB 4% CHG RED ANTIMIC SKIN CLN PREOPERATIVE DISP

## (undated) DEVICE — SPONGE GZ W4XL4IN COT 12 PLY TYP VII WVN C FLD DSGN STERILE

## (undated) DEVICE — GLOVE ORTHO 8   MSG9480

## (undated) DEVICE — PODIATRY: Brand: MEDLINE INDUSTRIES, INC.